# Patient Record
Sex: MALE | Race: WHITE | NOT HISPANIC OR LATINO | Employment: OTHER | RURAL
[De-identification: names, ages, dates, MRNs, and addresses within clinical notes are randomized per-mention and may not be internally consistent; named-entity substitution may affect disease eponyms.]

---

## 2017-03-23 ENCOUNTER — HISTORICAL (OUTPATIENT)
Dept: ADMINISTRATIVE | Facility: HOSPITAL | Age: 72
End: 2017-03-23

## 2017-03-24 LAB
LAB AP CLINICAL INFORMATION: NORMAL
LAB AP DIAGNOSIS - HISTORICAL: NORMAL
LAB AP GROSS PATHOLOGY - HISTORICAL: NORMAL
LAB AP SPECIMEN SUBMITTED - HISTORICAL: NORMAL

## 2017-07-26 ENCOUNTER — HISTORICAL (OUTPATIENT)
Dept: ADMINISTRATIVE | Facility: HOSPITAL | Age: 72
End: 2017-07-26

## 2017-07-27 LAB
LAB AP CLINICAL INFORMATION: NORMAL
LAB AP COMMENTS: NORMAL
LAB AP DIAGNOSIS - HISTORICAL: NORMAL
LAB AP GROSS PATHOLOGY - HISTORICAL: NORMAL
LAB AP SPECIMEN SUBMITTED - HISTORICAL: NORMAL

## 2019-01-23 ENCOUNTER — HISTORICAL (OUTPATIENT)
Dept: ADMINISTRATIVE | Facility: HOSPITAL | Age: 74
End: 2019-01-23

## 2019-10-16 ENCOUNTER — HISTORICAL (OUTPATIENT)
Dept: ADMINISTRATIVE | Facility: HOSPITAL | Age: 74
End: 2019-10-16

## 2019-11-11 ENCOUNTER — HISTORICAL (OUTPATIENT)
Dept: ADMINISTRATIVE | Facility: HOSPITAL | Age: 74
End: 2019-11-11

## 2020-08-18 ENCOUNTER — HISTORICAL (OUTPATIENT)
Dept: ADMINISTRATIVE | Facility: HOSPITAL | Age: 75
End: 2020-08-18

## 2020-08-20 LAB
INSULIN SERPL-ACNC: NORMAL U[IU]/ML
LAB AP CLINICAL INFORMATION: NORMAL
LAB AP DIAGNOSIS - HISTORICAL: NORMAL
LAB AP GROSS PATHOLOGY - HISTORICAL: NORMAL
LAB AP SPECIMEN SUBMITTED - HISTORICAL: NORMAL

## 2021-03-03 ENCOUNTER — HISTORICAL (OUTPATIENT)
Dept: ADMINISTRATIVE | Facility: HOSPITAL | Age: 76
End: 2021-03-03

## 2021-07-26 ENCOUNTER — HOSPITAL ENCOUNTER (OUTPATIENT)
Dept: RADIOLOGY | Facility: HOSPITAL | Age: 76
Discharge: HOME | End: 2021-07-26
Attending: ORTHOPAEDIC SURGERY
Payer: MEDICARE

## 2021-07-26 DIAGNOSIS — M79.642 HAND PAIN, LEFT: ICD-10-CM

## 2021-07-26 PROBLEM — M65.342 TRIGGER FINGER, LEFT RING FINGER: Status: ACTIVE | Noted: 2021-07-26

## 2021-07-26 PROCEDURE — 73130 X-RAY EXAM OF HAND: CPT | Mod: TC,LT

## 2021-08-18 PROBLEM — M65.342 TRIGGER FINGER, LEFT RING FINGER: Chronic | Status: ACTIVE | Noted: 2021-07-26

## 2021-08-19 ENCOUNTER — HOSPITAL ENCOUNTER (OUTPATIENT)
Facility: HOSPITAL | Age: 76
Discharge: HOME | End: 2021-08-19
Attending: ORTHOPAEDIC SURGERY | Admitting: ORTHOPAEDIC SURGERY
Payer: MEDICARE

## 2021-08-19 ENCOUNTER — ANESTHESIA (OUTPATIENT)
Dept: SURGERY | Facility: HOSPITAL | Age: 76
End: 2021-08-19
Payer: MEDICARE

## 2021-08-19 ENCOUNTER — ANESTHESIA EVENT (OUTPATIENT)
Dept: SURGERY | Facility: HOSPITAL | Age: 76
End: 2021-08-19
Payer: MEDICARE

## 2021-08-19 VITALS
HEART RATE: 62 BPM | DIASTOLIC BLOOD PRESSURE: 83 MMHG | SYSTOLIC BLOOD PRESSURE: 149 MMHG | TEMPERATURE: 97 F | OXYGEN SATURATION: 98 % | RESPIRATION RATE: 18 BRPM

## 2021-08-19 DIAGNOSIS — I10 HYPERTENSION: ICD-10-CM

## 2021-08-19 DIAGNOSIS — M65.342 TRIGGER FINGER, LEFT RING FINGER: Primary | ICD-10-CM

## 2021-08-19 LAB
ANION GAP SERPL CALCULATED.3IONS-SCNC: 9 MMOL/L (ref 7–16)
BUN SERPL-MCNC: 21 MG/DL (ref 7–18)
BUN/CREAT SERPL: 24 (ref 6–20)
CALCIUM SERPL-MCNC: 8.4 MG/DL (ref 8.5–10.1)
CHLORIDE SERPL-SCNC: 103 MMOL/L (ref 98–107)
CO2 SERPL-SCNC: 29 MMOL/L (ref 21–32)
CREAT SERPL-MCNC: 0.88 MG/DL (ref 0.7–1.3)
GLUCOSE SERPL-MCNC: 136 MG/DL (ref 74–106)
GLUCOSE SERPL-MCNC: 145 MG/DL (ref 70–105)
POTASSIUM SERPL-SCNC: 3.6 MMOL/L (ref 3.5–5.1)
SODIUM SERPL-SCNC: 137 MMOL/L (ref 136–145)

## 2021-08-19 PROCEDURE — 37000008 HC ANESTHESIA 1ST 15 MINUTES: Performed by: ORTHOPAEDIC SURGERY

## 2021-08-19 PROCEDURE — 36415 COLL VENOUS BLD VENIPUNCTURE: CPT | Performed by: ORTHOPAEDIC SURGERY

## 2021-08-19 PROCEDURE — 93005 ELECTROCARDIOGRAM TRACING: CPT | Mod: 59

## 2021-08-19 PROCEDURE — D9220A PRA ANESTHESIA: ICD-10-PCS | Mod: ANES,ICN,, | Performed by: ANESTHESIOLOGY

## 2021-08-19 PROCEDURE — 25000003 PHARM REV CODE 250: Performed by: NURSE ANESTHETIST, CERTIFIED REGISTERED

## 2021-08-19 PROCEDURE — 93010 ELECTROCARDIOGRAM REPORT: CPT | Mod: ,,, | Performed by: HOSPITALIST

## 2021-08-19 PROCEDURE — 71000015 HC POSTOP RECOV 1ST HR: Performed by: ORTHOPAEDIC SURGERY

## 2021-08-19 PROCEDURE — 71000033 HC RECOVERY, INTIAL HOUR: Performed by: ORTHOPAEDIC SURGERY

## 2021-08-19 PROCEDURE — D9220A PRA ANESTHESIA: ICD-10-PCS | Mod: CRNA,,, | Performed by: NURSE ANESTHETIST, CERTIFIED REGISTERED

## 2021-08-19 PROCEDURE — 36000707: Performed by: ORTHOPAEDIC SURGERY

## 2021-08-19 PROCEDURE — 27000284 HC CANNULA NASAL: Performed by: ANESTHESIOLOGY

## 2021-08-19 PROCEDURE — 80048 BASIC METABOLIC PNL TOTAL CA: CPT | Performed by: ORTHOPAEDIC SURGERY

## 2021-08-19 PROCEDURE — 37000009 HC ANESTHESIA EA ADD 15 MINS: Performed by: ORTHOPAEDIC SURGERY

## 2021-08-19 PROCEDURE — 27100168 OPTIME MED/SURG SUP & DEVICES NON-STERILE SUPPLY: Performed by: ORTHOPAEDIC SURGERY

## 2021-08-19 PROCEDURE — 82962 GLUCOSE BLOOD TEST: CPT

## 2021-08-19 PROCEDURE — 25000003 PHARM REV CODE 250: Performed by: ORTHOPAEDIC SURGERY

## 2021-08-19 PROCEDURE — 71000016 HC POSTOP RECOV ADDL HR: Performed by: ORTHOPAEDIC SURGERY

## 2021-08-19 PROCEDURE — D9220A PRA ANESTHESIA: Mod: CRNA,,, | Performed by: NURSE ANESTHETIST, CERTIFIED REGISTERED

## 2021-08-19 PROCEDURE — D9220A PRA ANESTHESIA: Mod: ANES,ICN,, | Performed by: ANESTHESIOLOGY

## 2021-08-19 PROCEDURE — 36000706: Performed by: ORTHOPAEDIC SURGERY

## 2021-08-19 PROCEDURE — 93010 EKG 12-LEAD: ICD-10-PCS | Mod: ,,, | Performed by: HOSPITALIST

## 2021-08-19 PROCEDURE — 63600175 PHARM REV CODE 636 W HCPCS: Performed by: NURSE ANESTHETIST, CERTIFIED REGISTERED

## 2021-08-19 PROCEDURE — 27201423 OPTIME MED/SURG SUP & DEVICES STERILE SUPPLY: Performed by: ORTHOPAEDIC SURGERY

## 2021-08-19 PROCEDURE — 27000716 HC OXISENSOR PROBE, ANY SIZE: Performed by: ANESTHESIOLOGY

## 2021-08-19 RX ORDER — MEPERIDINE HYDROCHLORIDE 25 MG/ML
25 INJECTION INTRAMUSCULAR; INTRAVENOUS; SUBCUTANEOUS EVERY 10 MIN PRN
Status: CANCELLED | OUTPATIENT
Start: 2021-08-19 | End: 2021-08-19

## 2021-08-19 RX ORDER — CEFAZOLIN SODIUM 1 G/3ML
INJECTION, POWDER, FOR SOLUTION INTRAMUSCULAR; INTRAVENOUS
Status: DISCONTINUED | OUTPATIENT
Start: 2021-08-19 | End: 2021-08-19

## 2021-08-19 RX ORDER — MORPHINE SULFATE 10 MG/ML
4 INJECTION INTRAMUSCULAR; INTRAVENOUS; SUBCUTANEOUS EVERY 5 MIN PRN
Status: CANCELLED | OUTPATIENT
Start: 2021-08-19

## 2021-08-19 RX ORDER — HYDROCODONE BITARTRATE AND ACETAMINOPHEN 5; 325 MG/1; MG/1
1 TABLET ORAL EVERY 4 HOURS PRN
Status: DISCONTINUED | OUTPATIENT
Start: 2021-08-19 | End: 2021-08-19 | Stop reason: HOSPADM

## 2021-08-19 RX ORDER — ONDANSETRON 2 MG/ML
4 INJECTION INTRAMUSCULAR; INTRAVENOUS DAILY PRN
Status: CANCELLED | OUTPATIENT
Start: 2021-08-19

## 2021-08-19 RX ORDER — ONDANSETRON 4 MG/1
8 TABLET, ORALLY DISINTEGRATING ORAL EVERY 8 HOURS PRN
Status: DISCONTINUED | OUTPATIENT
Start: 2021-08-19 | End: 2021-08-19 | Stop reason: HOSPADM

## 2021-08-19 RX ORDER — HYDROCODONE BITARTRATE AND ACETAMINOPHEN 10; 325 MG/1; MG/1
1 TABLET ORAL EVERY 4 HOURS PRN
Status: DISCONTINUED | OUTPATIENT
Start: 2021-08-19 | End: 2021-08-19 | Stop reason: HOSPADM

## 2021-08-19 RX ORDER — TRIAMTERENE AND HYDROCHLOROTHIAZIDE 37.5; 25 MG/1; MG/1
1 CAPSULE ORAL EVERY MORNING
COMMUNITY

## 2021-08-19 RX ORDER — LIDOCAINE HYDROCHLORIDE 5 MG/ML
INJECTION, SOLUTION INFILTRATION; INTRAVENOUS
Status: DISCONTINUED | OUTPATIENT
Start: 2021-08-19 | End: 2021-08-19

## 2021-08-19 RX ORDER — BUPIVACAINE HYDROCHLORIDE 2.5 MG/ML
INJECTION, SOLUTION EPIDURAL; INFILTRATION; INTRACAUDAL
Status: DISCONTINUED | OUTPATIENT
Start: 2021-08-19 | End: 2021-08-19 | Stop reason: HOSPADM

## 2021-08-19 RX ORDER — CEFAZOLIN SODIUM 2 G/50ML
2 SOLUTION INTRAVENOUS
Status: DISCONTINUED | OUTPATIENT
Start: 2021-08-19 | End: 2021-08-19 | Stop reason: HOSPADM

## 2021-08-19 RX ORDER — OXYCODONE HYDROCHLORIDE 5 MG/1
5 TABLET ORAL
Status: CANCELLED | OUTPATIENT
Start: 2021-08-19

## 2021-08-19 RX ORDER — ONDANSETRON 2 MG/ML
INJECTION INTRAMUSCULAR; INTRAVENOUS
Status: DISCONTINUED | OUTPATIENT
Start: 2021-08-19 | End: 2021-08-19

## 2021-08-19 RX ORDER — DIPHENHYDRAMINE HYDROCHLORIDE 50 MG/ML
25 INJECTION INTRAMUSCULAR; INTRAVENOUS EVERY 6 HOURS PRN
Status: CANCELLED | OUTPATIENT
Start: 2021-08-19

## 2021-08-19 RX ORDER — LIDOCAINE HYDROCHLORIDE 20 MG/ML
INJECTION, SOLUTION EPIDURAL; INFILTRATION; INTRACAUDAL; PERINEURAL
Status: DISCONTINUED | OUTPATIENT
Start: 2021-08-19 | End: 2021-08-19

## 2021-08-19 RX ORDER — PROMETHAZINE HYDROCHLORIDE 25 MG/1
25 TABLET ORAL EVERY 6 HOURS PRN
Status: DISCONTINUED | OUTPATIENT
Start: 2021-08-19 | End: 2021-08-19 | Stop reason: HOSPADM

## 2021-08-19 RX ORDER — TRAZODONE HYDROCHLORIDE 100 MG/1
100 TABLET ORAL NIGHTLY
COMMUNITY
End: 2022-04-18

## 2021-08-19 RX ORDER — HYDROMORPHONE HYDROCHLORIDE 2 MG/ML
0.5 INJECTION, SOLUTION INTRAMUSCULAR; INTRAVENOUS; SUBCUTANEOUS EVERY 5 MIN PRN
Status: CANCELLED | OUTPATIENT
Start: 2021-08-19

## 2021-08-19 RX ORDER — SODIUM CHLORIDE 9 MG/ML
INJECTION, SOLUTION INTRAVENOUS CONTINUOUS
Status: DISCONTINUED | OUTPATIENT
Start: 2021-08-19 | End: 2021-08-19 | Stop reason: HOSPADM

## 2021-08-19 RX ORDER — PROPOFOL 10 MG/ML
VIAL (ML) INTRAVENOUS
Status: DISCONTINUED | OUTPATIENT
Start: 2021-08-19 | End: 2021-08-19

## 2021-08-19 RX ORDER — ACETAMINOPHEN 500 MG
1000 TABLET ORAL EVERY 6 HOURS PRN
Status: DISCONTINUED | OUTPATIENT
Start: 2021-08-19 | End: 2021-08-19 | Stop reason: HOSPADM

## 2021-08-19 RX ORDER — HYDROCODONE BITARTRATE AND ACETAMINOPHEN 5; 325 MG/1; MG/1
1 TABLET ORAL EVERY 6 HOURS PRN
Qty: 28 TABLET | Refills: 0 | Status: SHIPPED | OUTPATIENT
Start: 2021-08-19 | End: 2021-08-26

## 2021-08-19 RX ORDER — FENTANYL CITRATE 50 UG/ML
INJECTION, SOLUTION INTRAMUSCULAR; INTRAVENOUS
Status: DISCONTINUED | OUTPATIENT
Start: 2021-08-19 | End: 2021-08-19

## 2021-08-19 RX ADMIN — PROPOFOL 25 MG: 10 INJECTION, EMULSION INTRAVENOUS at 03:08

## 2021-08-19 RX ADMIN — SODIUM CHLORIDE: 9 INJECTION, SOLUTION INTRAVENOUS at 03:08

## 2021-08-19 RX ADMIN — LIDOCAINE HYDROCHLORIDE 50 MG: 20 INJECTION, SOLUTION INTRAVENOUS at 03:08

## 2021-08-19 RX ADMIN — ONDANSETRON 4 MG: 2 INJECTION INTRAMUSCULAR; INTRAVENOUS at 03:08

## 2021-08-19 RX ADMIN — FENTANYL CITRATE 100 MCG: 50 INJECTION INTRAMUSCULAR; INTRAVENOUS at 03:08

## 2021-08-19 RX ADMIN — CEFAZOLIN 2 G: 1 INJECTION, POWDER, FOR SOLUTION INTRAMUSCULAR; INTRAVENOUS; PARENTERAL at 03:08

## 2021-08-19 RX ADMIN — LIDOCAINE HYDROCHLORIDE 250 MG: 5 INJECTION, SOLUTION INFILTRATION; INTRAVENOUS at 03:08

## 2021-09-01 PROBLEM — Z09 FOLLOW UP: Status: ACTIVE | Noted: 2021-09-01

## 2021-09-23 ENCOUNTER — HOSPITAL ENCOUNTER (OUTPATIENT)
Dept: RADIOLOGY | Facility: HOSPITAL | Age: 76
Discharge: HOME | End: 2021-09-23
Attending: NURSE PRACTITIONER
Payer: MEDICARE

## 2021-09-23 DIAGNOSIS — Z96.651 HISTORY OF TOTAL KNEE ARTHROPLASTY, RIGHT: ICD-10-CM

## 2021-09-23 PROCEDURE — 73562 XR KNEE 3 VIEW RIGHT: ICD-10-PCS | Mod: 26,RT,, | Performed by: RADIOLOGY

## 2021-09-23 PROCEDURE — 73562 X-RAY EXAM OF KNEE 3: CPT | Mod: 26,RT,, | Performed by: RADIOLOGY

## 2021-09-23 PROCEDURE — 73562 X-RAY EXAM OF KNEE 3: CPT | Mod: TC,RT

## 2021-10-14 PROCEDURE — 88305 TISSUE EXAM BY PATHOLOGIST: CPT | Mod: SUR | Performed by: DERMATOLOGY

## 2021-10-14 PROCEDURE — 88305 PATHOLOGY, DERMATOLOGY: ICD-10-PCS | Mod: 26,,, | Performed by: PATHOLOGY

## 2021-10-14 PROCEDURE — 88305 TISSUE EXAM BY PATHOLOGIST: CPT | Mod: 26,,, | Performed by: PATHOLOGY

## 2021-10-15 ENCOUNTER — LAB REQUISITION (OUTPATIENT)
Dept: LAB | Facility: HOSPITAL | Age: 76
End: 2021-10-15
Attending: DERMATOLOGY
Payer: MEDICARE

## 2021-10-15 DIAGNOSIS — D49.2 NEOPLASM OF UNSPECIFIED BEHAVIOR OF BONE, SOFT TISSUE, AND SKIN: ICD-10-CM

## 2021-10-18 LAB
ESTROGEN SERPL-MCNC: NORMAL PG/ML
LAB AP GROSS DESCRIPTION: NORMAL
LAB AP LABORATORY NOTES: NORMAL
LAB AP SPEC A DDX: NORMAL
LAB AP SPEC A MORPHOLOGY: NORMAL
LAB AP SPEC A PROCEDURE: NORMAL
T3RU NFR SERPL: NORMAL %

## 2021-10-28 DIAGNOSIS — M54.50 LOW BACK PAIN, UNSPECIFIED BACK PAIN LATERALITY, UNSPECIFIED CHRONICITY, UNSPECIFIED WHETHER SCIATICA PRESENT: Primary | ICD-10-CM

## 2021-11-01 ENCOUNTER — OFFICE VISIT (OUTPATIENT)
Dept: SPINE | Facility: CLINIC | Age: 76
End: 2021-11-01
Payer: MEDICARE

## 2021-11-01 ENCOUNTER — HOSPITAL ENCOUNTER (OUTPATIENT)
Dept: RADIOLOGY | Facility: HOSPITAL | Age: 76
Discharge: HOME | End: 2021-11-01
Attending: ORTHOPAEDIC SURGERY
Payer: MEDICARE

## 2021-11-01 VITALS — BODY MASS INDEX: 33.88 KG/M2 | HEIGHT: 71 IN | WEIGHT: 242 LBS

## 2021-11-01 DIAGNOSIS — M54.50 LOW BACK PAIN, UNSPECIFIED BACK PAIN LATERALITY, UNSPECIFIED CHRONICITY, UNSPECIFIED WHETHER SCIATICA PRESENT: ICD-10-CM

## 2021-11-01 DIAGNOSIS — G95.9 CERVICAL MYELOPATHY: ICD-10-CM

## 2021-11-01 DIAGNOSIS — M54.16 LUMBAR RADICULOPATHY: ICD-10-CM

## 2021-11-01 DIAGNOSIS — G95.9 CERVICAL MYELOPATHY: Primary | ICD-10-CM

## 2021-11-01 PROCEDURE — 72050 XR CERVICAL SPINE AP LAT WITH FLEX EXTEN: ICD-10-PCS | Mod: 26,,, | Performed by: ORTHOPAEDIC SURGERY

## 2021-11-01 PROCEDURE — 99204 PR OFFICE/OUTPT VISIT, NEW, LEVL IV, 45-59 MIN: ICD-10-PCS | Mod: S$PBB,,, | Performed by: ORTHOPAEDIC SURGERY

## 2021-11-01 PROCEDURE — 72110 X-RAY EXAM L-2 SPINE 4/>VWS: CPT | Mod: TC

## 2021-11-01 PROCEDURE — 72110 XR LUMBAR SPINE 5 VIEW WITH FLEX AND EXT: ICD-10-PCS | Mod: 26,,, | Performed by: ORTHOPAEDIC SURGERY

## 2021-11-01 PROCEDURE — 72050 X-RAY EXAM NECK SPINE 4/5VWS: CPT | Mod: TC

## 2021-11-01 PROCEDURE — 72050 X-RAY EXAM NECK SPINE 4/5VWS: CPT | Mod: 26,,, | Performed by: ORTHOPAEDIC SURGERY

## 2021-11-01 PROCEDURE — 72110 X-RAY EXAM L-2 SPINE 4/>VWS: CPT | Mod: 26,,, | Performed by: ORTHOPAEDIC SURGERY

## 2021-11-01 PROCEDURE — 99215 OFFICE O/P EST HI 40 MIN: CPT | Mod: PBBFAC | Performed by: ORTHOPAEDIC SURGERY

## 2021-11-01 PROCEDURE — 99204 OFFICE O/P NEW MOD 45 MIN: CPT | Mod: S$PBB,,, | Performed by: ORTHOPAEDIC SURGERY

## 2021-11-02 DIAGNOSIS — G95.9 CERVICAL MYELOPATHY: Primary | ICD-10-CM

## 2021-11-02 DIAGNOSIS — M54.16 LUMBAR RADICULOPATHY, CHRONIC: ICD-10-CM

## 2021-11-02 DIAGNOSIS — M48.02 SPINAL STENOSIS, CERVICAL REGION: ICD-10-CM

## 2021-11-09 ENCOUNTER — TELEPHONE (OUTPATIENT)
Dept: ADMINISTRATIVE | Facility: HOSPITAL | Age: 76
End: 2021-11-09
Payer: MEDICARE

## 2021-11-10 ENCOUNTER — TELEPHONE (OUTPATIENT)
Dept: SPINE | Facility: CLINIC | Age: 76
End: 2021-11-10
Payer: MEDICARE

## 2021-11-11 ENCOUNTER — TELEPHONE (OUTPATIENT)
Dept: SPINE | Facility: CLINIC | Age: 76
End: 2021-11-11
Payer: MEDICARE

## 2021-11-18 ENCOUNTER — HOSPITAL ENCOUNTER (OUTPATIENT)
Dept: RADIOLOGY | Facility: HOSPITAL | Age: 76
Discharge: HOME | End: 2021-11-18
Attending: ORTHOPAEDIC SURGERY
Payer: MEDICARE

## 2021-11-18 VITALS
WEIGHT: 248 LBS | BODY MASS INDEX: 34.72 KG/M2 | OXYGEN SATURATION: 98 % | HEART RATE: 60 BPM | DIASTOLIC BLOOD PRESSURE: 65 MMHG | HEIGHT: 71 IN | RESPIRATION RATE: 18 BRPM | SYSTOLIC BLOOD PRESSURE: 109 MMHG | TEMPERATURE: 98 F

## 2021-11-18 DIAGNOSIS — M48.02 SPINAL STENOSIS, CERVICAL REGION: ICD-10-CM

## 2021-11-18 DIAGNOSIS — M54.16 LUMBAR RADICULOPATHY, CHRONIC: ICD-10-CM

## 2021-11-18 DIAGNOSIS — G95.9 CERVICAL MYELOPATHY: ICD-10-CM

## 2021-11-18 LAB
APTT PPP: 31.4 SECONDS (ref 25.2–37.3)
BASOPHILS # BLD AUTO: 0.02 K/UL (ref 0–0.2)
BASOPHILS NFR BLD AUTO: 0.4 % (ref 0–1)
DIFFERENTIAL METHOD BLD: ABNORMAL
EOSINOPHIL # BLD AUTO: 0.08 K/UL (ref 0–0.5)
EOSINOPHIL NFR BLD AUTO: 1.6 % (ref 1–4)
ERYTHROCYTE [DISTWIDTH] IN BLOOD BY AUTOMATED COUNT: 12.5 % (ref 11.5–14.5)
GLUCOSE SERPL-MCNC: 152 MG/DL (ref 70–105)
HCT VFR BLD AUTO: 38.5 % (ref 40–54)
HGB BLD-MCNC: 13.5 G/DL (ref 13.5–18)
IMM GRANULOCYTES # BLD AUTO: 0.01 K/UL (ref 0–0.04)
IMM GRANULOCYTES NFR BLD: 0.2 % (ref 0–0.4)
INR BLD: 0.96 (ref 0.9–1.1)
LYMPHOCYTES # BLD AUTO: 1.36 K/UL (ref 1–4.8)
LYMPHOCYTES NFR BLD AUTO: 27.1 % (ref 27–41)
MCH RBC QN AUTO: 31.2 PG (ref 27–31)
MCHC RBC AUTO-ENTMCNC: 35.1 G/DL (ref 32–36)
MCV RBC AUTO: 88.9 FL (ref 80–96)
MONOCYTES # BLD AUTO: 0.44 K/UL (ref 0–0.8)
MONOCYTES NFR BLD AUTO: 8.8 % (ref 2–6)
MPC BLD CALC-MCNC: 9.5 FL (ref 9.4–12.4)
NEUTROPHILS # BLD AUTO: 3.1 K/UL (ref 1.8–7.7)
NEUTROPHILS NFR BLD AUTO: 61.9 % (ref 53–65)
NRBC # BLD AUTO: 0 X10E3/UL
NRBC, AUTO (.00): 0 %
PLATELET # BLD AUTO: 158 K/UL (ref 150–400)
PROTHROMBIN TIME: 12.8 SECONDS (ref 11.7–14.7)
RBC # BLD AUTO: 4.33 M/UL (ref 4.6–6.2)
WBC # BLD AUTO: 5.01 K/UL (ref 4.5–11)

## 2021-11-18 PROCEDURE — 72132 CT LUMBAR SPINE WITH CONTRAST: ICD-10-PCS | Mod: 26,,, | Performed by: STUDENT IN AN ORGANIZED HEALTH CARE EDUCATION/TRAINING PROGRAM

## 2021-11-18 PROCEDURE — 85610 PROTHROMBIN TIME: CPT | Performed by: STUDENT IN AN ORGANIZED HEALTH CARE EDUCATION/TRAINING PROGRAM

## 2021-11-18 PROCEDURE — 72129 CT CHEST SPINE W/DYE: CPT | Mod: 26,,, | Performed by: STUDENT IN AN ORGANIZED HEALTH CARE EDUCATION/TRAINING PROGRAM

## 2021-11-18 PROCEDURE — 72132 CT LUMBAR SPINE W/DYE: CPT | Mod: TC

## 2021-11-18 PROCEDURE — 62305 MYELOGRAPHY LUMBAR INJECTION: CPT

## 2021-11-18 PROCEDURE — 25500020 PHARM REV CODE 255: Performed by: ORTHOPAEDIC SURGERY

## 2021-11-18 PROCEDURE — 72126 CT CERVICAL SPINE WITH CONTRAST: ICD-10-PCS | Mod: 26,,, | Performed by: STUDENT IN AN ORGANIZED HEALTH CARE EDUCATION/TRAINING PROGRAM

## 2021-11-18 PROCEDURE — 72132 CT LUMBAR SPINE W/DYE: CPT | Mod: 26,,, | Performed by: STUDENT IN AN ORGANIZED HEALTH CARE EDUCATION/TRAINING PROGRAM

## 2021-11-18 PROCEDURE — 27201268 FL MYELOGRAM 2 OR MORE REGIONS

## 2021-11-18 PROCEDURE — 72126 CT NECK SPINE W/DYE: CPT | Mod: 26,,, | Performed by: STUDENT IN AN ORGANIZED HEALTH CARE EDUCATION/TRAINING PROGRAM

## 2021-11-18 PROCEDURE — 36415 COLL VENOUS BLD VENIPUNCTURE: CPT | Performed by: STUDENT IN AN ORGANIZED HEALTH CARE EDUCATION/TRAINING PROGRAM

## 2021-11-18 PROCEDURE — 82962 GLUCOSE BLOOD TEST: CPT

## 2021-11-18 PROCEDURE — 72129 CT THORACIC SPINE WITH CONTRAST: ICD-10-PCS | Mod: 26,,, | Performed by: STUDENT IN AN ORGANIZED HEALTH CARE EDUCATION/TRAINING PROGRAM

## 2021-11-18 PROCEDURE — 85730 THROMBOPLASTIN TIME PARTIAL: CPT | Performed by: STUDENT IN AN ORGANIZED HEALTH CARE EDUCATION/TRAINING PROGRAM

## 2021-11-18 PROCEDURE — 25000003 PHARM REV CODE 250: Performed by: STUDENT IN AN ORGANIZED HEALTH CARE EDUCATION/TRAINING PROGRAM

## 2021-11-18 PROCEDURE — 72126 CT NECK SPINE W/DYE: CPT | Mod: TC

## 2021-11-18 PROCEDURE — 85025 COMPLETE CBC W/AUTO DIFF WBC: CPT | Performed by: STUDENT IN AN ORGANIZED HEALTH CARE EDUCATION/TRAINING PROGRAM

## 2021-11-18 PROCEDURE — 72129 CT CHEST SPINE W/DYE: CPT | Mod: TC

## 2021-11-18 RX ORDER — IOPAMIDOL 408 MG/ML
10 INJECTION, SOLUTION INTRATHECAL
Status: DISCONTINUED | OUTPATIENT
Start: 2021-11-18 | End: 2021-11-19 | Stop reason: HOSPADM

## 2021-11-18 RX ORDER — DIAZEPAM 5 MG/1
5 TABLET ORAL ONCE
Status: COMPLETED | OUTPATIENT
Start: 2021-11-18 | End: 2021-11-18

## 2021-11-18 RX ORDER — IOPAMIDOL 408 MG/ML
15 INJECTION, SOLUTION INTRATHECAL
Status: COMPLETED | OUTPATIENT
Start: 2021-11-18 | End: 2021-11-18

## 2021-11-18 RX ADMIN — DIAZEPAM 5 MG: 5 TABLET ORAL at 08:11

## 2021-11-18 RX ADMIN — IOPAMIDOL 15 ML: 408 INJECTION, SOLUTION INTRATHECAL at 10:11

## 2021-11-22 ENCOUNTER — OFFICE VISIT (OUTPATIENT)
Dept: SPINE | Facility: CLINIC | Age: 76
End: 2021-11-22
Payer: MEDICARE

## 2021-11-22 DIAGNOSIS — M54.16 LUMBAR RADICULOPATHY, CHRONIC: ICD-10-CM

## 2021-11-22 DIAGNOSIS — M48.02 SPINAL STENOSIS, CERVICAL REGION: Primary | ICD-10-CM

## 2021-11-22 DIAGNOSIS — G95.9 CERVICAL MYELOPATHY: ICD-10-CM

## 2021-11-22 PROCEDURE — 99214 PR OFFICE/OUTPT VISIT, EST, LEVL IV, 30-39 MIN: ICD-10-PCS | Mod: S$PBB,,, | Performed by: ORTHOPAEDIC SURGERY

## 2021-11-22 PROCEDURE — 99214 OFFICE O/P EST MOD 30 MIN: CPT | Mod: PBBFAC | Performed by: ORTHOPAEDIC SURGERY

## 2021-11-22 PROCEDURE — 99214 OFFICE O/P EST MOD 30 MIN: CPT | Mod: S$PBB,,, | Performed by: ORTHOPAEDIC SURGERY

## 2021-11-22 RX ORDER — GABAPENTIN 400 MG/1
CAPSULE ORAL
COMMUNITY
End: 2022-04-18

## 2021-11-22 RX ORDER — TRAZODONE HYDROCHLORIDE 100 MG/1
TABLET ORAL
COMMUNITY
End: 2022-04-18

## 2021-11-22 RX ORDER — GLIPIZIDE 10 MG/1
TABLET ORAL
COMMUNITY
End: 2022-04-18

## 2021-11-22 RX ORDER — ALLOPURINOL 300 MG/1
TABLET ORAL
COMMUNITY
End: 2022-04-18

## 2021-11-22 RX ORDER — NAPROXEN SODIUM 220 MG/1
TABLET, FILM COATED ORAL
COMMUNITY
End: 2022-04-18

## 2021-11-22 RX ORDER — CARBIDOPA AND LEVODOPA 25; 100 MG/1; MG/1
TABLET ORAL
COMMUNITY
End: 2022-06-08

## 2021-11-22 RX ORDER — PANTOPRAZOLE SODIUM 40 MG/1
TABLET, DELAYED RELEASE ORAL
COMMUNITY
End: 2022-04-18

## 2021-11-22 RX ORDER — CARVEDILOL 25 MG/1
TABLET ORAL
COMMUNITY
End: 2022-04-18

## 2021-11-22 RX ORDER — TAMSULOSIN HYDROCHLORIDE 0.4 MG/1
CAPSULE ORAL
COMMUNITY
End: 2022-04-18

## 2021-11-22 RX ORDER — BENZTROPINE MESYLATE 1 MG/1
TABLET ORAL
COMMUNITY
End: 2022-04-18

## 2021-11-22 RX ORDER — ATORVASTATIN CALCIUM 40 MG/1
TABLET, FILM COATED ORAL
COMMUNITY
End: 2022-04-18

## 2021-12-06 PROBLEM — Z09 FOLLOW UP: Status: RESOLVED | Noted: 2021-09-01 | Resolved: 2021-12-06

## 2022-01-19 ENCOUNTER — OFFICE VISIT (OUTPATIENT)
Dept: INTERNAL MEDICINE | Facility: CLINIC | Age: 77
End: 2022-01-19
Payer: MEDICARE

## 2022-01-19 ENCOUNTER — OFFICE VISIT (OUTPATIENT)
Dept: SPINE | Facility: CLINIC | Age: 77
End: 2022-01-19
Payer: MEDICARE

## 2022-01-19 VITALS
TEMPERATURE: 97 F | BODY MASS INDEX: 32.2 KG/M2 | HEART RATE: 89 BPM | DIASTOLIC BLOOD PRESSURE: 70 MMHG | OXYGEN SATURATION: 96 % | HEIGHT: 71 IN | SYSTOLIC BLOOD PRESSURE: 110 MMHG | WEIGHT: 230 LBS

## 2022-01-19 DIAGNOSIS — E11.9 CONTROLLED TYPE 2 DIABETES MELLITUS WITHOUT COMPLICATION, WITHOUT LONG-TERM CURRENT USE OF INSULIN: ICD-10-CM

## 2022-01-19 DIAGNOSIS — G95.9 CERVICAL MYELOPATHY: ICD-10-CM

## 2022-01-19 DIAGNOSIS — M48.02 SPINAL STENOSIS, CERVICAL REGION: ICD-10-CM

## 2022-01-19 DIAGNOSIS — I10 ESSENTIAL HYPERTENSION: Primary | ICD-10-CM

## 2022-01-19 DIAGNOSIS — M15.9 OSTEOARTHRITIS OF MULTIPLE JOINTS, UNSPECIFIED OSTEOARTHRITIS TYPE: ICD-10-CM

## 2022-01-19 DIAGNOSIS — E78.5 HYPERLIPIDEMIA LDL GOAL <100: ICD-10-CM

## 2022-01-19 DIAGNOSIS — M54.16 LUMBAR RADICULOPATHY, CHRONIC: Primary | ICD-10-CM

## 2022-01-19 PROCEDURE — 99214 OFFICE O/P EST MOD 30 MIN: CPT | Mod: S$PBB,,, | Performed by: ORTHOPAEDIC SURGERY

## 2022-01-19 PROCEDURE — 99214 OFFICE O/P EST MOD 30 MIN: CPT | Mod: PBBFAC,27 | Performed by: ORTHOPAEDIC SURGERY

## 2022-01-19 PROCEDURE — 99214 OFFICE O/P EST MOD 30 MIN: CPT | Mod: S$PBB,,, | Performed by: INTERNAL MEDICINE

## 2022-01-19 PROCEDURE — 99215 OFFICE O/P EST HI 40 MIN: CPT | Mod: PBBFAC | Performed by: INTERNAL MEDICINE

## 2022-01-19 PROCEDURE — 99214 PR OFFICE/OUTPT VISIT, EST, LEVL IV, 30-39 MIN: ICD-10-PCS | Mod: S$PBB,,, | Performed by: INTERNAL MEDICINE

## 2022-01-19 PROCEDURE — 99214 PR OFFICE/OUTPT VISIT, EST, LEVL IV, 30-39 MIN: ICD-10-PCS | Mod: S$PBB,,, | Performed by: ORTHOPAEDIC SURGERY

## 2022-01-19 NOTE — PROGRESS NOTES
Subjective:       Patient ID: Usama Lee is a 76 y.o. male.    Chief Complaint: Follow-up    Seeing dr martinez for neck and low back issues may have surg  Now for pt/ot  Does not check bs very often  Was 135 last time  Eats on diet    Follow-up  Pertinent negatives include no arthralgias, change in bowel habit, chest pain, fatigue or rash.     Review of Systems   Constitutional: Negative for appetite change, fatigue and unexpected weight change.   HENT: Negative for postnasal drip, trouble swallowing and goiter.    Eyes: Negative for visual disturbance.   Respiratory: Negative for apnea, choking and chest tightness.    Cardiovascular: Negative for chest pain and leg swelling.   Gastrointestinal: Negative for blood in stool, change in bowel habit and change in bowel habit.   Genitourinary: Negative for difficulty urinating and frequency.   Musculoskeletal: Negative for arthralgias, back pain and leg pain.   Integumentary:  Negative for rash.   Neurological: Negative for disturbances in coordination, memory loss and coordination difficulties.   Hematological: Negative for adenopathy.   Psychiatric/Behavioral: Negative for agitation. The patient is not hyperactive.          Objective:      Physical Exam  Vitals and nursing note reviewed.   Constitutional:       Appearance: Normal appearance. He is obese.   HENT:      Head: Normocephalic and atraumatic.      Right Ear: Tympanic membrane, ear canal and external ear normal.      Left Ear: Tympanic membrane, ear canal and external ear normal.      Nose: Nose normal.      Mouth/Throat:      Mouth: Mucous membranes are moist.      Pharynx: Oropharynx is clear.   Eyes:      Extraocular Movements: Extraocular movements intact.      Conjunctiva/sclera: Conjunctivae normal.      Pupils: Pupils are equal, round, and reactive to light.   Cardiovascular:      Rate and Rhythm: Normal rate and regular rhythm.      Pulses: Normal pulses.      Heart sounds: Normal heart sounds.    Pulmonary:      Effort: Pulmonary effort is normal.      Breath sounds: Normal breath sounds.   Abdominal:      General: Abdomen is flat. Bowel sounds are normal.      Palpations: Abdomen is soft.   Musculoskeletal:         General: Normal range of motion.      Cervical back: Normal range of motion.   Skin:     General: Skin is warm and dry.      Capillary Refill: Capillary refill takes less than 2 seconds.   Neurological:      General: No focal deficit present.      Mental Status: He is alert and oriented to person, place, and time.   Psychiatric:         Mood and Affect: Mood normal.         Behavior: Behavior normal.         Thought Content: Thought content normal.         Judgment: Judgment normal.         Assessment:       No diagnosis found.    Plan:         Patient Instructions   Continue current meds and f/u 3 months        Problem List Items Addressed This Visit    None

## 2022-01-19 NOTE — PROGRESS NOTES
MDM/time:  Greater than 30 minutes spent on this encounter including 10 minutes reviewing imaging and notes, 15 minutes with the patient, 5 minutes documentation    ASSESSMENT:  76 y.o. male with cervical spondylolisthesis with myelopathy and lumbar spondylosis with radiculopathy and moderate bilateral hip arthritis    PLAN:  Patient wants to try physical therapy.  Follow-up in 2 months.  Consider surgery for cervical and lumbar stenosis at that time if not improving    HPI:  76 y.o. male here for repeat evaluation of lower back pain that radiates down bilateral legs.  Denies any new injuries.  Reports bilateral lower extremities are worse.  It is also complaining of worsening balance and hand dysfunction.  He had 2 injections with Dr. Swain at [facility] without relief.  Patient reports history of peripheral neuropathy and relates his balance issues to this.  Patient reports decreased  strength in bilateral hands.  Difficulty with balance has had multiple falls walks with a cane.  Denies bladder incontinence difficulty walking more than 100 yd due to shortness of breath weakness in his legs.  Currently taking Neurontin 400 mg 2 tablets twice a day and Aleve as needed for pain.  No recent physical therapy.  History of L4-5 fusion in . Patient does not smoke.  Patient has a pacemaker Dr. Moody is his cardiologist and is on Eliquis.    IMAGIN2021 cervical spine x-ray reviewed showed:  On the AP there is normal coronal alignment.  There is uncovertebral and facet hypertrophy seen.  On the lateral there is decreased cervical lordosis.  There are spondylotic changes noted with decrease in disc height and osteophyte formation seen.  There is retrolisthesis at C3-4.    CT myelogram cervical spine 2021 reviewed shows:  At C3-4 there is moderate central severe bilateral foraminal stenosis  At C4-5 there is moderate central severe bilateral foraminal stenosis  At C5-6 there is moderate  central stenosis  At C6-7 there is severe central and bilateral foraminal stenosis    11/1/2021 lumbar spine x-ray reviewed showed:  On the AP there is normal coronal alignment.  There are 5 non-rib-bearing lumbar vertebrae.  On the lateral there is decreased lumbar lordosis.  There is spondylotic disease with decreased disc height and osteophyte formation noted.  There is apparent fusion at L4-5.  No fractures or listhesis noted.  No instability on flexion-extension views.  Moderate bilateral hip arthritis.    CT myelogram 11/18/2021 lumbar spine reviewed shows:  At L1-2 there is moderate bilateral lateral recess stenosis and left greater than right foraminal stenosis  At L2-3 there is moderate central and severe bilateral lateral recess stenosis  At L3-4 there is severe central bilateral lateral recess stenosis and severe bilateral foraminal stenosis  At L4-5 there is prior fusion with severe central bilateral lateral recess stenosis and severe bilateral foraminal stenosis  At L5-S1 there is severe bilateral foraminal stenosis  Past Medical History:   Diagnosis Date    Diabetes     Gout     Heart disease     High cholesterol     HTN (hypertension)     Parkinson disease      Past Surgical History:   Procedure Laterality Date    FLEXOR TENDON REPAIR Left 8/19/2021    Procedure: REPAIR, TENDON, FLEXOR;  Surgeon: Daniel Dempsey MD;  Location: [facility];  Service: Orthopedics;  Laterality: Left;    TOTAL KNEE ARTHROPLASTY Right     TRIGGER FINGER RELEASE Left 8/19/2021    Procedure: RELEASE, TRIGGER FINGER,RING;  Surgeon: Daniel Dempsey MD;  Location: [facility] OR;  Service: Orthopedics;  Laterality: Left;     Social History     Tobacco Use    Smoking status: Never Smoker    Smokeless tobacco: Never Used   Substance Use Topics    Alcohol use: Not Currently      Current Outpatient Medications   Medication Instructions    allopurinoL (ZYLOPRIM) 300 MG tablet TAKE 1 TABLET(S) BY MOUTH  DAILY    allopurinoL (ZYLOPRIM) 300 MG tablet 1 tablet    apixaban (ELIQUIS) 2.5 mg Tab as directed    apixaban (ELIQUIS) 5 mg Tab 1 tablet, Oral, Every 12 hours    aspirin (ECOTRIN) 81 MG EC tablet 1 tablet, Oral, Daily    aspirin 81 MG Chew 1 tablet    atorvastatin (LIPITOR) 40 MG tablet 1 tablet    atorvastatin (LIPITOR) 80 MG tablet TAKE ONE-HALF TABLET BY MOUTH DAILY FOR CHOLESTEROL. STOP MEDICATION AND CALL PROVIDER FOR ANY UNEXPLAINED MUSCLE ACHES,PAIN OR WEAKNESS    benztropine (COGENTIN) 1 MG tablet 1 tablet, Oral, Nightly    benztropine (COGENTIN) 1 MG tablet 1 tablet at bedtime    carbidopa-levodopa  mg (SINEMET)  mg per tablet Oral    carbidopa-levodopa  mg (SINEMET)  mg per tablet 1 tablet    carvediloL (COREG) 25 MG tablet TAKE ONE TABLET BY MOUTH 2 TIMES A DAY FOR HEART    carvediloL (COREG) 25 MG tablet 1/2    doxycycline (MONODOX) 100 MG capsule TAKE ONE CAPSULE BY MOUTH DAILY WITH FOOD    ferrous sulfate (FEOSOL) 325 mg (65 mg iron) Tab tablet Oral    fluticasone propionate (FLONASE) 50 mcg/actuation nasal spray INSTILL 1 SPRAY IN NOSTRIL(S) DAILY    gabapentin (NEURONTIN) 400 MG capsule TAKE 1 CAPSULE BY MOUTH FOUR TIMES A DAY FOR NERVE PAIN    gabapentin (NEURONTIN) 400 MG capsule 1 capsule    glipiZIDE (GLUCOTROL) 10 MG tablet 1 tablet    glipiZIDE (GLUCOTROL) 5 MG tablet TAKE ONE TABLET BY MOUTH 2 TIMES A DAY FOR DIABETES    isosorbide mononitrate (IMDUR) 30 MG 24 hr tablet 1 tablet, Oral, Daily    nicotine polacrilex 2 MG Lozg DISSOLVE 1 LOZENGE IN MOUTH EVERY 2 HOURS AS NEEDED FOR SMOKING CESSATION    nitroGLYCERIN (NITROSTAT) 0.4 MG SL tablet DISSOLVE ONE TABLET UNDER THE TONGUE EVERY 5 MINUTES AS NEEDED    pantoprazole (PROTONIX) 40 MG tablet TAKE ONE TABLET BY MOUTH DAILY FOR STOMACH    pantoprazole (PROTONIX) 40 MG tablet 1 tablet    tadalafiL (CIALIS) 20 MG Tab TAKE 1/2 TABLET BY MOUTH ONE hour BEFORE sex    tamsulosin (FLOMAX) 0.4 mg  Cap TAKE 1 CAPSULE BY MOUTH EVERY EVENING FOR URINATION. TAKE 30 MINUTES AFTER EVENING MEAL.    tamsulosin (FLOMAX) 0.4 mg Cap 1 capsule 30 minutes after the same meal each day    traZODone (DESYREL) 100 MG tablet 1 tablet at bedtime    traZODone (DESYREL) 100 mg, Oral, Nightly    triamterene-hydrochlorothiazide 37.5-25 mg (DYAZIDE) 37.5-25 mg per capsule 1 capsule, Oral, Every morning    triamterene-hydrochlorothiazide 37.5-25 mg (MAXZIDE-25) 37.5-25 mg per tablet TAKE 1 TABLET BY MOUTH DAILY AS NEEDED FOR FLUID        EXAM:  Constitutional  General Appearance:  There is no height or weight on file to calculate BMI., NAD  Psychiatric   Orientation: Oriented to time, oriented to place, oriented to person  Mood and Affect: Active and alert, normal mood, normal affect  Gait and Station   Appearance:  Antalgic gait/wide-based walks with a cane, unable to tandem gait, unable to walk on toes, unable to walk on heels    CERVICAL  Musculoskeletal System  Shoulder:  normal appearance, no instability, no tenderness, normal ROM right, normal ROM left, no pain with ROM    Cervical Spine  Inspection:  alignment normal, no muscle atrophy  Soft Tissue Palpation on the Right:  no tenderness of the paracervicals, no tenderness of the trapezius, no tenderness of the rhomboid  Soft Tissue Palpation on the Left:  no tenderness of the paracervicals, no tenderness of the trapezius, no tenderness of the rhomboid  Bony Palpation:  no tenderness of the occipital protuberance  Active Range:     Flexion normal, Extension normal, Rotation to the left normal, Rotation to the right normal, Lateral flexion to the left normal, Lateral flexion to the right normal   No pain elicited on motion    Motor Strength  C5 on the right:  abduction deltoid 5/5  C5 on the left:  abduction deltoid 5/5  C6 on the Right:  flexion biceps 5/5, wrist extension 5/5  C6 on the Left:  flexion biceps 5/5, wrist extension 5/5  C7 on the Right:  extension fingers  5/5, extension triceps 5/5  C7 on the Left:  extension fingers 5/5, extension triceps 5/5  C8 on the Right:  flexion fingers 5/5  C8 on the Left:  flexion fingers 5/5  T1 on the Right:  abduction fingers 5/5  T1 on the Left:  abduction fingers 5/5    Neurological System  Sensation on the Right:  normal sensation of the extremities: right, normal median nerve distribution, normal ulnar nerve distribution  Sensation on the Left:  normal sensation of the extremities: left, normal median nerve distribution, normal ulnar nerve distribution  Biceps Reflex Right:  normal, Brachioradialis Reflex Right:  normal, Triceps Reflex Right: normal  Biceps Reflex Left:  normal, Brachioradialis Reflex Left: normal, Triceps Reflex Left:  normal  Special Test on the Right:  Spurlings test negative, Hoffmans reflex absent, no ankle clonus, Durkan test negative, Tinels sign negative at the elbow  Special Test on the Left:  Spurlings test negative, Hoffmans reflex absent, no ankle clonus, Durkan test negative, Tinels sign negative at the elbow    Skin:   Head and Neck:  normal   Right Upper Extremity:  normal   Left Upper Extremity:  normal    Cardiovascular:   Arterial Pulses Right:  radial right   Arterial Pulses Left:  radial left   Edema Right:  none   Edema Left:  None    LUMBAR  Musculoskeletal System   Hips: Normal appearance, no leg length discrepancy, normal motion; left, normal motion; right    Lumbar Spine                   Inspection:  Normal alignment, normal sagittal balance                  Range of motion:  Decreased flexion, extension, lateral bending, rotation. Pain with range of motion                  Bony Palpation of the Lumbar Spine:  No tenderness of the spinous process, no tenderness of the sacrum, no tenderness of the coccyx                  Bony Palpation of the Right Hip:  No tenderness of the iliac crest, no tenderness of the sciatic notch, no tenderness of the SI joint                  Bony Palpation of the  Left Hip:  No tenderness of the iliac crest, no tenderness of the sciatic notch, no tenderness of the SI joint                  Soft Tissue Palpation on the Right:  No tenderness of the paraspinal region, no tenderness of the iliolumbar region                  Soft Tissue Palpation on the Left:  No tenderness of the paraspinal region, no tenderness of the iliolumbar region    Motor Strength   L1 Right:  Hip flexion iliopsoas 5/5    L1 Left:  Hip flexion iliopsoas 5/5              L2-L4 Right:  Knee extension quadriceps 5/5, tibialis anterior 5/5              L2-L4 Left:  Knee extension quadriceps 5/5, tibialis anterior 5/5   L5 Right:  Extensor hallucis llongus 5/5,    L5 Left:  Extensor hallucis longus 5/5,    S1 Right:  Plantar flexion gastrocnemius 5/5   S1 Left:  Plantar flexion gastrocnemius 5/5    Neurological System   Ankle Reflex Right:  normal   Ankle Reflex Left: normal   Knee Reflex Right:  normal   Knee Reflex Left:  normal   Sensation on the Right:  L2 normal, L3 normal, L4 normal, L5 normal, S1 normal   Sensation on the Left:  L2 normal, L3 normal, L4 normal, L5 normal, S1 normal              Special Test on the Right:  Seated straight leg raising test negative, no clonus of the ankle              Special Test on the Left:  Seated straight leg raising test negative, no clonus of the ankle    Skin   Lumbosacral Spine:  Normal skin    Cardiovascular System   Arterial Pulses Right:  Posterior tibialis normal, dorsalis pedis normal   Arterial Pulses Left:  Posterior tibialis normal, dorsalis pedis normal   Edema Right: None   Edema Left:  None         admit wt used: 53Kg

## 2022-01-27 ENCOUNTER — CLINICAL SUPPORT (OUTPATIENT)
Dept: REHABILITATION | Facility: HOSPITAL | Age: 77
End: 2022-01-27
Payer: MEDICARE

## 2022-01-27 DIAGNOSIS — M54.16 LUMBAR RADICULOPATHY, CHRONIC: ICD-10-CM

## 2022-01-27 PROCEDURE — 97162 PT EVAL MOD COMPLEX 30 MIN: CPT

## 2022-01-27 NOTE — PLAN OF CARE
[facility]   Physical Therapy Initial Evaluation    Name: Usama Lee  Clinic Number: [id number]    Therapy Diagnosis:   Encounter Diagnosis   Name Primary?    Lumbar radiculopathy, chronic      Physician: Charbel Blake MD    Physician Orders: PT Eval and Treat   Medical Diagnosis from Referral: M54.16 Lumbar radiculopathy, chronic  Evaluation Date: 1/27/2022  Authorization Period Expiration: As medically necessary  Plan of Care Expiration: 3/11/2022  Visit # / Visits authorized: 1/ 12    Time In: 1:25 PM  Time Out: 2:05 PM  Total Appointment Time (timed & untimed codes): 40 minutes    Precautions: Standard    Subjective   Date of onset: Chronic  History of current condition - Magen reports: very mild pain in his lower back but reports more issues with balance and recurrent falls. He ambulates with use of rollator and reports a fall right before coming to his appointment today. He has significant spinal stenosis and posterolateral disc bulge at multiple segments of lumbar and cervical spine. He does not report taking any prescription pain medications and reports managing the pain from time to time with a moist hot pack. He also has significant peripheral neuropathy that affects his balance as well.      Medical History:   Past Medical History:   Diagnosis Date    Diabetes     Gout     Heart disease     High cholesterol     HTN (hypertension)     Parkinson disease        Surgical History:   Usama Lee  has a past surgical history that includes Total knee arthroplasty (Right); Trigger finger release (Left, 8/19/2021); and Flexor tendon repair (Left, 8/19/2021).    Medications:   Usama has a current medication list which includes the following prescription(s): allopurinol, allopurinol, apixaban, apixaban, aspirin, aspirin, atorvastatin, atorvastatin, benztropine, benztropine, carbidopa-levodopa  mg, carbidopa-levodopa  mg, carvedilol, carvedilol, doxycycline, ferrous  sulfate, fluticasone propionate, gabapentin, gabapentin, glipizide, glipizide, isosorbide mononitrate, nicotine polacrilex, nitroglycerin, pantoprazole, pantoprazole, tadalafil, tamsulosin, tamsulosin, trazodone, trazodone, triamterene-hydrochlorothiazide 37.5-25 mg, and triamterene-hydrochlorothiazide 37.5-25 mg.    Allergies:   Review of patient's allergies indicates:   Allergen Reactions    Lovastatin      Other reaction(s): Muscle pain        Imaging, CT scan films: FINDINGS:  Cervical spine:  Mild bilateral neural foramen narrowing due to uncovertebral and facet change at C2-C3.  Moderate to severe spinal canal narrowing and bilateral neural foraminal narrowing due to posterior disc complex, uncovertebral joint, and facet change at the C3-C4 level.   Severe spinal canal and severe bilateral neural foraminal narrowing due to posterior disc complex, uncovertebral joint, and facet change at C4-C5.  Moderate spinal canal and moderate bilateral neural foramen narrowing due to posterior disc complex, uncovertebral joint and facet change at C5-C6  Moderate spinal canal and severe bilateral neural foramen narrowing due to posterior disc complex, uncovertebral joint and facet change at C6-C7  Moderate spinal canal and mild bilateral neural foramen narrowing due to posterior disc complex, uncovertebral joint and facet change at C7-T1     Thoracic spine:  Mild left anterior spinal canal narrowing due to a disc protrusion at the T2-T3 level, series 3, image 37.   Posterior disc change, facet disease, and left facet osteophyte results in at least moderate spinal canal narrowing and moderate left-sided neural foramen narrowing.  This is at the T11-T12 level.  Otherwise the spinal canal and neural foramina are patent.  The pulmonary arteries are enlarged.  Scattered ground-glass attenuation.  Enlarged heart.    Lumbar spine level by level:   Posterior disc complex, ligamentum flavum thickening and facet change results in  moderate spinal canal narrowing and severe bilateral neural foramen narrowing at L1-L2  There is a central posterior disc protrusion resulting near total narrowing of the spinal canal without passage of contrast.  This is at the L2-L3 level, series 4, image 42.  Posterior disc change and facet arthropathy results in mild bony spinal canal and severe neural foramen narrowing at the L3-L4 level.  There is severe bilateral neural foramen narrowing at L4-5 and L5-S1 due to posterior disc change and facet disease at the L4-5 level.  There is also severe spinal canal narrowing due to these degenerative findings at the L4-5 level.  Severe calcified vascular disease    Prior Therapy: None in the past 6 months  Social History:  lives alone but has a  who comes by about 3 times week to assist as needed.  Occupation: Retired  Prior Level of Function: Independent    Pain:  Current 4/10, worst 8/10, best 2/10   Location: cervical spine/ lumbar spine and bilateral hips    Description: Aching  Aggravating Factors: Standing  Easing Factors: heating pad    Pts goals: improve balance    Objective     Posture:  1. Standing lordosis: Decreased  2. Sitting lordosis: Decreased  3. Iliac crest height:  equal  4. PSIS height:  equal  5. Pelvic rotation/torsion: no  6. Scoliosis: no  7. Lateral shift: No  8. Comments:    Forward bend:  Back bend:   Lateral trunk lean: right 35 left 35  Trunk rotation: right 48 left 48    MANUAL MUSCLE TEST  Right left   Hip flexion MMT number: 3+/5 MMT strength: 3+/5   Hip abduction MMT strength: 3/5 MMT strength: 3/5   Knee extension MMT strength: 3+/5 MMT strength: 3+/5   Knee flexion  MMT strength: 4/5 MMT strength: 3+/5   Ankle dorsiflexion MMT strength: 3+/5 MMT strength: 3+/5   Ankle plantar flexion  MMT strength: 3+/5 MMT strength: 3+/5   Extensor hallucis longus MMT strength: 3+/5 MMT strength: 3+/5     ROM/flexibility right left   Hip flexion (120) 105 105   Internal rotation (45) 18 18    External rotation (45) 40 40   Hamstring 90/90 (-10) -8 -8   Rectus femoris (120) 100 100   Other     Other       Special test Right  Left    SLR test < 60 degrees Positive Positive   SLR test > 60 degrees Positive Positive   Sitting slump test Positive Positive   Piriformis test Negative Negative   ANIVAL test Negative Negative   SI forward bend Negative Negative   SI distraction Negative Negative   SI compression Negative Negative     Gait assessment:   Step length: decreased right  Step width: decreased right  Sun: decreased  Antalgic gait: yes/ reduced LE clearance on BLE, increased anterior WS during ambulation, narrow NILTON, shortened step length  Assistive device: rolling walker  Tinetti: 13/28  DGI: 10/24  30 second sit<>stand: 5  Cervical Spine  Sensation:   RUE: Intact   LUE: Intact    Special Tests   Compression: Positive   Distraction: Positive   Spurling's: Positive     Right  AROM (degs)  Left   50 Cervical Flexion   50   35 Cervical Extension   35   20 Lateral bending  Normal 45 20   48 Rotation  Normal 80 48   155 Shoulder flexion 155   150  Shoulder abduction 150   WFL Internal rotation WFL   WFL External rotation WFL       Limitation/Restriction for FOTO Spine Survey    Therapist reviewed FOTO scores for Usama Lee on 1/27/2022.   FOTO documents entered into [employer] - see Media section.    Limitation Score: 32%         Assessment   Usama is a 76 y.o. male referred to outpatient Physical Therapy with a medical diagnosis of lumbar spine pain. Pt presents with pain in lower back, difficulty with ambulation and dynamic balance, difficulty with transfers and negotiation of steps, reduced ROM for both cervical and lumbar spine. Treatment will address patient's pain in lumbar and cervical spine but will also focus on LE strengthening and dynamic balance to increase stability with all forms of functional mobility    Pt prognosis is Excellent.   Pt will benefit from skilled outpatient Physical  Therapy to address the deficits stated above and in the chart below, provide pt/family education, and to maximize pt's level of independence.     Plan of care discussed with patient: Yes  Pt's spiritual, cultural and educational needs considered and patient is agreeable to the plan of care and goals as stated below:     Anticipated Barriers for therapy: None    Goals:  1. Patient will increase pain free ROM of cervical spine by 15% to reduce pain with checking blind spots while driving and looking up as if to reach for something overhead  2. Patient will increase Tinetti score to 23/28 to reduce overall fall risks.  3. Patient will increase DGI score to 18/24  4. Patient will demonstrate 9 stands in 30 seconds unassisted from standard height chair demonstrating improved LE strength and endurance    Plan   Plan of care Certification: 1/27/2022 to 3/11/2022.    Outpatient Physical Therapy 2 times weekly for 6 weeks to include the following interventions: Manual Therapy, Moist Heat/ Ice, Neuromuscular Re-ed, Patient Education, Therapeutic Activities and Therapeutic Exercise.     Alber Fowler, PT

## 2022-02-04 ENCOUNTER — CLINICAL SUPPORT (OUTPATIENT)
Dept: REHABILITATION | Facility: HOSPITAL | Age: 77
End: 2022-02-04
Payer: MEDICARE

## 2022-02-04 DIAGNOSIS — M54.16 LUMBAR RADICULOPATHY, CHRONIC: Primary | ICD-10-CM

## 2022-02-04 PROCEDURE — 97530 THERAPEUTIC ACTIVITIES: CPT | Mod: CQ

## 2022-02-04 PROCEDURE — 97010 HOT OR COLD PACKS THERAPY: CPT | Mod: CQ

## 2022-02-04 PROCEDURE — 97110 THERAPEUTIC EXERCISES: CPT | Mod: CQ

## 2022-02-04 NOTE — PROGRESS NOTES
"  Physical Therapy Treatment Note     Name: Usama Lee  Clinic Number: [id number]    Therapy Diagnosis: No diagnosis found.  Physician: Charbel Blake MD    Visit Date: 2/4/2022    Physician Orders: PT Eval and Treat  Medical Diagnosis from Referral: M54.16 Lumbar radiculopathy, chronic  Evaluation Date: 1/27/2022  Authorization Period Expiration: As medically necessary   Plan of Care Certification Period: 1/27/2022 to 3/11/2022  Updated Plan of Care Due: 3/11/2022  Visit # / Visits authorized: 2/12   PTA Visit #: 1    Time In: 9:32 AM  Time Out: 10:28 AM  Total Billable Time: 56 minutes    Precautions: Standard  Functional Level Prior to Evaluation: independent     Subjective     Pt reports: No complaints of pain upon arrival; Patient seeming frustrated stating, "I just don't have any balance or strength."  He will be provided with HEP this visit.   Response to previous treatment: N/A  Functional change: no change noted    Pain: 0/10  Location: N/A    Objective     Magen received therapeutic exercises to develop strength, endurance and flexibility for 46 minutes including:  Nu step x6 min level 2  Seated hamstring stretch 3x15 sec  Quad stretch-  SLR x20  Supine CLAMS BTB x30  Seated heel raises 20x3 sec  LAQ 7.5#  Seated march x20 7.5#  Hamstring curl GTB x30   Mini squats x20  Step ups 4" in // bars x10 ea  Hip extension in // bars x15 ea  Standing march x20 ea  SKTC 3x15 sec  Lumbar rotations-  HEP education    Magen participated in neuromuscular re-education activities to improve: Balance and Coordination for - minutes. The following activities were included:  Alternating toe taps 4" step-    Magen participated in dynamic functional therapeutic activities to improve functional performance for 10  minutes, including:  Stair negotiation-  Sit>stands from lowered level mat surface x10 (Min A-Mod A)  Transfer training with rollator:    Chair>mat x3   Mat>chair x3    Magen received hot pack for 15 minutes " to lumbar while performing supine exercises.     Home Exercises Provided and Patient Education Provided     Education provided: On proper execution of exercises requiring verbal and tactile cues; HEP education on handout provided    Written Home Exercises Provided: yes.  Exercises were reviewed and Magen was able to demonstrate them prior to the end of the session.  Magen demonstrated good  understanding of the education provided.     See EMR under Media for exercises provided 2/4/2022.    Assessment     Patient tolerated treatment well demonstrating quick onset of fatigue requiring rest breaks. PTA provided skilled cues to ensure proper execution, decrease muscle substitutions, and for overall safety. Reports he had no increased pain post therapy session but was fatigued.     Magen 's progression to be determined at later date.    Pt prognosis is Excellent.     Pt will continue to benefit from skilled outpatient physical therapy to address the deficits listed in the problem list box on initial evaluation, provide pt/family education and to maximize pt's level of independence in the home and community environment.     Pt's spiritual, cultural and educational needs considered and pt agreeable to plan of care and goals.     Anticipated barriers to physical therapy: none    Goals:  1. Patient will increase pain free ROM of cervical spine by 15% to reduce pain with checking blind spots while driving and looking up as if to reach for something overhead  2. Patient will increase Tinetti score to 23/28 to reduce overall fall risks.  3. Patient will increase DGI score to 18/24  4. Patient will demonstrate 9 stands in 30 seconds unassisted from standard height chair demonstrating improved LE strength and endurance    Plan     Continue with plan of care as indicated.     Outpatient Physical Therapy 2 times weekly for 6 weeks to include the following interventions: Manual Therapy, Moist Heat/ Ice, Neuromuscular Re-ed, Patient  Education, Therapeutic Activities and Therapeutic Exercise.     Brittney Glaser, PTA  2/4/2022

## 2022-02-08 ENCOUNTER — CLINICAL SUPPORT (OUTPATIENT)
Dept: REHABILITATION | Facility: HOSPITAL | Age: 77
End: 2022-02-08
Payer: MEDICARE

## 2022-02-08 DIAGNOSIS — M54.16 LUMBAR RADICULOPATHY, CHRONIC: Primary | ICD-10-CM

## 2022-02-08 PROCEDURE — 97530 THERAPEUTIC ACTIVITIES: CPT

## 2022-02-08 PROCEDURE — 97110 THERAPEUTIC EXERCISES: CPT

## 2022-02-08 NOTE — PROGRESS NOTES
"  Physical Therapy Treatment Note     Name: Usama Lee  Clinic Number: [id number]    Therapy Diagnosis: No diagnosis found.  Physician: Charbel Blake MD    Visit Date: 2/8/2022    Physician Orders: PT Eval and Treat  Medical Diagnosis from Referral: M54.16 Lumbar radiculopathy, chronic  Evaluation Date: 1/27/2022  Authorization Period Expiration: As medically necessary   Plan of Care Certification Period: 1/27/2022 to 3/11/2022  Updated Plan of Care Due: 3/11/2022  Visit # / Visits authorized: 3/12   PTA Visit #: 0    Time In: 9:24 AM  Time Out: 10:10 AM  Total Billable Time: 46 minutes    Precautions: Standard  Functional Level Prior to Evaluation: independent     Subjective     Pt reports: patient stated he was very sore and in a great deal of pain in his low back upon arrival.  He will be provided with HEP this visit.   Response to previous treatment: N/A  Functional change: no change noted    Pain: 6/10  Location: N/A    Objective     Magen received therapeutic exercises to develop strength, endurance and flexibility for 36 minutes including:  Nu step 6mins Lv 2  Seated hamstring stretch 3x15 sec  Quad stretch-  SLR x15  Supine clamshells BTB x30  Seated alternating ankle DF<>PF x20  Seated heel raises 10x3 sec  B LAQ 7.5# x10 ea  Seated march x15 7.5#  B Hamstring curl GTB x15 ea  Mini squats x15  Step ups 4" in // bars x10 ea  Hip extension in // bars x10 ea  Standing march x20 ea  SKTC 5x10 sec  Lumbar rotations 10x3 sec hold   HEP education    Magen participated in neuromuscular re-education activities to improve: Balance and Coordination for - minutes. The following activities were included:  Alternating toe taps 4" step-    Magen participated in dynamic functional therapeutic activities to improve functional performance and stability for 10  minutes, including:  Stair negotiation-  Sit>stands from lowered level mat surface x5 (Min A-Mod A)  Transfer training with rollator:    Chair>mat " x2   Mat>chair x2    Magen received hot pack for 15 minutes to lumbar while performing supine exercises.     Home Exercises Provided and Patient Education Provided     Education provided: On proper execution of exercises requiring verbal and tactile cues; HEP education on handout provided    Written Home Exercises Provided: yes.  Exercises were reviewed and Magen was able to demonstrate them prior to the end of the session.  Magen demonstrated good  understanding of the education provided.     See EMR under Media for exercises provided 2/4/2022.    Assessment     Patient tolerated treatment well and demonstrated quick fatigue and low endurance upon participating in standing activities. Patient required increased time and frequent rest breaks between exercises. PT provided education to ensure proper execution, decrease muscle substitutions, and for overall safety. Reports the pain radiating down his leg had gone away post therapy session but stated he was very fatigued.     Magen 's progression to be determined at later date.    Pt prognosis is Excellent.     Pt will continue to benefit from skilled outpatient physical therapy to address the deficits listed in the problem list box on initial evaluation, provide pt/family education and to maximize pt's level of independence in the home and community environment.     Pt's spiritual, cultural and educational needs considered and pt agreeable to plan of care and goals.     Anticipated barriers to physical therapy: none    Goals:  1. Patient will increase pain free ROM of cervical spine by 15% to reduce pain with checking blind spots while driving and looking up as if to reach for something overhead  2. Patient will increase Tinetti score to 23/28 to reduce overall fall risks.  3. Patient will increase DGI score to 18/24  4. Patient will demonstrate 9 stands in 30 seconds unassisted from standard height chair demonstrating improved LE strength and endurance    Plan      Continue with plan of care as indicated.     Outpatient Physical Therapy 2 times weekly for 6 weeks to include the following interventions: Manual Therapy, Moist Heat/ Ice, Neuromuscular Re-ed, Patient Education, Therapeutic Activities and Therapeutic Exercise.     Alber Fowler, PT  2/8/2022

## 2022-02-10 ENCOUNTER — CLINICAL SUPPORT (OUTPATIENT)
Dept: REHABILITATION | Facility: HOSPITAL | Age: 77
End: 2022-02-10
Payer: MEDICARE

## 2022-02-10 DIAGNOSIS — M54.16 LUMBAR RADICULOPATHY, CHRONIC: Primary | ICD-10-CM

## 2022-02-10 PROCEDURE — 97530 THERAPEUTIC ACTIVITIES: CPT

## 2022-02-10 PROCEDURE — 97110 THERAPEUTIC EXERCISES: CPT

## 2022-02-10 NOTE — PROGRESS NOTES
"  Physical Therapy Treatment Note     Name: Usama Lee  Clinic Number: [id number]    Therapy Diagnosis: No diagnosis found.  Physician: Charbel Blake MD    Visit Date: 2/10/2022    Physician Orders: PT Eval and Treat  Medical Diagnosis from Referral: M54.16 Lumbar radiculopathy, chronic  Evaluation Date: 1/27/2022  Authorization Period Expiration: As medically necessary   Plan of Care Certification Period: 1/27/2022 to 3/11/2022  Updated Plan of Care Due: 3/11/2022  Visit # / Visits authorized: 4/12   PTA Visit #: 0    Time In: 9:25 AM  Time Out: 10:15 AM  Total Billable Time: 50 minutes    Precautions: Standard  Functional Level Prior to Evaluation: independent     Subjective     Pt reports: patient stated he feels very tired upon arrival, spent the day at a sleep lab appointment yesterday. Intermittent shooting pain and poor balance are his chief complaints.  He will be provided with HEP this visit.   Response to previous treatment: N/A  Functional change: no change noted    Pain: 8/10 when he experiences it; mostly shooting radicular pain that comes and goes  Location: N/A    Objective     Magen received therapeutic exercises to develop strength, endurance and flexibility for 40 minutes including:  Nu step 6mins Lv 2  Seated hamstring stretch 3x15 sec  Quad stretch-  SLR x15  Supine clamshells BTB x30  Seated alternating ankle DF<>PF x20  Seated heel raises 10x3 sec  B LAQ 7.5# x10 ea  Seated march x15 7.5#  B Hamstring curl GTB x15 ea  Mini squats x15  Step ups 4" in // bars x10 ea  Hip extension in // bars x10 ea  Standing march x20   SKTC 5x10 sec  Lumbar rotations 10x3 sec hold   HEP education    Magen participated in neuromuscular re-education activities to improve: Balance and Coordination for - minutes. The following activities were included:  Alternating toe taps 4" step-    Magen participated in dynamic functional therapeutic activities to improve functional performance and stability for 10  " minutes, including:  Stair negotiation-  Sit>stands from lowered level mat surface x5 (Min A-Mod A)  Transfer training with rollator:    Chair>mat x2   Mat>chair x2    Magen received hot pack for 15 minutes to lumbar while performing supine exercises.     Home Exercises Provided and Patient Education Provided     Education provided: On proper execution of exercises requiring verbal and tactile cues; HEP education on handout provided    Written Home Exercises Provided: yes.  Exercises were reviewed and Magen was able to demonstrate them prior to the end of the session.  Magen demonstrated good  understanding of the education provided.     See EMR under Media for exercises provided 2/4/2022.    Assessment     Patient tolerated treatment well but demonstrated quick fatigue and low endurance upon participating in standing activities. Patient required increased time and frequent rest breaks between exercises. PT provided education to ensure proper execution, decrease muscle substitutions, and for overall safety. Reports the pain radiating down his leg had gone away post therapy session but stated he was very fatigued. Patient stated his main issue now is poor balance and endurance, says his back pain is intermittent and once he gets moving, it usually becomes dull and less frequent.    Magen 's progression to be determined at later date.    Pt prognosis is Excellent.     Pt will continue to benefit from skilled outpatient physical therapy to address the deficits listed in the problem list box on initial evaluation, provide pt/family education and to maximize pt's level of independence in the home and community environment.     Pt's spiritual, cultural and educational needs considered and pt agreeable to plan of care and goals.     Anticipated barriers to physical therapy: none    Goals:  1. Patient will increase pain free ROM of cervical spine by 15% to reduce pain with checking blind spots while driving and looking up  as if to reach for something overhead  2. Patient will increase Tinetti score to 23/28 to reduce overall fall risks.  3. Patient will increase DGI score to 18/24  4. Patient will demonstrate 9 stands in 30 seconds unassisted from standard height chair demonstrating improved LE strength and endurance    Plan     Continue with plan of care as indicated.     Outpatient Physical Therapy 2 times weekly for 6 weeks to include the following interventions: Manual Therapy, Moist Heat/ Ice, Neuromuscular Re-ed, Patient Education, Therapeutic Activities and Therapeutic Exercise.     Alber Fowler, PT  2/10/2022

## 2022-02-14 ENCOUNTER — CLINICAL SUPPORT (OUTPATIENT)
Dept: REHABILITATION | Facility: HOSPITAL | Age: 77
End: 2022-02-14
Payer: MEDICARE

## 2022-02-14 DIAGNOSIS — M54.16 LUMBAR RADICULOPATHY, CHRONIC: Primary | ICD-10-CM

## 2022-02-14 PROCEDURE — 97116 GAIT TRAINING THERAPY: CPT | Mod: CQ

## 2022-02-14 PROCEDURE — 97110 THERAPEUTIC EXERCISES: CPT | Mod: CQ

## 2022-02-14 PROCEDURE — 97010 HOT OR COLD PACKS THERAPY: CPT | Mod: CQ

## 2022-02-14 PROCEDURE — 97530 THERAPEUTIC ACTIVITIES: CPT | Mod: CQ

## 2022-02-14 NOTE — PROGRESS NOTES
"  Physical Therapy Treatment Note     Name: Usama Lee  Clinic Number: [id number]    Therapy Diagnosis: No diagnosis found.  Physician: Charbel Blake MD    Visit Date: 2/14/2022    Physician Orders: PT Eval and Treat  Medical Diagnosis from Referral: M54.16 Lumbar radiculopathy, chronic  Evaluation Date: 1/27/2022  Authorization Period Expiration: As medically necessary   Plan of Care Certification Period: 1/27/2022 to 3/11/2022  Updated Plan of Care Due: 3/11/2022  Visit # / Visits authorized: 5/12   PTA Visit #: 1    Time In: 9:30 AM  Time Out: 10:24 AM  Total Billable Time: 54 minutes    Precautions: Standard  Functional Level Prior to Evaluation: independent     Subjective     Pt reports: patient states he is not having pain in the low back at time of arrival. Intermittent shooting pain and poor balance are his chief complaints.  He reports compliance with HEP.  Response to previous treatment: minimal soreness reported  Functional change: no change noted    Pain: 0/10 at rest  Location: low back     Objective     Magen received therapeutic exercises to develop strength, endurance and flexibility for 36 minutes including:  Nu step 7 mins L3  Seated hamstring stretch 3x15 sec  Sciatic nerve glide B x20   Quad stretch 3x20 sec (manual)  SLR x20 2#  Supine clamshells GYTB x30  Seated alternating HR/TR x20   B LAQ x30 10# ea  Seated march x30 10#  B Hamstring curl BTB x20 ea  Seated resisted hip / GYTB x30 ea  Mini squats x15  Step ups 4" in // bars -  Standing march -   SKTC 5x10 sec  Lumbar rotations 10x3 sec hold     Magen participated in dynamic functional therapeutic activities to improve functional performance and stability for 8 minutes, including:  Stair negotiation-  Sit>stands from lowered level mat surface x10 (Min A required)  Transfer training with rollator: (Min A due to unsteadiness)   Chair>mat x2   Mat>chair x2    Magen received gait to improve overall safety and quality of functional " mobility with the use of RW for 10 minutes, including:  Patient performed 200 feet with the use of rollator requiring Contact Guard Assistance and Minimal Assistance  Patient demonstrated the following gait deviations: unsteady gait, path deviation, flexed posture, cues for AD placement to improve overall safety, one standing rest break. Episodes of LOB requiring Min A for recovery.      Magen received hot pack for 15 minutes to lumbar while performing nu step and supine exercises.     Home Exercises Provided and Patient Education Provided     Education provided: On proper execution of exercises requiring verbal and tactile cues    Written Home Exercises Provided: Patient instructed to cont prior HEP.  Exercises were reviewed and Magen was able to demonstrate them prior to the end of the session.  Magen demonstrated good  understanding of the education provided.     See EMR under Media for exercises provided prior visit.    Assessment     Patient tolerated treatment well but demonstrated quick fatigue.. Patient required increased time and frequent rest breaks between exercises. PTA provided education to ensure proper execution, decrease muscle substitutions, and for overall safety during functional mobility with AD. Reports the pain radiating down his leg had gone away post therapy session but stated he was very fatigued. Patient tolerated increased resistance and added exercises well. Patient would continue to benefit from therapy to improve LE strength, gross motor coordination, and balance to prevent fall risks..     Magen is progressing towards goals.     Pt prognosis is Excellent.     Pt will continue to benefit from skilled outpatient physical therapy to address the deficits listed in the problem list box on initial evaluation, provide pt/family education and to maximize pt's level of independence in the home and community environment.     Pt's spiritual, cultural and educational needs considered and pt  agreeable to plan of care and goals.     Anticipated barriers to physical therapy: none    Goals:  1. Patient will increase pain free ROM of cervical spine by 15% to reduce pain with checking blind spots while driving and looking up as if to reach for something overhead  2. Patient will increase Tinetti score to 23/28 to reduce overall fall risks.  3. Patient will increase DGI score to 18/24  4. Patient will demonstrate 9 stands in 30 seconds unassisted from standard height chair demonstrating improved LE strength and endurance    Plan     Continue with plan of care as indicated.     Outpatient Physical Therapy 2 times weekly for 6 weeks to include the following interventions: Manual Therapy, Moist Heat/ Ice, Neuromuscular Re-ed, Patient Education, Therapeutic Activities and Therapeutic Exercise.     Brittney Glaser, PTA  2/14/2022

## 2022-02-15 PROCEDURE — 88305 TISSUE EXAM BY PATHOLOGIST: CPT | Mod: SUR | Performed by: DERMATOLOGY

## 2022-02-15 PROCEDURE — 88305 TISSUE EXAM BY PATHOLOGIST: CPT | Mod: 26,,, | Performed by: PATHOLOGY

## 2022-02-15 PROCEDURE — 88305 PATHOLOGY, DERMATOLOGY: ICD-10-PCS | Mod: 26,,, | Performed by: PATHOLOGY

## 2022-02-16 ENCOUNTER — LAB REQUISITION (OUTPATIENT)
Dept: LAB | Facility: HOSPITAL | Age: 77
End: 2022-02-16
Attending: DERMATOLOGY
Payer: MEDICARE

## 2022-02-16 ENCOUNTER — CLINICAL SUPPORT (OUTPATIENT)
Dept: REHABILITATION | Facility: HOSPITAL | Age: 77
End: 2022-02-16
Payer: MEDICARE

## 2022-02-16 DIAGNOSIS — M54.16 LUMBAR RADICULOPATHY, CHRONIC: Primary | ICD-10-CM

## 2022-02-16 DIAGNOSIS — D49.2 NEOPLASM OF UNSPECIFIED BEHAVIOR OF BONE, SOFT TISSUE, AND SKIN: ICD-10-CM

## 2022-02-16 PROCEDURE — 97116 GAIT TRAINING THERAPY: CPT | Mod: CQ

## 2022-02-16 PROCEDURE — 97110 THERAPEUTIC EXERCISES: CPT | Mod: CQ

## 2022-02-16 PROCEDURE — 97010 HOT OR COLD PACKS THERAPY: CPT | Mod: CQ

## 2022-02-16 NOTE — PROGRESS NOTES
"  Physical Therapy Treatment Note     Name: Usama Lee  Clinic Number: [id number]    Therapy Diagnosis: No diagnosis found.  Physician: Charbel Blake MD    Visit Date: 2/16/2022    Physician Orders: PT Eval and Treat  Medical Diagnosis from Referral: M54.16 Lumbar radiculopathy, chronic  Evaluation Date: 1/27/2022  Authorization Period Expiration: As medically necessary   Plan of Care Certification Period: 1/27/2022 to 3/11/2022  Updated Plan of Care Due: 3/11/2022  Visit # / Visits authorized: 6/12   PTA Visit #: 2    Time In: 9:26 AM  Time Out: 10:13 AM  Total Billable Time: 47 minutes    Precautions: Standard  Functional Level Prior to Evaluation: independent     Subjective     Pt reports: patient states he is not having pain in the low back at time of arrival. Poor balance and weakness continues to be his chief complaints.  He reports compliance with HEP.  Response to previous treatment: moderate soreness reported  Functional change: reports decreased radiating symptoms down into posterior thighs    Pain: 0/10 at rest  Location: low back     Objective     Magen received therapeutic exercises to develop strength, endurance and flexibility for 37 minutes including:  Nu step 6 mins L3  Seated hamstring stretch 3x15 sec  Sciatic nerve glide B x20   Quad stretch 3x20 sec (manual)  SLR x20 2#  Supine clamshells GYTB x30  Seated alternating HR x30 10#  B LAQ x30 10# ea  B seated march x30 10# ea  B Hamstring curl BTB x20 ea  Seated resisted hip / GYTB x30 ea  Mini squats x15  Step ups 4" in // bars -  Standing march - SKTC 5x10 sec  Lumbar rotations 10x3 sec hold   Sit>stands from lowered level mat surface x10 (Min A x1 with cues for AD and hand placement to increase overall safety)    Magen received gait to improve overall safety and quality of functional mobility with the use of RW for 10 minutes, including:  Patient performed 215 feet with the use of rollator requiring Contact Guard Assistance and " Minimal Assistance  Patient demonstrated the following gait deviations: unsteady gait, path deviation, flexed posture, cues for AD placement and increase heel strik to improve overall safety, one standing rest break. Episodes of LOB requiring Min A for recovery. Patient able to correct gait deviations with cues better today.      Magen received hot pack for 15 minutes to lumbar while performing nu step and supine exercises.     Home Exercises Provided and Patient Education Provided     Education provided: On proper execution of exercises requiring verbal and tactile cues    Written Home Exercises Provided: Patient instructed to cont prior HEP.  Exercises were reviewed and Magen was able to demonstrate them prior to the end of the session.  Magen demonstrated good  understanding of the education provided.     See EMR under Media for exercises provided prior visit.    Assessment     Patient tolerated treatment well but demonstrated quick fatigue.. Patient required increased time and frequent rest breaks between exercises. PTA provided education to ensure proper execution, decrease muscle substitutions, and for overall safety during functional mobility with AD. Reports the pain radiating down his leg had gone away post therapy session but stated he was very fatigued. Patient tolerated increased resistance and added exercises well. Patient would continue to benefit from therapy to improve LE strength, gross motor coordination, and balance to prevent fall risks..     Magen is progressing towards goals.     Pt prognosis is Excellent.     Pt will continue to benefit from skilled outpatient physical therapy to address the deficits listed in the problem list box on initial evaluation, provide pt/family education and to maximize pt's level of independence in the home and community environment.     Pt's spiritual, cultural and educational needs considered and pt agreeable to plan of care and goals.     Anticipated barriers to  physical therapy: none    Goals:  1. Patient will increase pain free ROM of cervical spine by 15% to reduce pain with checking blind spots while driving and looking up as if to reach for something overhead  2. Patient will increase Tinetti score to 23/28 to reduce overall fall risks.  3. Patient will increase DGI score to 18/24  4. Patient will demonstrate 9 stands in 30 seconds unassisted from standard height chair demonstrating improved LE strength and endurance    Plan     Continue with plan of care as indicated.     Outpatient Physical Therapy 2 times weekly for 6 weeks to include the following interventions: Manual Therapy, Moist Heat/ Ice, Neuromuscular Re-ed, Patient Education, Therapeutic Activities and Therapeutic Exercise.     Brittney Glaser, PTA  2/16/2022

## 2022-02-17 LAB
ESTROGEN SERPL-MCNC: NORMAL PG/ML
LAB AP GROSS DESCRIPTION: NORMAL
LAB AP LABORATORY NOTES: NORMAL
LAB AP SPEC A DDX: NORMAL
LAB AP SPEC A MORPHOLOGY: NORMAL
LAB AP SPEC A PROCEDURE: NORMAL
LAB AP SPEC B DDX: NORMAL
LAB AP SPEC B MORPHOLOGY: NORMAL
LAB AP SPEC B PROCEDURE: NORMAL
T3RU NFR SERPL: NORMAL %

## 2022-02-25 NOTE — PROGRESS NOTES
Progress Report  Mr. Lee is progressing towards set goals. Treatment has focused on LE strength and dynamic balance for reduction of fall risks during independent ambulation within his home and in the community as he has a significant history of falling. He reports improvement in low back back pain but states that it does not hurt as much and that he is most concerned about his stability while performing activities around his home. Continued care is required to address documented deficits for safest function, reduction of low back pain, and return to PLOF.     Goals: All In Progress  1. Patient will increase pain free ROM of cervical spine by 15% to reduce pain with checking blind spots while driving and looking up as if to reach for something overhead   2. Patient will increase Tinetti score to 23/28 to reduce overall fall risks.  3. Patient will increase DGI score to 18/24  4. Patient will demonstrate 9 stands in 30 seconds unassisted from standard height chair demonstrating improved LE strength and endurance    Plan     Continue with POC as stated in initial POC: 1/27/2022 to 3/11/2022  Outpatient Physical Therapy 2 times weekly for 6 weeks to include the following interventions: Manual Therapy, Moist Heat/ Ice, Neuromuscular Re-ed, Patient Education, Therapeutic Activities and Therapeutic Exercise.    Alber Fowler, PT  2/25/2022      I CERTIFY THE NEED FOR THESE SERVICES FURNISHED UNDER THIS PLAN OF TREATMENT AND WHILE UNDER MY CARE.    Physician's comments:      Physician's Signature: ___________________________________________________

## 2022-02-28 ENCOUNTER — CLINICAL SUPPORT (OUTPATIENT)
Dept: REHABILITATION | Facility: HOSPITAL | Age: 77
End: 2022-02-28
Payer: MEDICARE

## 2022-02-28 DIAGNOSIS — M54.16 LUMBAR RADICULOPATHY, CHRONIC: Primary | ICD-10-CM

## 2022-02-28 PROCEDURE — 97110 THERAPEUTIC EXERCISES: CPT | Mod: CQ

## 2022-02-28 PROCEDURE — 97116 GAIT TRAINING THERAPY: CPT | Mod: CQ

## 2022-02-28 NOTE — PROGRESS NOTES
"  Physical Therapy Treatment Note     Name: Usama Lee  Clinic Number: [id number]    Therapy Diagnosis: No diagnosis found.  Physician: Charbel Blake MD    Visit Date: 2/28/2022    Physician Orders: PT Eval and Treat  Medical Diagnosis from Referral: M54.16 Lumbar radiculopathy, chronic  Evaluation Date: 1/27/2022  Authorization Period Expiration: As medically necessary   Plan of Care Certification Period: 1/27/2022 to 3/11/2022  Updated Plan of Care Due: 3/11/2022  Visit # / Visits authorized: 7/12   PTA Visit #: 3  Progress note needed by: 3/16/2022    Time In: 9:33 AM  Time Out: 10:26 AM  Total Billable Time: 53 minutes    Precautions: Standard  Functional Level Prior to Evaluation: independent     Subjective     Pt reports: patient states he is not having pain in the low back at time of arrival. Poor balance and weakness continues to be his chief complaints.  He reports compliance with HEP.  Response to previous treatment: moderate soreness reported  Functional change: reports decreased radiating symptoms down into posterior thighs    Pain: 0/10 at rest  Location: low back     Objective     Magen received therapeutic exercises to develop strength, endurance and flexibility for 43 minutes including:  Nu step x8 mins L3  Seated hamstring stretch 3x15 sec  Sciatic nerve glide B x20   Quad stretch 4x20 sec (manual)  SLR x20 2#  Seated HR x30 10#  B LAQ x30 10# ea  B seated march 10# ea-  B Hamstring curl GYTB x20 ea  Seated resisted hip / GYTB x30 ea  Step ups 4" in // bars -  Standing hip abduction x15 ea  Standing march x20 ea  Mini squats x15  SKTC 5x10 sec  Lumbar rotations -  Sit>stands from lowered level mat surface x10 (Min A x1 with cues for AD and hand placement to increase overall safety)    Magen received gait to improve overall safety and quality of functional mobility with the use of RW for 10 minutes, including:  Patient performed 215 feet with the use of rollator requiring Contact Guard " Assistance and Minimal Assistance  Patient demonstrated the following gait deviations: unsteady gait, path deviation, flexed posture, cues for AD placement and increase heel strik to improve overall safety, one standing rest break. Episodes of LOB requiring Min A for recovery. Patient able to correct gait deviations with cues better today.      Magen received hot pack for 15 minutes to lumbar while performing nu step and supine exercises.     Home Exercises Provided and Patient Education Provided     Education provided: On proper execution of exercises requiring verbal and tactile cues    Written Home Exercises Provided: Patient instructed to cont prior HEP.  Exercises were reviewed and Magen was able to demonstrate them prior to the end of the session.  Magen demonstrated good  understanding of the education provided.     See EMR under Media for exercises provided prior visit.    Assessment     Patient tolerated treatment well but demonstrated quick fatigue.. Patient required increased time and frequent rest breaks between exercises. PTA provided education to ensure proper execution, decrease muscle substitutions, and for overall safety during functional mobility with AD. Patient tolerated increased resistance and added exercises well. Patient would continue to benefit from therapy to improve LE strength, gross motor coordination, and balance to prevent fall risks.    Magen is progressing towards goals.     Pt prognosis is Excellent.     Pt will continue to benefit from skilled outpatient physical therapy to address the deficits listed in the problem list box on initial evaluation, provide pt/family education and to maximize pt's level of independence in the home and community environment.     Pt's spiritual, cultural and educational needs considered and pt agreeable to plan of care and goals.     Anticipated barriers to physical therapy: none    Goals:  1. Patient will increase pain free ROM of cervical spine by  15% to reduce pain with checking blind spots while driving and looking up as if to reach for something overhead  2. Patient will increase Tinetti score to 23/28 to reduce overall fall risks.  3. Patient will increase DGI score to 18/24  4. Patient will demonstrate 9 stands in 30 seconds unassisted from standard height chair demonstrating improved LE strength and endurance    Plan     Continue with plan of care as indicated.     Outpatient Physical Therapy 2 times weekly for 6 weeks to include the following interventions: Manual Therapy, Moist Heat/ Ice, Neuromuscular Re-ed, Patient Education, Therapeutic Activities and Therapeutic Exercise.     Brittney Glaser, PTA  2/28/2022

## 2022-03-02 ENCOUNTER — CLINICAL SUPPORT (OUTPATIENT)
Dept: REHABILITATION | Facility: HOSPITAL | Age: 77
End: 2022-03-02
Payer: MEDICARE

## 2022-03-02 DIAGNOSIS — M54.16 LUMBAR RADICULOPATHY, CHRONIC: Primary | ICD-10-CM

## 2022-03-02 PROCEDURE — 97110 THERAPEUTIC EXERCISES: CPT

## 2022-03-02 NOTE — PROGRESS NOTES
"  Physical Therapy Treatment Note     Name: Usama Lee  Clinic Number: [id number]    Therapy Diagnosis:   Encounter Diagnosis   Name Primary?    Lumbar radiculopathy, chronic Yes     Physician: Charbel Blake MD    Visit Date: 3/2/2022    Physician Orders: PT Eval and Treat  Medical Diagnosis from Referral: M54.16 Lumbar radiculopathy, chronic  Evaluation Date: 1/27/2022  Authorization Period Expiration: As medically necessary   Plan of Care Certification Period: 1/27/2022 to 3/11/2022  Updated Plan of Care Due: 3/11/2022  Visit # / Visits authorized: 8/12   PTA Visit #: 0  Progress note needed by: 3/16/2022    Time In: 9:22 AM  Time Out: 10:09 AM  Total Billable Time: 47 minutes    Precautions: Standard  Functional Level Prior to Evaluation: independent     Subjective     Pt reports: patient states he is not having pain in the low back at time of arrival. Poor balance and weakness continues to be his chief complaints.  He reports compliance with HEP.  Response to previous treatment: moderate soreness reported  Functional change: reports decreased radiating symptoms down into posterior thighs    Pain: 0/10 at rest  Location: low back     Objective     Magen received therapeutic exercises to develop strength, endurance and flexibility for 47 minutes including:  Nu step x8 mins L3  Bridges x30  Sciatic nerve glide B x20   Quad stretch 4x20 sec (manual)  SLR x20   Leg Press x30 60#  Seated HR x30 10#  B LAQ x30 10# ea  B seated march 10# ea-  B Hamstring curl (machine) x30 ea 30#  Seated resisted hip / GYTB x30 ea  Step ups 6" in // bars - x20 ea  Seated hip abduction x40 BTB  Standing march x20 ea  Mini squats x30  SKTC 5x10 sec  Lumbar rotations -  Sit>stands from lowered level mat surface x10 (Min A x1 with cues for AD and hand placement to increase overall safety)    Magen received gait to improve overall safety and quality of functional mobility with the use of RW for 10 minutes, including:  Patient " performed 215 feet with the use of rollator requiring Contact Guard Assistance and Minimal Assistance  Patient demonstrated the following gait deviations: unsteady gait, path deviation, flexed posture, cues for AD placement and increase heel strik to improve overall safety, one standing rest break. Episodes of LOB requiring Min A for recovery. Patient able to correct gait deviations with cues better today.      Magen received hot pack for  minutes to lumbar while performing nu step and supine exercises.     Home Exercises Provided and Patient Education Provided     Education provided: On proper execution of exercises requiring verbal and tactile cues    Written Home Exercises Provided: Patient instructed to cont prior HEP.  Exercises were reviewed and Magen was able to demonstrate them prior to the end of the session.  Magen demonstrated good  understanding of the education provided.     See EMR under Media for exercises provided prior visit.    Assessment     Patient tolerated treatment well but demonstrated quick fatigue.. Patient required increased time and frequent rest breaks between exercises. PT provided education to ensure proper execution, decrease muscle substitutions, and for overall safety during functional mobility with AD. Patient tolerated increased resistance and added exercises well. Patient would continue to benefit from therapy to improve LE strength, gross motor coordination, and balance to prevent fall risks.    Magen is progressing towards goals.     Pt prognosis is Excellent.     Pt will continue to benefit from skilled outpatient physical therapy to address the deficits listed in the problem list box on initial evaluation, provide pt/family education and to maximize pt's level of independence in the home and community environment.     Pt's spiritual, cultural and educational needs considered and pt agreeable to plan of care and goals.     Anticipated barriers to physical therapy:  none    Goals:  1. Patient will increase pain free ROM of cervical spine by 15% to reduce pain with checking blind spots while driving and looking up as if to reach for something overhead  2. Patient will increase Tinetti score to 23/28 to reduce overall fall risks.  3. Patient will increase DGI score to 18/24  4. Patient will demonstrate 9 stands in 30 seconds unassisted from standard height chair demonstrating improved LE strength and endurance    Plan     Continue with plan of care as indicated.     Outpatient Physical Therapy 2 times weekly for 6 weeks to include the following interventions: Manual Therapy, Moist Heat/ Ice, Neuromuscular Re-ed, Patient Education, Therapeutic Activities and Therapeutic Exercise.     Alber Fowler, PT  3/2/2022

## 2022-03-07 ENCOUNTER — CLINICAL SUPPORT (OUTPATIENT)
Dept: REHABILITATION | Facility: HOSPITAL | Age: 77
End: 2022-03-07
Payer: MEDICARE

## 2022-03-07 DIAGNOSIS — M54.16 LUMBAR RADICULOPATHY, CHRONIC: Primary | ICD-10-CM

## 2022-03-07 PROCEDURE — 97116 GAIT TRAINING THERAPY: CPT | Mod: CQ

## 2022-03-07 PROCEDURE — 97110 THERAPEUTIC EXERCISES: CPT | Mod: CQ

## 2022-03-07 NOTE — PROGRESS NOTES
"  Physical Therapy Treatment Note     Name: Usama Lee  Clinic Number: [id number]    Therapy Diagnosis:   No diagnosis found.  Physician: Charbel Blake MD    Visit Date: 3/7/2022    Physician Orders: PT Eval and Treat  Medical Diagnosis from Referral: M54.16 Lumbar radiculopathy, chronic  Evaluation Date: 1/27/2022  Authorization Period Expiration: As medically necessary   Plan of Care Certification Period: 1/27/2022 to 3/11/2022  Updated Plan of Care Due: 3/11/2022  Visit # / Visits authorized: 9/12   PTA Visit #: 1  Progress note needed by: 3/16/2022    Time In: 9:28 AM  Time Out: 10:12 AM  Total Billable Time: 44 minutes    Precautions: Standard  Functional Level Prior to Evaluation: independent     Subjective     Pt reports: patient states his back is feeling pretty good. Poor balance and weakness continues to be his chief complaints.  He reports compliance with HEP.  Response to previous treatment: moderate soreness reported  Functional change: reports decreased radiating symptoms down into posterior thighs    Pain: 0/10 at rest  Location: low back     Objective     Magen received therapeutic exercises to develop strength, endurance and flexibility for 36 minutes including:  Nu step x8 mins L3  Bridges x30  Sciatic nerve glide B -   Quad stretch 4x20 sec (manual)  SAQ x30 5#  Seated HR x40 10#  B seated march 10# x30 ea  Seated resisted hip / GYTB x30 ea  Step ups 6" in // bars -  Seated hip abduction x40 GYTB  Standing march -  Mini squats x30  SKTC 5x10 sec   Lumbar rotations -  Sit>stands from lowered level mat surface x10 (Min A x1 with cues for AD and hand placement to increase overall safety)  B Hamstring curl (machine) x30 40#  B LAQ x30 40#  Leg Press x30 70#    Magen received gait to improve overall safety and quality of functional mobility with the use of RW for 8 minutes, including:  Patient performed 215 feet with the use of rollator requiring Contact Guard Assistance and Minimal " Assistance  Patient demonstrated the following gait deviations: unsteady gait, path deviation, flexed posture, cues for AD placement and increase heel strik to improve overall safety, one standing rest break. Episodes of LOB requiring Min A for recovery. Patient able to correct gait deviations with cues better today.      Magen received hot pack for 8 minutes to lumbar while performing nu step.     Home Exercises Provided and Patient Education Provided     Education provided: On proper execution of exercises requiring verbal and tactile cues    Written Home Exercises Provided: Patient instructed to cont prior HEP.  Exercises were reviewed and Magen was able to demonstrate them prior to the end of the session.  Magen demonstrated good  understanding of the education provided.     See EMR under Media for exercises provided prior visit.    Assessment     Patient tolerated treatment well but demonstrated quick fatigue. Patient required increased time and frequent rest breaks between exercises. PTA provided education to ensure proper execution, decrease muscle substitutions, and for overall safety during functional mobility with AD. Patient tolerated increased resistance and added exercises well. Continues to demonstrate lack of safety awareness during functional mobility with poor placement of AD and instability. Patient would continue to benefit from therapy to improve LE strength, gross motor coordination, and balance to prevent fall risks.    Magen is progressing towards goals.     Pt prognosis is Excellent.     Pt will continue to benefit from skilled outpatient physical therapy to address the deficits listed in the problem list box on initial evaluation, provide pt/family education and to maximize pt's level of independence in the home and community environment.     Pt's spiritual, cultural and educational needs considered and pt agreeable to plan of care and goals.     Anticipated barriers to physical therapy:  none    Goals:  1. Patient will increase pain free ROM of cervical spine by 15% to reduce pain with checking blind spots while driving and looking up as if to reach for something overhead  2. Patient will increase Tinetti score to 23/28 to reduce overall fall risks.  3. Patient will increase DGI score to 18/24  4. Patient will demonstrate 9 stands in 30 seconds unassisted from standard height chair demonstrating improved LE strength and endurance    Plan     Continue with plan of care as indicated.     Outpatient Physical Therapy 2 times weekly for 6 weeks to include the following interventions: Manual Therapy, Moist Heat/ Ice, Neuromuscular Re-ed, Patient Education, Therapeutic Activities and Therapeutic Exercise.     Brittney Glaser, PTA  3/7/2022

## 2022-03-09 ENCOUNTER — CLINICAL SUPPORT (OUTPATIENT)
Dept: REHABILITATION | Facility: HOSPITAL | Age: 77
End: 2022-03-09
Payer: MEDICARE

## 2022-03-09 DIAGNOSIS — M54.16 LUMBAR RADICULOPATHY, CHRONIC: Primary | ICD-10-CM

## 2022-03-09 PROCEDURE — 97112 NEUROMUSCULAR REEDUCATION: CPT

## 2022-03-09 PROCEDURE — 97110 THERAPEUTIC EXERCISES: CPT

## 2022-03-09 NOTE — PROGRESS NOTES
"  Physical Therapy Treatment Note     Name: Usama Lee  Clinic Number: [id number]    Therapy Diagnosis:   Encounter Diagnosis   Name Primary?    Lumbar radiculopathy, chronic Yes     Physician: Charbel Blake MD    Visit Date: 3/9/2022    Physician Orders: PT Eval and Treat  Medical Diagnosis from Referral: M54.16 Lumbar radiculopathy, chronic  Evaluation Date: 1/27/2022  Authorization Period Expiration: As medically necessary   Plan of Care Certification Period: 1/27/2022 to 3/11/2022  Updated Plan of Care Due: 3/11/2022  Visit # / Visits authorized: 10/12   PTA Visit #: 0  Progress note needed by: 3/16/2022    Time In: 9:28 AM  Time Out: 10:12 AM  Total Billable Time: 44 minutes    Precautions: Standard  Functional Level Prior to Evaluation: independent     Subjective     Pt reports: patient states his back is feeling pretty good.   He reports compliance with HEP.  Response to previous treatment: moderate soreness reported  Functional change: reports decreased radiating symptoms down into posterior thighs    Pain: 0/10 at rest  Location: low back     Objective     Magen received therapeutic exercises to develop strength, endurance and flexibility for 36 minutes including:  Nu step x8 mins L3  Bridges x30  Sciatic nerve glide B -   Quad stretch 4x20 sec (manual)  SAQ x30 5#  Seated HR x40 10#  B seated march 10# x30 ea  Seated resisted hip / GYTB x30 ea  Step ups 6" in // bars -  Seated hip abduction x40 GYTB  Standing march -  Mini squats x30  SKTC 5x10 sec   Lumbar rotations -  Sit>stands from lowered level mat surface x20 (without UE support)  B Hamstring curl (machine) x30 40#  B LAQ x30 40#  Leg Press x30 70#    Magen received gait to improve overall safety and quality of functional mobility with the use of RW for 8 minutes, including:  Patient performed 215 feet with the use of rollator requiring Contact Guard Assistance and Minimal Assistance  Patient demonstrated the following gait deviations: " unsteady gait, path deviation, flexed posture, cues for AD placement and increase heel strik to improve overall safety, one standing rest break. Episodes of LOB requiring Min A for recovery. Patient able to correct gait deviations with cues better today.      Magen received hot pack for 8 minutes to lumbar while performing nu step.     Home Exercises Provided and Patient Education Provided     Education provided: On proper execution of exercises requiring verbal and tactile cues    Written Home Exercises Provided: Patient instructed to cont prior HEP.  Exercises were reviewed and Magen was able to demonstrate them prior to the end of the session.  Magen demonstrated good  understanding of the education provided.     See EMR under Media for exercises provided prior visit.    Assessment     Magen is progressing towards goals.     Pt prognosis is Excellent.     Pt will continue to benefit from skilled outpatient physical therapy to address the deficits listed in the problem list box on initial evaluation, provide pt/family education and to maximize pt's level of independence in the home and community environment.     Pt's spiritual, cultural and educational needs considered and pt agreeable to plan of care and goals.     Anticipated barriers to physical therapy: none    Goals:  1. Patient will increase pain free ROM of cervical spine by 15% to reduce pain with checking blind spots while driving and looking up as if to reach for something overhead  2. Patient will increase Tinetti score to 23/28 to reduce overall fall risks.  3. Patient will increase DGI score to 18/24  4. Patient will demonstrate 9 stands in 30 seconds unassisted from standard height chair demonstrating improved LE strength and endurance    Plan     Continue with plan of care as indicated.     Outpatient Physical Therapy 2 times weekly for 6 weeks to include the following interventions: Manual Therapy, Moist Heat/ Ice, Neuromuscular Re-ed, Patient  Education, Therapeutic Activities and Therapeutic Exercise.     Alber Fowelr, PT  3/9/2022

## 2022-03-09 NOTE — PLAN OF CARE
Reasons for Recertification of Therapy: Patient is improving with LE strength and safety during ambulation but continues to have difficulty with proprioception and dynamic balance that greatly affects his fall risks. He has not reported any falls since baseline evaluation and reports less pain in his cervical and lumbar spine. Continued care is required to address documented deficits for optimum safety with functional activities.     Plan     Updated Certification Period: 3/11/2022 to 4/1/2022  Recommended Treatment Plan: 2 times per week for 4 weeks: Gait Training, Manual Therapy, Moist Heat/ Ice, Neuromuscular Re-ed, Patient Education, Therapeutic Activities and Therapeutic Exercise  Other Recommendations:     Alber Fowler, PT  3/9/2022      I CERTIFY THE NEED FOR THESE SERVICES FURNISHED UNDER THIS PLAN OF TREATMENT AND WHILE UNDER MY CARE.    Physician's comments:      Physician's Signature: ___________________________________________________

## 2022-03-11 DIAGNOSIS — Z71.89 COMPLEX CARE COORDINATION: ICD-10-CM

## 2022-03-14 ENCOUNTER — CLINICAL SUPPORT (OUTPATIENT)
Dept: REHABILITATION | Facility: HOSPITAL | Age: 77
End: 2022-03-14
Payer: MEDICARE

## 2022-03-14 DIAGNOSIS — M54.16 LUMBAR RADICULOPATHY, CHRONIC: Primary | ICD-10-CM

## 2022-03-14 PROCEDURE — 97110 THERAPEUTIC EXERCISES: CPT | Mod: CQ

## 2022-03-14 NOTE — PROGRESS NOTES
"  Physical Therapy Treatment Note     Name: Usama Lee  Clinic Number: [id number]    Therapy Diagnosis:   No diagnosis found.  Physician: Charbel Blake MD    Visit Date: 3/14/2022    Physician Orders: PT Eval and Treat  Medical Diagnosis from Referral: M54.16 Lumbar radiculopathy, chronic  Evaluation Date: 1/27/2022  Authorization Period Expiration: As medically necessary   Plan of Care Certification Period: 3/11/2022 to 4/1/2022  Updated Plan of Care Due: 4/1/2022  Visit # / Visits authorized: 11/12   PTA Visit #: 1  Progress note needed by: 4/6/2022    Time In: 9:36 AM  Time Out: 10:20 AM  Total Billable Time: 44 minutes    Precautions: Standard  Functional Level Prior to Evaluation: independent     Subjective     Pt reports: patient states his back feels ok. Reports having a fall prior to therapy appointment today when trying to load rollator into car. States he landed on his bottom, but he feels ok. (first fall since beginning therapy)  He reports compliance with HEP.  Response to previous treatment: moderate soreness reported  Functional change: reports decreased radiating symptoms down into posterior thighs    Pain: 0/10 at rest  Location: low back     Objective     Magen received therapeutic exercises to develop strength, endurance and flexibility for 44 minutes including:  Nu step x8 mins L5  Bridges -  Sciatic nerve glide B -   Quad stretch -  SAQ 5#-  Seated HR x40 10#  B seated march 10# -  Seated resisted hip / GYTB -  Step ups 4" in // bars x15 ea 5#  Standing march x15 ea 5#  Standing hip flexion x15 ea 5#  Standing hip extension x15 ea 5#  Standing hip abduction x15 ea 5#  Mini squats x30   Sit>stands from lowered level mat surface x20 (without UE support)  B Hamstring curl (machine) x30 50#  B LAQ x30 40#  Leg Press x30 90#    Magen received gait to improve overall safety and quality of functional mobility with the use of RW for - minutes, including:  Patient performed 215 feet with the " use of rollator requiring Contact Guard Assistance and Minimal Assistance  Patient demonstrated the following gait deviations: unsteady gait, path deviation, flexed posture, cues for AD placement and increase heel strik to improve overall safety, one standing rest break. Episodes of LOB requiring Min A for recovery. Patient able to correct gait deviations with cues better today.      Magen received hot pack for - minutes to lumbar while performing nu step.     Home Exercises Provided and Patient Education Provided     Education provided: On proper execution of exercises requiring verbal and tactile cues    Written Home Exercises Provided: Patient instructed to cont prior HEP.  Exercises were reviewed and Magen was able to demonstrate them prior to the end of the session.  Magen demonstrated good  understanding of the education provided.     See EMR under Media for exercises provided prior visit.    Assessment     Patient tolerated treatment well with treatment focused on standing exercises. Patient demonstrating progression towards goals with increasing overall resistance and control of exercises. Continues to require cues for lack of endurance requiring multiple rest breaks and safety awareness with lack of carryover assessed during functional transfers with the use of AD. PTA assessed improved heel strike and fluency of gait pattern while ambulating in PT gym. Patient would continue to benefit from therapy.   Magen is progressing towards goals.     Pt prognosis is Excellent.     Pt will continue to benefit from skilled outpatient physical therapy to address the deficits listed in the problem list box on initial evaluation, provide pt/family education and to maximize pt's level of independence in the home and community environment.     Pt's spiritual, cultural and educational needs considered and pt agreeable to plan of care and goals.     Anticipated barriers to physical therapy: none    Goals:  1. Patient will  increase pain free ROM of cervical spine by 15% to reduce pain with checking blind spots while driving and looking up as if to reach for something overhead  2. Patient will increase Tinetti score to 23/28 to reduce overall fall risks.  3. Patient will increase DGI score to 18/24  4. Patient will demonstrate 9 stands in 30 seconds unassisted from standard height chair demonstrating improved LE strength and endurance    Plan     Continue with plan of care as indicated.     Outpatient Physical Therapy 2 times weekly for 6 weeks to include the following interventions: Manual Therapy, Moist Heat/ Ice, Neuromuscular Re-ed, Patient Education, Therapeutic Activities and Therapeutic Exercise.     Brittney Glaser, PTA  3/14/2022

## 2022-03-16 ENCOUNTER — CLINICAL SUPPORT (OUTPATIENT)
Dept: REHABILITATION | Facility: HOSPITAL | Age: 77
End: 2022-03-16
Payer: MEDICARE

## 2022-03-16 DIAGNOSIS — M54.16 LUMBAR RADICULOPATHY, CHRONIC: Primary | ICD-10-CM

## 2022-03-16 PROCEDURE — 97110 THERAPEUTIC EXERCISES: CPT | Mod: CQ

## 2022-03-16 NOTE — PROGRESS NOTES
"  Physical Therapy Treatment Note     Name: Usama Lee  Clinic Number: [id number]    Therapy Diagnosis:   No diagnosis found.  Physician: Charbel Blake MD    Visit Date: 3/16/2022    Physician Orders: PT Eval and Treat  Medical Diagnosis from Referral: M54.16 Lumbar radiculopathy, chronic  Evaluation Date: 1/27/2022  Authorization Period Expiration: As medically necessary   Plan of Care Certification Period: 3/11/2022 to 4/1/2022  Updated Plan of Care Due: 4/1/2022  Visit # / Visits authorized: 12/12   PTA Visit #: 2  Progress note needed by: 4/6/2022    Time In: 10:17 AM  Time Out: 10:59 AM  Total Billable Time: 42 minutes    Precautions: Standard  Functional Level Prior to Evaluation: independent     Subjective     Pt reports: patient states his back feels ok but is having more radiating symptoms down into legs  He reports compliance with HEP.  Response to previous treatment: moderate soreness reported  Functional change: no further change noted    Pain: 0/10 at rest  Location: low back     Objective     Magen received therapeutic exercises to develop strength, endurance and flexibility for 42 minutes including:  Nu step x8 mins L5  Bridges -  Sciatic nerve glide B x20 ea  Quad stretch -  SAQ 5#-  Seated HR x40 10#  B seated march 10# -  Seated resisted hip / GYTB -  Step ups 4" in // bars x15 ea 5#  Standing march x20 ea 5#  Standing hip flexion x20 ea 5#  Standing hip extension x20 ea 5#  Standing hip abduction x20 ea 5#  Mini squats x30   Sit>stands from lowered level mat surface x20 (without UE support)  B Hamstring curl (machine) x30 50#  B LAQ x30 50#  B Leg Press x30 90#    Magen received gait to improve overall safety and quality of functional mobility with the use of RW for - minutes, including:  Patient performed 215 feet with the use of rollator requiring Contact Guard Assistance and Minimal Assistance  Patient demonstrated the following gait deviations: unsteady gait, path deviation, flexed " posture, cues for AD placement and increase heel strik to improve overall safety, one standing rest break. Episodes of LOB requiring Min A for recovery. Patient able to correct gait deviations with cues better today.      Magen received hot pack for - minutes to lumbar while performing nu step.     Home Exercises Provided and Patient Education Provided     Education provided: On proper execution of exercises requiring verbal and tactile cues    Written Home Exercises Provided: Patient instructed to cont prior HEP.  Exercises were reviewed and Magen was able to demonstrate them prior to the end of the session.  Magen demonstrated good  understanding of the education provided.     See EMR under Media for exercises provided prior visit.    Assessment     Patient tolerated treatment well with treatment focused on standing exercises. Patient demonstrating progression towards goals with increasing overall resistance and control of exercises. Continues to require cues for lack of endurance requiring multiple rest breaks and safety awareness with lack of carryover assessed during functional transfers with the use of AD. PTA assessed improved heel strike and fluency of gait pattern while ambulating in PT gym. Patient would continue to benefit from therapy.     Magen is progressing towards goals.     Pt prognosis is Excellent.     Pt will continue to benefit from skilled outpatient physical therapy to address the deficits listed in the problem list box on initial evaluation, provide pt/family education and to maximize pt's level of independence in the home and community environment.     Pt's spiritual, cultural and educational needs considered and pt agreeable to plan of care and goals.     Anticipated barriers to physical therapy: none    Goals:  1. Patient will increase pain free ROM of cervical spine by 15% to reduce pain with checking blind spots while driving and looking up as if to reach for something overhead  2.  Patient will increase Tinetti score to 23/28 to reduce overall fall risks.  3. Patient will increase DGI score to 18/24  4. Patient will demonstrate 9 stands in 30 seconds unassisted from standard height chair demonstrating improved LE strength and endurance    Plan     Continue with plan of care as indicated.     Outpatient Physical Therapy 2 times weekly for 6 weeks to include the following interventions: Manual Therapy, Moist Heat/ Ice, Neuromuscular Re-ed, Patient Education, Therapeutic Activities and Therapeutic Exercise.     Brittney Glaser, PTA  3/16/2022

## 2022-03-21 ENCOUNTER — CLINICAL SUPPORT (OUTPATIENT)
Dept: REHABILITATION | Facility: HOSPITAL | Age: 77
End: 2022-03-21
Payer: MEDICARE

## 2022-03-21 DIAGNOSIS — M54.16 LUMBAR RADICULOPATHY, CHRONIC: Primary | ICD-10-CM

## 2022-03-21 PROCEDURE — 97110 THERAPEUTIC EXERCISES: CPT | Mod: CQ

## 2022-03-21 NOTE — PROGRESS NOTES
"  Physical Therapy Treatment Note     Name: Usama Lee  Clinic Number: [id number]    Therapy Diagnosis:   No diagnosis found.  Physician: Charbel Blake MD    Visit Date: 3/21/2022    Physician Orders: PT Eval and Treat  Medical Diagnosis from Referral: M54.16 Lumbar radiculopathy, chronic  Evaluation Date: 1/27/2022  Authorization Period Expiration: As medically necessary   Plan of Care Certification Period: 3/11/2022 to 4/1/2022  Updated Plan of Care Due: 4/1/2022  Visit # / Visits authorized: 13/20   PTA Visit #: 3  Progress note needed by: 4/6/2022    Time In: 9:30 AM  Time Out: 10:01 AM  Total Billable Time: 31 minutes    Precautions: Standard  Functional Level Prior to Evaluation: independent     Subjective     Pt reports: Patient states he woke up in the middle of the night with left leg pain. Patient requesting quicker treatment today stating, "I need to get out of here early today. I've got a lot of things I need to take care of."   He reports compliance with HEP.  Response to previous treatment: moderate soreness reported  Functional change: no further change noted    Pain: 3/10 at rest  Location: left leg     Objective     Magen received therapeutic exercises to develop strength, endurance and flexibility for 31 minutes including:  Nu step x6 mins L5  Sciatic nerve glide B x20 ea  Quad stretch -  Seated HR 10#-  B seated march 10# -  Seated resisted hip / GYTB -  Step ups 4" in // bars 5#-  Standing march x20 ea 5#  Standing hip flexion x20 ea 5#  Standing hip extension x20 ea 5#  Standing hip abduction x20 ea 5#  Mini squats x30   Sit>stands from lowered level mat surface x20 (without UE support)  B Hamstring curl (machine) x30 50#  B LAQ x30 50#  B Leg Press x30 90#    Magen received gait to improve overall safety and quality of functional mobility with the use of RW for - minutes, including:  Patient performed 215 feet with the use of rollator requiring Contact Guard Assistance and Minimal " Assistance  Patient demonstrated the following gait deviations: unsteady gait, path deviation, flexed posture, cues for AD placement and increase heel strik to improve overall safety, one standing rest break. Episodes of LOB requiring Min A for recovery. Patient able to correct gait deviations with cues better today.      Magen received hot pack for - minutes to lumbar while performing nu step.     Home Exercises Provided and Patient Education Provided     Education provided: On proper execution of exercises requiring verbal and tactile cues    Written Home Exercises Provided: Patient instructed to cont prior HEP.  Exercises were reviewed and Magen was able to demonstrate them prior to the end of the session.  Magen demonstrated good  understanding of the education provided.     See EMR under Media for exercises provided prior visit.    Assessment     Patient tolerated treatment well with treatment focused on standing exercises. Patient demonstrating progression towards goals with increasing overall resistance and control of exercises. Continues to require cues for safety awareness with lack of carryover assessed during functional transfers with the use of AD. Patient continues to demonstrate lack of endurance requiring multiple rest breaks. PTA assessed improved heel strike and fluency of gait pattern while ambulating in PT gym. Patient would continue to benefit from therapy.     Magen is progressing towards goals.     Pt prognosis is Excellent.     Pt will continue to benefit from skilled outpatient physical therapy to address the deficits listed in the problem list box on initial evaluation, provide pt/family education and to maximize pt's level of independence in the home and community environment.     Pt's spiritual, cultural and educational needs considered and pt agreeable to plan of care and goals.     Anticipated barriers to physical therapy: none    Goals:  1. Patient will increase pain free ROM of  cervical spine by 15% to reduce pain with checking blind spots while driving and looking up as if to reach for something overhead  2. Patient will increase Tinetti score to 23/28 to reduce overall fall risks.  3. Patient will increase DGI score to 18/24  4. Patient will demonstrate 9 stands in 30 seconds unassisted from standard height chair demonstrating improved LE strength and endurance    Plan     Continue with plan of care as indicated.     Outpatient Physical Therapy 2 times weekly for 6 weeks to include the following interventions: Manual Therapy, Moist Heat/ Ice, Neuromuscular Re-ed, Patient Education, Therapeutic Activities and Therapeutic Exercise.     Brittney Glaser, PTA  3/21/2022

## 2022-03-23 ENCOUNTER — OFFICE VISIT (OUTPATIENT)
Dept: SPINE | Facility: CLINIC | Age: 77
End: 2022-03-23
Payer: MEDICARE

## 2022-03-23 DIAGNOSIS — M48.02 SPINAL STENOSIS, CERVICAL REGION: ICD-10-CM

## 2022-03-23 DIAGNOSIS — Z79.899 ENCOUNTER FOR LONG-TERM (CURRENT) USE OF MEDICATIONS: ICD-10-CM

## 2022-03-23 DIAGNOSIS — Z79.01 LONG TERM CURRENT USE OF ANTICOAGULANT THERAPY: ICD-10-CM

## 2022-03-23 DIAGNOSIS — Z01.818 ENCOUNTER FOR PRE-OPERATIVE EXAMINATION: ICD-10-CM

## 2022-03-23 DIAGNOSIS — M54.16 LUMBAR RADICULOPATHY, CHRONIC: Primary | ICD-10-CM

## 2022-03-23 DIAGNOSIS — G95.9 CERVICAL MYELOPATHY: ICD-10-CM

## 2022-03-23 PROCEDURE — 99214 OFFICE O/P EST MOD 30 MIN: CPT | Mod: S$PBB,,, | Performed by: ORTHOPAEDIC SURGERY

## 2022-03-23 PROCEDURE — 99214 PR OFFICE/OUTPT VISIT, EST, LEVL IV, 30-39 MIN: ICD-10-PCS | Mod: S$PBB,,, | Performed by: ORTHOPAEDIC SURGERY

## 2022-03-23 PROCEDURE — 99214 OFFICE O/P EST MOD 30 MIN: CPT | Mod: PBBFAC | Performed by: ORTHOPAEDIC SURGERY

## 2022-03-26 NOTE — PROGRESS NOTES
MDM/time:  Greater than 30 minutes spent on this encounter including 10 minutes reviewing imaging and notes, 15 minutes with the patient, 5 minutes documentation    ASSESSMENT:  76 y.o. male with cervical spondylolisthesis with myelopathy and lumbar spondylosis with radiculopathy and moderate bilateral hip arthritis    PLAN:  He would like to proceed with surgery for cervical myelopathy.  This will be a C3-C7 laminectomy and fusion     HPI:  76 y.o. male here for repeat evaluation of lower back pain that radiates down bilateral legs.  Denies any new injuries.  He has completed physical therapy without relief.  Reports bilateral lower extremities are worse.  It is also complaining of worsening balance and hand dysfunction.  He had 2 injections with Dr. Swain at [facility] without relief.  Patient reports history of peripheral neuropathy and relates his balance issues to this.  Patient reports decreased  strength in bilateral hands.  Difficulty with balance has had multiple falls walks with a cane.  Denies bladder incontinence difficulty walking more than 100 yd due to shortness of breath weakness in his legs.  Currently taking Neurontin 400 mg 2 tablets twice a day and Aleve as needed for pain.  History of L4-5 fusion in . Patient does not smoke.  Patient has a pacemaker Dr. Moody is his cardiologist and is on Eliquis.    IMAGIN2021 cervical spine x-ray reviewed showed:  On the AP there is normal coronal alignment.  There is uncovertebral and facet hypertrophy seen.  On the lateral there is decreased cervical lordosis.  There are spondylotic changes noted with decrease in disc height and osteophyte formation seen.  There is retrolisthesis at C3-4.    CT myelogram cervical spine 2021 reviewed shows:  At C3-4 there is moderate central severe bilateral foraminal stenosis  At C4-5 there is moderate central severe bilateral foraminal stenosis  At C5-6 there is moderate central  stenosis  At C6-7 there is severe central and bilateral foraminal stenosis    11/1/2021 lumbar spine x-ray reviewed showed:  On the AP there is normal coronal alignment.  There are 5 non-rib-bearing lumbar vertebrae.  On the lateral there is decreased lumbar lordosis.  There is spondylotic disease with decreased disc height and osteophyte formation noted.  There is apparent fusion at L4-5.  No fractures or listhesis noted.  No instability on flexion-extension views.  Moderate bilateral hip arthritis.    CT myelogram 11/18/2021 lumbar spine reviewed shows:  At L1-2 there is moderate bilateral lateral recess stenosis and left greater than right foraminal stenosis  At L2-3 there is moderate central and severe bilateral lateral recess stenosis  At L3-4 there is severe central bilateral lateral recess stenosis and severe bilateral foraminal stenosis  At L4-5 there is prior fusion with severe central bilateral lateral recess stenosis and severe bilateral foraminal stenosis  At L5-S1 there is severe bilateral foraminal stenosis  Past Medical History:   Diagnosis Date    Diabetes     Gout     Heart disease     High cholesterol     HTN (hypertension)     Parkinson disease      Past Surgical History:   Procedure Laterality Date    FLEXOR TENDON REPAIR Left 8/19/2021    Procedure: REPAIR, TENDON, FLEXOR;  Surgeon: Daniel Dempsey MD;  Location: [facility];  Service: Orthopedics;  Laterality: Left;    TOTAL KNEE ARTHROPLASTY Right     TRIGGER FINGER RELEASE Left 8/19/2021    Procedure: RELEASE, TRIGGER FINGER,RING;  Surgeon: Daniel Dempsey MD;  Location: [facility] OR;  Service: Orthopedics;  Laterality: Left;     Social History     Tobacco Use    Smoking status: Never Smoker    Smokeless tobacco: Never Used   Substance Use Topics    Alcohol use: Not Currently      Current Outpatient Medications   Medication Instructions    allopurinoL (ZYLOPRIM) 300 MG tablet TAKE 1 TABLET(S) BY MOUTH DAILY     allopurinoL (ZYLOPRIM) 300 MG tablet 1 tablet    apixaban (ELIQUIS) 2.5 mg Tab as directed    apixaban (ELIQUIS) 5 mg Tab 1 tablet, Oral, Every 12 hours    aspirin (ECOTRIN) 81 MG EC tablet 1 tablet, Oral, Daily    aspirin 81 MG Chew 1 tablet    atorvastatin (LIPITOR) 40 MG tablet 1 tablet    atorvastatin (LIPITOR) 80 MG tablet TAKE ONE-HALF TABLET BY MOUTH DAILY FOR CHOLESTEROL. STOP MEDICATION AND CALL PROVIDER FOR ANY UNEXPLAINED MUSCLE ACHES,PAIN OR WEAKNESS    benztropine (COGENTIN) 1 MG tablet 1 tablet, Oral, Nightly    benztropine (COGENTIN) 1 MG tablet 1 tablet at bedtime    carbidopa-levodopa  mg (SINEMET)  mg per tablet Oral    carbidopa-levodopa  mg (SINEMET)  mg per tablet 1 tablet    carvediloL (COREG) 25 MG tablet TAKE ONE TABLET BY MOUTH 2 TIMES A DAY FOR HEART    carvediloL (COREG) 25 MG tablet 1/2    doxycycline (MONODOX) 100 MG capsule TAKE ONE CAPSULE BY MOUTH DAILY WITH FOOD    ferrous sulfate (FEOSOL) 325 mg (65 mg iron) Tab tablet Oral    fluticasone propionate (FLONASE) 50 mcg/actuation nasal spray INSTILL 1 SPRAY IN NOSTRIL(S) DAILY    gabapentin (NEURONTIN) 400 MG capsule TAKE 1 CAPSULE BY MOUTH FOUR TIMES A DAY FOR NERVE PAIN    gabapentin (NEURONTIN) 400 MG capsule 1 capsule    glipiZIDE (GLUCOTROL) 10 MG tablet 1 tablet    glipiZIDE (GLUCOTROL) 5 MG tablet TAKE ONE TABLET BY MOUTH 2 TIMES A DAY FOR DIABETES    isosorbide mononitrate (IMDUR) 30 MG 24 hr tablet 1 tablet, Oral, Daily    nicotine polacrilex 2 MG Lozg DISSOLVE 1 LOZENGE IN MOUTH EVERY 2 HOURS AS NEEDED FOR SMOKING CESSATION    nitroGLYCERIN (NITROSTAT) 0.4 MG SL tablet DISSOLVE ONE TABLET UNDER THE TONGUE EVERY 5 MINUTES AS NEEDED    pantoprazole (PROTONIX) 40 MG tablet TAKE ONE TABLET BY MOUTH DAILY FOR STOMACH    pantoprazole (PROTONIX) 40 MG tablet 1 tablet    tadalafiL (CIALIS) 20 MG Tab TAKE 1/2 TABLET BY MOUTH ONE hour BEFORE sex    tamsulosin (FLOMAX) 0.4 mg Cap TAKE  1 CAPSULE BY MOUTH EVERY EVENING FOR URINATION. TAKE 30 MINUTES AFTER EVENING MEAL.    tamsulosin (FLOMAX) 0.4 mg Cap 1 capsule 30 minutes after the same meal each day    traZODone (DESYREL) 100 MG tablet 1 tablet at bedtime    traZODone (DESYREL) 100 mg, Oral, Nightly    triamterene-hydrochlorothiazide 37.5-25 mg (DYAZIDE) 37.5-25 mg per capsule 1 capsule, Oral, Every morning    triamterene-hydrochlorothiazide 37.5-25 mg (MAXZIDE-25) 37.5-25 mg per tablet TAKE 1 TABLET BY MOUTH DAILY AS NEEDED FOR FLUID        EXAM:  Constitutional  General Appearance:  There is no height or weight on file to calculate BMI., NAD  Psychiatric   Orientation: Oriented to time, oriented to place, oriented to person  Mood and Affect: Active and alert, normal mood, normal affect  Gait and Station   Appearance:  Antalgic gait/wide-based walks with a cane, unable to tandem gait, unable to walk on toes, unable to walk on heels    CERVICAL  Musculoskeletal System  Shoulder:  normal appearance, no instability, no tenderness, normal ROM right, normal ROM left, no pain with ROM    Cervical Spine  Inspection:  alignment normal, no muscle atrophy  Soft Tissue Palpation on the Right:  no tenderness of the paracervicals, no tenderness of the trapezius, no tenderness of the rhomboid  Soft Tissue Palpation on the Left:  no tenderness of the paracervicals, no tenderness of the trapezius, no tenderness of the rhomboid  Bony Palpation:  no tenderness of the occipital protuberance  Active Range:     Flexion normal, Extension normal, Rotation to the left normal, Rotation to the right normal, Lateral flexion to the left normal, Lateral flexion to the right normal   No pain elicited on motion    Motor Strength  C5 on the right:  abduction deltoid 5/5  C5 on the left:  abduction deltoid 5/5  C6 on the Right:  flexion biceps 5/5, wrist extension 5/5  C6 on the Left:  flexion biceps 5/5, wrist extension 5/5  C7 on the Right:  extension fingers 5/5,  extension triceps 5/5  C7 on the Left:  extension fingers 5/5, extension triceps 5/5  C8 on the Right:  flexion fingers 5/5  C8 on the Left:  flexion fingers 5/5  T1 on the Right:  abduction fingers 5/5  T1 on the Left:  abduction fingers 5/5    Neurological System  Sensation on the Right:  normal sensation of the extremities: right, normal median nerve distribution, normal ulnar nerve distribution  Sensation on the Left:  normal sensation of the extremities: left, normal median nerve distribution, normal ulnar nerve distribution  Biceps Reflex Right:  normal, Brachioradialis Reflex Right:  normal, Triceps Reflex Right: normal  Biceps Reflex Left:  normal, Brachioradialis Reflex Left: normal, Triceps Reflex Left:  normal  Special Test on the Right:  Spurlings test negative, Hoffmans reflex absent, no ankle clonus, Durkan test negative, Tinels sign negative at the elbow  Special Test on the Left:  Spurlings test negative, Hoffmans reflex absent, no ankle clonus, Durkan test negative, Tinels sign negative at the elbow    Skin:   Head and Neck:  normal   Right Upper Extremity:  normal   Left Upper Extremity:  normal    Cardiovascular:   Arterial Pulses Right:  radial right   Arterial Pulses Left:  radial left   Edema Right:  none   Edema Left:  None    LUMBAR  Musculoskeletal System   Hips: Normal appearance, no leg length discrepancy, normal motion; left, normal motion; right    Lumbar Spine                   Inspection:  Normal alignment, normal sagittal balance                  Range of motion:  Decreased flexion, extension, lateral bending, rotation. Pain with range of motion                  Bony Palpation of the Lumbar Spine:  No tenderness of the spinous process, no tenderness of the sacrum, no tenderness of the coccyx                  Bony Palpation of the Right Hip:  No tenderness of the iliac crest, no tenderness of the sciatic notch, no tenderness of the SI joint                  Bony Palpation of the Left  Hip:  No tenderness of the iliac crest, no tenderness of the sciatic notch, no tenderness of the SI joint                  Soft Tissue Palpation on the Right:  No tenderness of the paraspinal region, no tenderness of the iliolumbar region                  Soft Tissue Palpation on the Left:  No tenderness of the paraspinal region, no tenderness of the iliolumbar region    Motor Strength   L1 Right:  Hip flexion iliopsoas 5/5    L1 Left:  Hip flexion iliopsoas 5/5              L2-L4 Right:  Knee extension quadriceps 5/5, tibialis anterior 5/5              L2-L4 Left:  Knee extension quadriceps 5/5, tibialis anterior 5/5   L5 Right:  Extensor hallucis llongus 5/5,    L5 Left:  Extensor hallucis longus 5/5,    S1 Right:  Plantar flexion gastrocnemius 5/5   S1 Left:  Plantar flexion gastrocnemius 5/5    Neurological System   Ankle Reflex Right:  normal   Ankle Reflex Left: normal   Knee Reflex Right:  normal   Knee Reflex Left:  normal   Sensation on the Right:  L2 normal, L3 normal, L4 normal, L5 normal, S1 normal   Sensation on the Left:  L2 normal, L3 normal, L4 normal, L5 normal, S1 normal              Special Test on the Right:  Seated straight leg raising test negative, no clonus of the ankle              Special Test on the Left:  Seated straight leg raising test negative, no clonus of the ankle    Skin   Lumbosacral Spine:  Normal skin    Cardiovascular System   Arterial Pulses Right:  Posterior tibialis normal, dorsalis pedis normal   Arterial Pulses Left:  Posterior tibialis normal, dorsalis pedis normal   Edema Right: None   Edema Left:  None

## 2022-03-31 RX ORDER — SODIUM CHLORIDE 9 MG/ML
INJECTION, SOLUTION INTRAVENOUS CONTINUOUS
Status: CANCELLED | OUTPATIENT
Start: 2022-03-31

## 2022-03-31 RX ORDER — MUPIROCIN 20 MG/G
OINTMENT TOPICAL
Status: CANCELLED | OUTPATIENT
Start: 2022-03-31

## 2022-04-13 ENCOUNTER — CLINICAL SUPPORT (OUTPATIENT)
Dept: CARDIOLOGY | Facility: CLINIC | Age: 77
End: 2022-04-13
Payer: MEDICARE

## 2022-04-13 ENCOUNTER — HOSPITAL ENCOUNTER (OUTPATIENT)
Dept: RADIOLOGY | Facility: HOSPITAL | Age: 77
Discharge: HOME | End: 2022-04-13
Attending: ORTHOPAEDIC SURGERY
Payer: MEDICARE

## 2022-04-13 DIAGNOSIS — Z01.818 ENCOUNTER FOR PRE-OPERATIVE EXAMINATION: ICD-10-CM

## 2022-04-13 PROCEDURE — 93010 EKG 12-LEAD: ICD-10-PCS | Mod: S$PBB,,, | Performed by: INTERNAL MEDICINE

## 2022-04-13 PROCEDURE — 71046 X-RAY EXAM CHEST 2 VIEWS: CPT | Mod: 26,,, | Performed by: RADIOLOGY

## 2022-04-13 PROCEDURE — 99212 OFFICE O/P EST SF 10 MIN: CPT | Mod: PBBFAC,25

## 2022-04-13 PROCEDURE — 71046 XR CHEST PA AND LATERAL: ICD-10-PCS | Mod: 26,,, | Performed by: RADIOLOGY

## 2022-04-13 PROCEDURE — 93010 ELECTROCARDIOGRAM REPORT: CPT | Mod: S$PBB,,, | Performed by: INTERNAL MEDICINE

## 2022-04-13 PROCEDURE — 71046 X-RAY EXAM CHEST 2 VIEWS: CPT | Mod: TC

## 2022-04-13 PROCEDURE — 93005 ELECTROCARDIOGRAM TRACING: CPT | Mod: PBBFAC | Performed by: INTERNAL MEDICINE

## 2022-04-18 ENCOUNTER — OFFICE VISIT (OUTPATIENT)
Dept: INTERNAL MEDICINE | Facility: CLINIC | Age: 77
End: 2022-04-18
Payer: MEDICARE

## 2022-04-18 VITALS
SYSTOLIC BLOOD PRESSURE: 130 MMHG | OXYGEN SATURATION: 98 % | RESPIRATION RATE: 16 BRPM | DIASTOLIC BLOOD PRESSURE: 76 MMHG | WEIGHT: 235 LBS | HEIGHT: 71 IN | BODY MASS INDEX: 32.9 KG/M2 | HEART RATE: 86 BPM | TEMPERATURE: 98 F

## 2022-04-18 DIAGNOSIS — E13.51 PERIPHERAL VASCULAR DISEASE DUE TO SECONDARY DIABETES: ICD-10-CM

## 2022-04-18 DIAGNOSIS — M10.9 GOUT, UNSPECIFIED CAUSE, UNSPECIFIED CHRONICITY, UNSPECIFIED SITE: ICD-10-CM

## 2022-04-18 DIAGNOSIS — I10 ESSENTIAL HYPERTENSION: Primary | ICD-10-CM

## 2022-04-18 DIAGNOSIS — E78.5 HYPERLIPIDEMIA LDL GOAL <100: ICD-10-CM

## 2022-04-18 DIAGNOSIS — E11.9 CONTROLLED TYPE 2 DIABETES MELLITUS WITHOUT COMPLICATION, WITHOUT LONG-TERM CURRENT USE OF INSULIN: ICD-10-CM

## 2022-04-18 PROCEDURE — 99214 PR OFFICE/OUTPT VISIT, EST, LEVL IV, 30-39 MIN: ICD-10-PCS | Mod: S$PBB,,, | Performed by: INTERNAL MEDICINE

## 2022-04-18 PROCEDURE — 99214 OFFICE O/P EST MOD 30 MIN: CPT | Mod: S$PBB,,, | Performed by: INTERNAL MEDICINE

## 2022-04-18 PROCEDURE — 99215 OFFICE O/P EST HI 40 MIN: CPT | Mod: PBBFAC | Performed by: INTERNAL MEDICINE

## 2022-04-18 RX ORDER — TRAZODONE HYDROCHLORIDE 100 MG/1
50 TABLET ORAL NIGHTLY PRN
COMMUNITY
Start: 2021-08-04 | End: 2022-06-08

## 2022-04-18 RX ORDER — METRONIDAZOLE 10 MG/G
GEL TOPICAL
COMMUNITY
Start: 2021-09-26 | End: 2022-06-08

## 2022-04-18 RX ORDER — TAMSULOSIN HYDROCHLORIDE 0.4 MG/1
0.4 CAPSULE ORAL DAILY
COMMUNITY
Start: 2021-08-10 | End: 2022-06-08

## 2022-04-18 RX ORDER — GABAPENTIN 400 MG/1
400 CAPSULE ORAL 4 TIMES DAILY
COMMUNITY
Start: 2022-02-11 | End: 2022-06-08

## 2022-04-18 RX ORDER — PANTOPRAZOLE SODIUM 40 MG/1
40 TABLET, DELAYED RELEASE ORAL
COMMUNITY
Start: 2021-08-10

## 2022-04-18 RX ORDER — FAMOTIDINE 20 MG/1
20 TABLET, FILM COATED ORAL
COMMUNITY
Start: 2021-09-26 | End: 2022-06-08

## 2022-04-18 RX ORDER — GLIPIZIDE 5 MG/1
5 TABLET ORAL 2 TIMES DAILY WITH MEALS
COMMUNITY
Start: 2022-02-11

## 2022-04-18 RX ORDER — CARVEDILOL 25 MG/1
25 TABLET ORAL 2 TIMES DAILY
COMMUNITY
Start: 2022-02-11 | End: 2022-06-08

## 2022-04-18 NOTE — PROGRESS NOTES
Subjective:       Patient ID: Usama Lee is a 76 y.o. male.    Chief Complaint: preop clearance    hbp dm2 and hyperlidemiaand djd  With surg planned for 5/4 on neck with fusion procedure.  Doing well  And cleared for surg    Review of Systems   Constitutional: Negative for appetite change, fatigue and unexpected weight change.   HENT: Negative for postnasal drip, trouble swallowing and goiter.    Eyes: Negative for visual disturbance.   Respiratory: Negative for apnea, choking and chest tightness.    Cardiovascular: Negative for chest pain and leg swelling.   Gastrointestinal: Negative for blood in stool, change in bowel habit and change in bowel habit.   Genitourinary: Negative for difficulty urinating and frequency.   Musculoskeletal: Negative for arthralgias, back pain and leg pain.   Integumentary:  Negative for rash.   Neurological: Negative for disturbances in coordination, memory loss and coordination difficulties.   Hematological: Negative for adenopathy.   Psychiatric/Behavioral: Negative for agitation. The patient is not hyperactive.          Objective:      Physical Exam  Vitals and nursing note reviewed.   Constitutional:       Appearance: Normal appearance. He is obese.   HENT:      Head: Normocephalic and atraumatic.      Right Ear: Tympanic membrane, ear canal and external ear normal.      Left Ear: Tympanic membrane, ear canal and external ear normal.      Nose: Nose normal.      Mouth/Throat:      Mouth: Mucous membranes are moist.      Pharynx: Oropharynx is clear.   Eyes:      Extraocular Movements: Extraocular movements intact.      Conjunctiva/sclera: Conjunctivae normal.      Pupils: Pupils are equal, round, and reactive to light.   Cardiovascular:      Rate and Rhythm: Normal rate and regular rhythm.      Pulses: Normal pulses.      Heart sounds: Normal heart sounds.   Pulmonary:      Effort: Pulmonary effort is normal.      Breath sounds: Normal breath sounds.   Abdominal:       General: Abdomen is flat. Bowel sounds are normal.      Palpations: Abdomen is soft.   Musculoskeletal:         General: Normal range of motion.      Cervical back: Normal range of motion.   Skin:     General: Skin is warm and dry.      Capillary Refill: Capillary refill takes less than 2 seconds.   Neurological:      General: No focal deficit present.      Mental Status: He is alert and oriented to person, place, and time.   Psychiatric:         Mood and Affect: Mood normal.         Behavior: Behavior normal.         Thought Content: Thought content normal.         Judgment: Judgment normal.         Assessment:       1. Essential hypertension    2. Hyperlipidemia LDL goal <100    3. Controlled type 2 diabetes mellitus without complication, without long-term current use of insulin    4. Gout, unspecified cause, unspecified chronicity, unspecified site    5. Peripheral vascular disease due to secondary diabetes        Plan:         Patient Instructions   Pt is surgically cleared for neck surg  by dr. martinez        Problem List Items Addressed This Visit    None     Visit Diagnoses     Essential hypertension    -  Primary    Hyperlipidemia LDL goal <100        Controlled type 2 diabetes mellitus without complication, without long-term current use of insulin        Relevant Medications    glipiZIDE (GLUCOTROL) 5 MG tablet    Gout, unspecified cause, unspecified chronicity, unspecified site        Peripheral vascular disease due to secondary diabetes        Relevant Medications    glipiZIDE (GLUCOTROL) 5 MG tablet

## 2022-04-27 ENCOUNTER — OFFICE VISIT (OUTPATIENT)
Dept: SPINE | Facility: CLINIC | Age: 77
DRG: 454 | End: 2022-04-27
Payer: MEDICARE

## 2022-04-27 DIAGNOSIS — Z01.818 PRE-OP EXAMINATION: Primary | ICD-10-CM

## 2022-04-27 DIAGNOSIS — M50.30 DDD (DEGENERATIVE DISC DISEASE), CERVICAL: ICD-10-CM

## 2022-04-27 DIAGNOSIS — M48.02 SPINAL STENOSIS, CERVICAL REGION: ICD-10-CM

## 2022-04-27 DIAGNOSIS — G95.9 CERVICAL MYELOPATHY: ICD-10-CM

## 2022-04-27 DIAGNOSIS — M54.16 LUMBAR RADICULOPATHY, CHRONIC: ICD-10-CM

## 2022-04-27 PROCEDURE — 99215 OFFICE O/P EST HI 40 MIN: CPT | Mod: 57,S$PBB,, | Performed by: ORTHOPAEDIC SURGERY

## 2022-04-27 PROCEDURE — 99215 PR OFFICE/OUTPT VISIT, EST, LEVL V, 40-54 MIN: ICD-10-PCS | Mod: 57,S$PBB,, | Performed by: ORTHOPAEDIC SURGERY

## 2022-04-27 PROCEDURE — 99212 OFFICE O/P EST SF 10 MIN: CPT | Mod: PBBFAC | Performed by: ORTHOPAEDIC SURGERY

## 2022-04-27 RX ORDER — OXYCODONE AND ACETAMINOPHEN 5; 325 MG/1; MG/1
1 TABLET ORAL EVERY 4 HOURS PRN
Qty: 45 TABLET | Refills: 0 | Status: SHIPPED | OUTPATIENT
Start: 2022-04-27 | End: 2022-06-08

## 2022-04-27 RX ORDER — PROMETHAZINE HYDROCHLORIDE 25 MG/1
25 TABLET ORAL EVERY 4 HOURS
Qty: 45 TABLET | Refills: 0 | Status: SHIPPED | OUTPATIENT
Start: 2022-04-27

## 2022-04-27 RX ORDER — CYCLOBENZAPRINE HCL 5 MG
5 TABLET ORAL 3 TIMES DAILY PRN
Qty: 45 TABLET | Refills: 0 | Status: SHIPPED | OUTPATIENT
Start: 2022-04-27 | End: 2022-05-07

## 2022-04-27 NOTE — PROGRESS NOTES
MDM/time:  Greater than 40 minutes spent on this encounter including 10 minutes reviewing imaging and notes, 25 minutes with the patient, 5 minutes documentation    ASSESSMENT:  76 y.o. male with cervical spondylolisthesis with myelopathy and lumbar spondylosis with radiculopathy and moderate bilateral hip arthritis    PLAN:  He would like to proceed with surgery for cervical myelopathy.  This will be a C3-C7 laminectomy and fusion     The final decision for surgery was made today during the office visit. The rationale, technique, recovery process, non-operative alternatives and potential complications associated with posterior cervical decompression and reconstruction (e.g. fusion, instrumentation or laminoplasty) were discussed with the patient in detail. The specific risks discussed included: medical/anesthetic complications, positioning palsy, infection, dural tear, epidural hematoma, wound hematoma, major vascular injury with life threatening hemorrhage or stroke, thromboembolism, nonunion, instrumentation failure or malposition, quadriplegia due to spinal cord injury, bowel/bladder/sexual dysfunction, upper extremity weakness/sensory loss due to nerve root injury, chronic neck pain/stiffness, chronic upper extremity pain, incomplete relief of preoperative symptoms and possible need for further surgery. All questions posed by the patient were answered. The surgical consent form was signed.     HPI:  76 y.o. male here for repeat evaluation of lower back pain that radiates down bilateral legs.  Denies any new injuries.  He has completed physical therapy without relief.  Reports bilateral lower extremities are worse.  It is also complaining of worsening balance and hand dysfunction.  He had 2 injections with Dr. Swain at [facility] without relief.  Patient reports history of peripheral neuropathy and relates his balance issues to this.  Patient reports decreased  strength in bilateral hands.   Difficulty with balance has had multiple falls walks with a cane.  Denies bladder incontinence difficulty walking more than 100 yd due to shortness of breath weakness in his legs.  Currently taking Neurontin 400 mg 2 tablets twice a day and Aleve as needed for pain.  History of L4-5 fusion in . Patient does not smoke.  Patient has a pacemaker Dr. Moody is his cardiologist and is on Eliquis.    IMAGIN2021 cervical spine x-ray reviewed showed:  On the AP there is normal coronal alignment.  There is uncovertebral and facet hypertrophy seen.  On the lateral there is decreased cervical lordosis.  There are spondylotic changes noted with decrease in disc height and osteophyte formation seen.  There is retrolisthesis at C3-4.    CT myelogram cervical spine 2021 reviewed shows:  At C3-4 there is moderate central severe bilateral foraminal stenosis  At C4-5 there is moderate central severe bilateral foraminal stenosis  At C5-6 there is moderate central stenosis  At C6-7 there is severe central and bilateral foraminal stenosis    2021 lumbar spine x-ray reviewed showed:  On the AP there is normal coronal alignment.  There are 5 non-rib-bearing lumbar vertebrae.  On the lateral there is decreased lumbar lordosis.  There is spondylotic disease with decreased disc height and osteophyte formation noted.  There is apparent fusion at L4-5.  No fractures or listhesis noted.  No instability on flexion-extension views.  Moderate bilateral hip arthritis.    CT myelogram 2021 lumbar spine reviewed shows:  At L1-2 there is moderate bilateral lateral recess stenosis and left greater than right foraminal stenosis  At L2-3 there is moderate central and severe bilateral lateral recess stenosis  At L3-4 there is severe central bilateral lateral recess stenosis and severe bilateral foraminal stenosis  At L4-5 there is prior fusion with severe central bilateral lateral recess stenosis and severe bilateral  foraminal stenosis  At L5-S1 there is severe bilateral foraminal stenosis  Past Medical History:   Diagnosis Date    Diabetes     Gout     Heart disease     High cholesterol     HTN (hypertension)     Parkinson disease      Past Surgical History:   Procedure Laterality Date    FLEXOR TENDON REPAIR Left 8/19/2021    Procedure: REPAIR, TENDON, FLEXOR;  Surgeon: Daniel Dempsey MD;  Location: [facility];  Service: Orthopedics;  Laterality: Left;    TOTAL KNEE ARTHROPLASTY Right     TRIGGER FINGER RELEASE Left 8/19/2021    Procedure: RELEASE, TRIGGER FINGER,RING;  Surgeon: Daniel Dempsey MD;  Location: [facility];  Service: Orthopedics;  Laterality: Left;     Social History     Tobacco Use    Smoking status: Never Smoker    Smokeless tobacco: Never Used   Substance Use Topics    Alcohol use: Not Currently      Current Outpatient Medications   Medication Instructions    allopurinoL (ZYLOPRIM) 300 MG tablet TAKE 1 TABLET(S) BY MOUTH DAILY    apixaban (ELIQUIS) 5 mg    aspirin (ECOTRIN) 81 MG EC tablet 1 tablet, Oral, Daily    atorvastatin (LIPITOR) 80 MG tablet TAKE ONE-HALF TABLET BY MOUTH DAILY FOR CHOLESTEROL. STOP MEDICATION AND CALL PROVIDER FOR ANY UNEXPLAINED MUSCLE ACHES,PAIN OR WEAKNESS    benztropine (COGENTIN) 1 MG tablet 1 tablet, Oral, Nightly    carbidopa-levodopa  mg (SINEMET)  mg per tablet Oral    carbidopa-levodopa  mg (SINEMET)  mg per tablet 1 tablet    carvediloL (COREG) 25 mg    cyclobenzaprine (FLEXERIL) 5 mg, Oral, 3 times daily PRN    doxycycline (MONODOX) 100 MG capsule TAKE ONE CAPSULE BY MOUTH DAILY WITH FOOD    famotidine (PEPCID) 20 mg    ferrous sulfate (FEOSOL) 325 mg (65 mg iron) Tab tablet Oral    fluticasone propionate (FLONASE) 50 mcg/actuation nasal spray INSTILL 1 SPRAY IN NOSTRIL(S) DAILY    gabapentin (NEURONTIN) 400 mg    glipiZIDE (GLUCOTROL) 5 mg    isosorbide mononitrate (IMDUR) 30 MG 24 hr tablet 1  tablet, Oral, Daily    metronidazole 1% (METROGEL) 1 % Gel APPLY SMALL AMOUNT TO AFFECTED AREA(S) THREE TIMES A DAY    nicotine polacrilex 2 MG Lozg DISSOLVE 1 LOZENGE IN MOUTH EVERY 2 HOURS AS NEEDED FOR SMOKING CESSATION    nitroGLYCERIN (NITROSTAT) 0.4 MG SL tablet DISSOLVE ONE TABLET UNDER THE TONGUE EVERY 5 MINUTES AS NEEDED    oxyCODONE-acetaminophen (PERCOCET) 5-325 mg per tablet 1 tablet, Oral, Every 4 hours PRN    pantoprazole (PROTONIX) 40 mg    promethazine (PHENERGAN) 25 mg, Oral, Every 4 hours    tadalafiL (CIALIS) 20 MG Tab TAKE 1/2 TABLET BY MOUTH ONE hour BEFORE sex    tamsulosin (FLOMAX) 0.4 mg    traZODone (DESYREL) 150 mg    triamterene-hydrochlorothiazide 37.5-25 mg (DYAZIDE) 37.5-25 mg per capsule 1 capsule, Oral, Every morning        EXAM:  Constitutional  General Appearance:  There is no height or weight on file to calculate BMI., NAD  Psychiatric   Orientation: Oriented to time, oriented to place, oriented to person  Mood and Affect: Active and alert, normal mood, normal affect  Gait and Station   Appearance:  Antalgic gait/wide-based walks with a cane, unable to tandem gait, unable to walk on toes, unable to walk on heels    CERVICAL  Musculoskeletal System  Shoulder:  normal appearance, no instability, no tenderness, normal ROM right, normal ROM left, no pain with ROM    Cervical Spine  Inspection:  alignment normal, no muscle atrophy  Soft Tissue Palpation on the Right:  no tenderness of the paracervicals, no tenderness of the trapezius, no tenderness of the rhomboid  Soft Tissue Palpation on the Left:  no tenderness of the paracervicals, no tenderness of the trapezius, no tenderness of the rhomboid  Bony Palpation:  no tenderness of the occipital protuberance  Active Range:     Flexion normal, Extension normal, Rotation to the left normal, Rotation to the right normal, Lateral flexion to the left normal, Lateral flexion to the right normal   No pain elicited on motion    Motor  Strength  C5 on the right:  abduction deltoid 5/5  C5 on the left:  abduction deltoid 5/5  C6 on the Right:  flexion biceps 5/5, wrist extension 5/5  C6 on the Left:  flexion biceps 5/5, wrist extension 5/5  C7 on the Right:  extension fingers 5/5, extension triceps 5/5  C7 on the Left:  extension fingers 5/5, extension triceps 5/5  C8 on the Right:  flexion fingers 5/5  C8 on the Left:  flexion fingers 5/5  T1 on the Right:  abduction fingers 5/5  T1 on the Left:  abduction fingers 5/5    Neurological System  Sensation on the Right:  normal sensation of the extremities: right, normal median nerve distribution, normal ulnar nerve distribution  Sensation on the Left:  normal sensation of the extremities: left, normal median nerve distribution, normal ulnar nerve distribution  Biceps Reflex Right:  normal, Brachioradialis Reflex Right:  normal, Triceps Reflex Right: normal  Biceps Reflex Left:  normal, Brachioradialis Reflex Left: normal, Triceps Reflex Left:  normal  Special Test on the Right:  Spurlings test negative, Hoffmans reflex absent, no ankle clonus, Durkan test negative, Tinels sign negative at the elbow  Special Test on the Left:  Spurlings test negative, Hoffmans reflex absent, no ankle clonus, Durkan test negative, Tinels sign negative at the elbow    Skin:   Head and Neck:  normal   Right Upper Extremity:  normal   Left Upper Extremity:  normal    Cardiovascular:   Arterial Pulses Right:  radial right   Arterial Pulses Left:  radial left   Edema Right:  none   Edema Left:  None    LUMBAR  Musculoskeletal System   Hips: Normal appearance, no leg length discrepancy, normal motion; left, normal motion; right    Lumbar Spine                   Inspection:  Normal alignment, normal sagittal balance                  Range of motion:  Decreased flexion, extension, lateral bending, rotation. Pain with range of motion                  Bony Palpation of the Lumbar Spine:  No tenderness of the spinous process, no  tenderness of the sacrum, no tenderness of the coccyx                  Bony Palpation of the Right Hip:  No tenderness of the iliac crest, no tenderness of the sciatic notch, no tenderness of the SI joint                  Bony Palpation of the Left Hip:  No tenderness of the iliac crest, no tenderness of the sciatic notch, no tenderness of the SI joint                  Soft Tissue Palpation on the Right:  No tenderness of the paraspinal region, no tenderness of the iliolumbar region                  Soft Tissue Palpation on the Left:  No tenderness of the paraspinal region, no tenderness of the iliolumbar region    Motor Strength   L1 Right:  Hip flexion iliopsoas 5/5    L1 Left:  Hip flexion iliopsoas 5/5              L2-L4 Right:  Knee extension quadriceps 5/5, tibialis anterior 5/5              L2-L4 Left:  Knee extension quadriceps 5/5, tibialis anterior 5/5   L5 Right:  Extensor hallucis llongus 5/5,    L5 Left:  Extensor hallucis longus 5/5,    S1 Right:  Plantar flexion gastrocnemius 5/5   S1 Left:  Plantar flexion gastrocnemius 5/5    Neurological System   Ankle Reflex Right:  normal   Ankle Reflex Left: normal   Knee Reflex Right:  normal   Knee Reflex Left:  normal   Sensation on the Right:  L2 normal, L3 normal, L4 normal, L5 normal, S1 normal   Sensation on the Left:  L2 normal, L3 normal, L4 normal, L5 normal, S1 normal              Special Test on the Right:  Seated straight leg raising test negative, no clonus of the ankle              Special Test on the Left:  Seated straight leg raising test negative, no clonus of the ankle    Skin   Lumbosacral Spine:  Normal skin    Cardiovascular System   Arterial Pulses Right:  Posterior tibialis normal, dorsalis pedis normal   Arterial Pulses Left:  Posterior tibialis normal, dorsalis pedis normal   Edema Right: None   Edema Left:  None

## 2022-04-27 NOTE — PATIENT INSTRUCTIONS
Arrive to the ground floor of the ambulatory surgery building at 5:30  Do not eat or drink after midnight (this includes, gum, candy, chewing tobacco etc.)  Bring all medication in the original bottles.  Bring anything you may need for an overnight stay.  Bathe with Hibiclens the night or morning before your surgery.  The morning of you surgery only take blood pressure medications and thyroid medications (if you are on any, that you take in the AM).  Be sure that you have stopped blood thinners at appropriate time, as instructed.      [email or web address]

## 2022-04-27 NOTE — H&P (VIEW-ONLY)
MDM/time:  Greater than 40 minutes spent on this encounter including 10 minutes reviewing imaging and notes, 25 minutes with the patient, 5 minutes documentation    ASSESSMENT:  76 y.o. male with cervical spondylolisthesis with myelopathy and lumbar spondylosis with radiculopathy and moderate bilateral hip arthritis    PLAN:  He would like to proceed with surgery for cervical myelopathy.  This will be a C3-C7 laminectomy and fusion     The final decision for surgery was made today during the office visit. The rationale, technique, recovery process, non-operative alternatives and potential complications associated with posterior cervical decompression and reconstruction (e.g. fusion, instrumentation or laminoplasty) were discussed with the patient in detail. The specific risks discussed included: medical/anesthetic complications, positioning palsy, infection, dural tear, epidural hematoma, wound hematoma, major vascular injury with life threatening hemorrhage or stroke, thromboembolism, nonunion, instrumentation failure or malposition, quadriplegia due to spinal cord injury, bowel/bladder/sexual dysfunction, upper extremity weakness/sensory loss due to nerve root injury, chronic neck pain/stiffness, chronic upper extremity pain, incomplete relief of preoperative symptoms and possible need for further surgery. All questions posed by the patient were answered. The surgical consent form was signed.     HPI:  76 y.o. male here for repeat evaluation of lower back pain that radiates down bilateral legs.  Denies any new injuries.  He has completed physical therapy without relief.  Reports bilateral lower extremities are worse.  It is also complaining of worsening balance and hand dysfunction.  He had 2 injections with Dr. Swain at [facility] without relief.  Patient reports history of peripheral neuropathy and relates his balance issues to this.  Patient reports decreased  strength in bilateral hands.   Difficulty with balance has had multiple falls walks with a cane.  Denies bladder incontinence difficulty walking more than 100 yd due to shortness of breath weakness in his legs.  Currently taking Neurontin 400 mg 2 tablets twice a day and Aleve as needed for pain.  History of L4-5 fusion in . Patient does not smoke.  Patient has a pacemaker Dr. Moody is his cardiologist and is on Eliquis.    IMAGIN2021 cervical spine x-ray reviewed showed:  On the AP there is normal coronal alignment.  There is uncovertebral and facet hypertrophy seen.  On the lateral there is decreased cervical lordosis.  There are spondylotic changes noted with decrease in disc height and osteophyte formation seen.  There is retrolisthesis at C3-4.    CT myelogram cervical spine 2021 reviewed shows:  At C3-4 there is moderate central severe bilateral foraminal stenosis  At C4-5 there is moderate central severe bilateral foraminal stenosis  At C5-6 there is moderate central stenosis  At C6-7 there is severe central and bilateral foraminal stenosis    2021 lumbar spine x-ray reviewed showed:  On the AP there is normal coronal alignment.  There are 5 non-rib-bearing lumbar vertebrae.  On the lateral there is decreased lumbar lordosis.  There is spondylotic disease with decreased disc height and osteophyte formation noted.  There is apparent fusion at L4-5.  No fractures or listhesis noted.  No instability on flexion-extension views.  Moderate bilateral hip arthritis.    CT myelogram 2021 lumbar spine reviewed shows:  At L1-2 there is moderate bilateral lateral recess stenosis and left greater than right foraminal stenosis  At L2-3 there is moderate central and severe bilateral lateral recess stenosis  At L3-4 there is severe central bilateral lateral recess stenosis and severe bilateral foraminal stenosis  At L4-5 there is prior fusion with severe central bilateral lateral recess stenosis and severe bilateral  foraminal stenosis  At L5-S1 there is severe bilateral foraminal stenosis  Past Medical History:   Diagnosis Date    Diabetes     Gout     Heart disease     High cholesterol     HTN (hypertension)     Parkinson disease      Past Surgical History:   Procedure Laterality Date    FLEXOR TENDON REPAIR Left 8/19/2021    Procedure: REPAIR, TENDON, FLEXOR;  Surgeon: Daniel Dempsey MD;  Location: [facility];  Service: Orthopedics;  Laterality: Left;    TOTAL KNEE ARTHROPLASTY Right     TRIGGER FINGER RELEASE Left 8/19/2021    Procedure: RELEASE, TRIGGER FINGER,RING;  Surgeon: Daniel Dempsey MD;  Location: [facility];  Service: Orthopedics;  Laterality: Left;     Social History     Tobacco Use    Smoking status: Never Smoker    Smokeless tobacco: Never Used   Substance Use Topics    Alcohol use: Not Currently      Current Outpatient Medications   Medication Instructions    allopurinoL (ZYLOPRIM) 300 MG tablet TAKE 1 TABLET(S) BY MOUTH DAILY    apixaban (ELIQUIS) 5 mg    aspirin (ECOTRIN) 81 MG EC tablet 1 tablet, Oral, Daily    atorvastatin (LIPITOR) 80 MG tablet TAKE ONE-HALF TABLET BY MOUTH DAILY FOR CHOLESTEROL. STOP MEDICATION AND CALL PROVIDER FOR ANY UNEXPLAINED MUSCLE ACHES,PAIN OR WEAKNESS    benztropine (COGENTIN) 1 MG tablet 1 tablet, Oral, Nightly    carbidopa-levodopa  mg (SINEMET)  mg per tablet Oral    carbidopa-levodopa  mg (SINEMET)  mg per tablet 1 tablet    carvediloL (COREG) 25 mg    cyclobenzaprine (FLEXERIL) 5 mg, Oral, 3 times daily PRN    doxycycline (MONODOX) 100 MG capsule TAKE ONE CAPSULE BY MOUTH DAILY WITH FOOD    famotidine (PEPCID) 20 mg    ferrous sulfate (FEOSOL) 325 mg (65 mg iron) Tab tablet Oral    fluticasone propionate (FLONASE) 50 mcg/actuation nasal spray INSTILL 1 SPRAY IN NOSTRIL(S) DAILY    gabapentin (NEURONTIN) 400 mg    glipiZIDE (GLUCOTROL) 5 mg    isosorbide mononitrate (IMDUR) 30 MG 24 hr tablet 1  tablet, Oral, Daily    metronidazole 1% (METROGEL) 1 % Gel APPLY SMALL AMOUNT TO AFFECTED AREA(S) THREE TIMES A DAY    nicotine polacrilex 2 MG Lozg DISSOLVE 1 LOZENGE IN MOUTH EVERY 2 HOURS AS NEEDED FOR SMOKING CESSATION    nitroGLYCERIN (NITROSTAT) 0.4 MG SL tablet DISSOLVE ONE TABLET UNDER THE TONGUE EVERY 5 MINUTES AS NEEDED    oxyCODONE-acetaminophen (PERCOCET) 5-325 mg per tablet 1 tablet, Oral, Every 4 hours PRN    pantoprazole (PROTONIX) 40 mg    promethazine (PHENERGAN) 25 mg, Oral, Every 4 hours    tadalafiL (CIALIS) 20 MG Tab TAKE 1/2 TABLET BY MOUTH ONE hour BEFORE sex    tamsulosin (FLOMAX) 0.4 mg    traZODone (DESYREL) 150 mg    triamterene-hydrochlorothiazide 37.5-25 mg (DYAZIDE) 37.5-25 mg per capsule 1 capsule, Oral, Every morning        EXAM:  Constitutional  General Appearance:  There is no height or weight on file to calculate BMI., NAD  Psychiatric   Orientation: Oriented to time, oriented to place, oriented to person  Mood and Affect: Active and alert, normal mood, normal affect  Gait and Station   Appearance:  Antalgic gait/wide-based walks with a cane, unable to tandem gait, unable to walk on toes, unable to walk on heels    CERVICAL  Musculoskeletal System  Shoulder:  normal appearance, no instability, no tenderness, normal ROM right, normal ROM left, no pain with ROM    Cervical Spine  Inspection:  alignment normal, no muscle atrophy  Soft Tissue Palpation on the Right:  no tenderness of the paracervicals, no tenderness of the trapezius, no tenderness of the rhomboid  Soft Tissue Palpation on the Left:  no tenderness of the paracervicals, no tenderness of the trapezius, no tenderness of the rhomboid  Bony Palpation:  no tenderness of the occipital protuberance  Active Range:     Flexion normal, Extension normal, Rotation to the left normal, Rotation to the right normal, Lateral flexion to the left normal, Lateral flexion to the right normal   No pain elicited on motion    Motor  Strength  C5 on the right:  abduction deltoid 5/5  C5 on the left:  abduction deltoid 5/5  C6 on the Right:  flexion biceps 5/5, wrist extension 5/5  C6 on the Left:  flexion biceps 5/5, wrist extension 5/5  C7 on the Right:  extension fingers 5/5, extension triceps 5/5  C7 on the Left:  extension fingers 5/5, extension triceps 5/5  C8 on the Right:  flexion fingers 5/5  C8 on the Left:  flexion fingers 5/5  T1 on the Right:  abduction fingers 5/5  T1 on the Left:  abduction fingers 5/5    Neurological System  Sensation on the Right:  normal sensation of the extremities: right, normal median nerve distribution, normal ulnar nerve distribution  Sensation on the Left:  normal sensation of the extremities: left, normal median nerve distribution, normal ulnar nerve distribution  Biceps Reflex Right:  normal, Brachioradialis Reflex Right:  normal, Triceps Reflex Right: normal  Biceps Reflex Left:  normal, Brachioradialis Reflex Left: normal, Triceps Reflex Left:  normal  Special Test on the Right:  Spurlings test negative, Hoffmans reflex absent, no ankle clonus, Durkan test negative, Tinels sign negative at the elbow  Special Test on the Left:  Spurlings test negative, Hoffmans reflex absent, no ankle clonus, Durkan test negative, Tinels sign negative at the elbow    Skin:   Head and Neck:  normal   Right Upper Extremity:  normal   Left Upper Extremity:  normal    Cardiovascular:   Arterial Pulses Right:  radial right   Arterial Pulses Left:  radial left   Edema Right:  none   Edema Left:  None    LUMBAR  Musculoskeletal System   Hips: Normal appearance, no leg length discrepancy, normal motion; left, normal motion; right    Lumbar Spine                   Inspection:  Normal alignment, normal sagittal balance                  Range of motion:  Decreased flexion, extension, lateral bending, rotation. Pain with range of motion                  Bony Palpation of the Lumbar Spine:  No tenderness of the spinous process, no  tenderness of the sacrum, no tenderness of the coccyx                  Bony Palpation of the Right Hip:  No tenderness of the iliac crest, no tenderness of the sciatic notch, no tenderness of the SI joint                  Bony Palpation of the Left Hip:  No tenderness of the iliac crest, no tenderness of the sciatic notch, no tenderness of the SI joint                  Soft Tissue Palpation on the Right:  No tenderness of the paraspinal region, no tenderness of the iliolumbar region                  Soft Tissue Palpation on the Left:  No tenderness of the paraspinal region, no tenderness of the iliolumbar region    Motor Strength   L1 Right:  Hip flexion iliopsoas 5/5    L1 Left:  Hip flexion iliopsoas 5/5              L2-L4 Right:  Knee extension quadriceps 5/5, tibialis anterior 5/5              L2-L4 Left:  Knee extension quadriceps 5/5, tibialis anterior 5/5   L5 Right:  Extensor hallucis llongus 5/5,    L5 Left:  Extensor hallucis longus 5/5,    S1 Right:  Plantar flexion gastrocnemius 5/5   S1 Left:  Plantar flexion gastrocnemius 5/5    Neurological System   Ankle Reflex Right:  normal   Ankle Reflex Left: normal   Knee Reflex Right:  normal   Knee Reflex Left:  normal   Sensation on the Right:  L2 normal, L3 normal, L4 normal, L5 normal, S1 normal   Sensation on the Left:  L2 normal, L3 normal, L4 normal, L5 normal, S1 normal              Special Test on the Right:  Seated straight leg raising test negative, no clonus of the ankle              Special Test on the Left:  Seated straight leg raising test negative, no clonus of the ankle    Skin   Lumbosacral Spine:  Normal skin    Cardiovascular System   Arterial Pulses Right:  Posterior tibialis normal, dorsalis pedis normal   Arterial Pulses Left:  Posterior tibialis normal, dorsalis pedis normal   Edema Right: None   Edema Left:  None

## 2022-04-28 ENCOUNTER — ANESTHESIA EVENT (OUTPATIENT)
Dept: SURGERY | Facility: HOSPITAL | Age: 77
DRG: 454 | End: 2022-04-28
Payer: MEDICARE

## 2022-04-28 ENCOUNTER — ANESTHESIA (OUTPATIENT)
Dept: SURGERY | Facility: HOSPITAL | Age: 77
DRG: 454 | End: 2022-04-28
Payer: MEDICARE

## 2022-04-28 ENCOUNTER — HOSPITAL ENCOUNTER (INPATIENT)
Facility: HOSPITAL | Age: 77
LOS: 1 days | Discharge: HOME | DRG: 454 | End: 2022-04-29
Attending: ORTHOPAEDIC SURGERY | Admitting: ORTHOPAEDIC SURGERY
Payer: MEDICARE

## 2022-04-28 VITALS
OXYGEN SATURATION: 100 % | SYSTOLIC BLOOD PRESSURE: 89 MMHG | RESPIRATION RATE: 7 BRPM | HEART RATE: 82 BPM | DIASTOLIC BLOOD PRESSURE: 54 MMHG

## 2022-04-28 DIAGNOSIS — M48.02 SPINAL STENOSIS, CERVICAL REGION: ICD-10-CM

## 2022-04-28 DIAGNOSIS — M54.16 LUMBAR RADICULOPATHY, CHRONIC: ICD-10-CM

## 2022-04-28 DIAGNOSIS — G95.9 CERVICAL MYELOPATHY: ICD-10-CM

## 2022-04-28 PROBLEM — E11.9 DM (DIABETES MELLITUS): Chronic | Status: ACTIVE | Noted: 2022-04-28

## 2022-04-28 PROBLEM — I10 HTN (HYPERTENSION): Chronic | Status: ACTIVE | Noted: 2022-04-28

## 2022-04-28 PROBLEM — G20.A1 PARKINSON DISEASE: Chronic | Status: ACTIVE | Noted: 2022-04-28

## 2022-04-28 PROBLEM — E78.5 HLD (HYPERLIPIDEMIA): Chronic | Status: ACTIVE | Noted: 2022-04-28

## 2022-04-28 LAB
GLUCOSE SERPL-MCNC: 119 MG/DL (ref 70–105)
GLUCOSE SERPL-MCNC: 169 MG/DL (ref 70–105)
GLUCOSE SERPL-MCNC: 255 MG/DL (ref 70–105)

## 2022-04-28 PROCEDURE — 63600175 PHARM REV CODE 636 W HCPCS: Performed by: NURSE ANESTHETIST, CERTIFIED REGISTERED

## 2022-04-28 PROCEDURE — 25000003 PHARM REV CODE 250: Performed by: ORTHOPAEDIC SURGERY

## 2022-04-28 PROCEDURE — 82962 GLUCOSE BLOOD TEST: CPT

## 2022-04-28 PROCEDURE — 20936 PR AUTOGRAFT SPINE SURGERY LOCAL FROM SAME INCISION: ICD-10-PCS | Mod: ,,, | Performed by: ORTHOPAEDIC SURGERY

## 2022-04-28 PROCEDURE — 97161 PT EVAL LOW COMPLEX 20 MIN: CPT

## 2022-04-28 PROCEDURE — 27000165 HC TUBE, ETT CUFFED: Performed by: NURSE ANESTHETIST, CERTIFIED REGISTERED

## 2022-04-28 PROCEDURE — D9220A PRA ANESTHESIA: ICD-10-PCS | Mod: CRNA,,, | Performed by: NURSE ANESTHETIST, CERTIFIED REGISTERED

## 2022-04-28 PROCEDURE — 22842 PR POSTERIOR SEGMENTAL INSTRUMENTATION 3-6 VRT SEG: ICD-10-PCS | Mod: ,,, | Performed by: ORTHOPAEDIC SURGERY

## 2022-04-28 PROCEDURE — 63048 LAM FACETEC &FORAMOT EA ADDL: CPT | Mod: ,,, | Performed by: ORTHOPAEDIC SURGERY

## 2022-04-28 PROCEDURE — 22842 INSERT SPINE FIXATION DEVICE: CPT | Mod: ,,, | Performed by: ORTHOPAEDIC SURGERY

## 2022-04-28 PROCEDURE — 99223 PR INITIAL HOSPITAL CARE,LEVL III: ICD-10-PCS | Mod: ,,, | Performed by: HOSPITALIST

## 2022-04-28 PROCEDURE — 99223 1ST HOSP IP/OBS HIGH 75: CPT | Mod: ,,, | Performed by: HOSPITALIST

## 2022-04-28 PROCEDURE — 27000510 HC BLANKET BAIR HUGGER ANY SIZE: Performed by: NURSE ANESTHETIST, CERTIFIED REGISTERED

## 2022-04-28 PROCEDURE — D9220A PRA ANESTHESIA: Mod: ANES,,, | Performed by: ANESTHESIOLOGY

## 2022-04-28 PROCEDURE — 63600175 PHARM REV CODE 636 W HCPCS: Performed by: ORTHOPAEDIC SURGERY

## 2022-04-28 PROCEDURE — 25000003 PHARM REV CODE 250: Performed by: NURSE ANESTHETIST, CERTIFIED REGISTERED

## 2022-04-28 PROCEDURE — 27000260 *HC AIRWAY ORAL: Performed by: NURSE ANESTHETIST, CERTIFIED REGISTERED

## 2022-04-28 PROCEDURE — 63045 LAM FACETEC & FORAMOT CRV: CPT | Mod: ,,, | Performed by: ORTHOPAEDIC SURGERY

## 2022-04-28 PROCEDURE — 11000001 HC ACUTE MED/SURG PRIVATE ROOM

## 2022-04-28 PROCEDURE — 80048 BASIC METABOLIC PNL TOTAL CA: CPT | Performed by: REGISTERED NURSE

## 2022-04-28 PROCEDURE — 22614 PR ARTHRODESIS, POST/POSTLAT, SNGL INTERSPACE, EA ADDTL: ICD-10-PCS | Mod: ,,, | Performed by: ORTHOPAEDIC SURGERY

## 2022-04-28 PROCEDURE — 36000712 HC OR TIME LEV V 1ST 15 MIN: Performed by: ORTHOPAEDIC SURGERY

## 2022-04-28 PROCEDURE — 27000655: Performed by: NURSE ANESTHETIST, CERTIFIED REGISTERED

## 2022-04-28 PROCEDURE — 22614 ARTHRD PST TQ 1NTRSPC EA ADD: CPT | Mod: ,,, | Performed by: ORTHOPAEDIC SURGERY

## 2022-04-28 PROCEDURE — 20930 PR ALLOGRAFT FOR SPINE SURGERY ONLY MORSELIZED: ICD-10-PCS | Mod: ,,, | Performed by: ORTHOPAEDIC SURGERY

## 2022-04-28 PROCEDURE — 71000015 HC POSTOP RECOV 1ST HR: Performed by: ORTHOPAEDIC SURGERY

## 2022-04-28 PROCEDURE — 37000009 HC ANESTHESIA EA ADD 15 MINS: Performed by: ORTHOPAEDIC SURGERY

## 2022-04-28 PROCEDURE — 37000008 HC ANESTHESIA 1ST 15 MINUTES: Performed by: ORTHOPAEDIC SURGERY

## 2022-04-28 PROCEDURE — 20936 SP BONE AGRFT LOCAL ADD-ON: CPT | Mod: ,,, | Performed by: ORTHOPAEDIC SURGERY

## 2022-04-28 PROCEDURE — 71000016 HC POSTOP RECOV ADDL HR: Performed by: ORTHOPAEDIC SURGERY

## 2022-04-28 PROCEDURE — 27100025 HC TUBING, SET FLUID WARMER: Performed by: ANESTHESIOLOGY

## 2022-04-28 PROCEDURE — 27000689 HC BLADE LARYNGOSCOPE ANY SIZE: Performed by: NURSE ANESTHETIST, CERTIFIED REGISTERED

## 2022-04-28 PROCEDURE — D9220A PRA ANESTHESIA: Mod: CRNA,,, | Performed by: NURSE ANESTHETIST, CERTIFIED REGISTERED

## 2022-04-28 PROCEDURE — C1713 ANCHOR/SCREW BN/BN,TIS/BN: HCPCS | Performed by: ORTHOPAEDIC SURGERY

## 2022-04-28 PROCEDURE — 22600 ARTHRD PST TQ 1NTRSPC CRV: CPT | Mod: 51,,, | Performed by: ORTHOPAEDIC SURGERY

## 2022-04-28 PROCEDURE — 20930 SP BONE ALGRFT MORSEL ADD-ON: CPT | Mod: ,,, | Performed by: ORTHOPAEDIC SURGERY

## 2022-04-28 PROCEDURE — 36415 COLL VENOUS BLD VENIPUNCTURE: CPT | Performed by: REGISTERED NURSE

## 2022-04-28 PROCEDURE — 36000713 HC OR TIME LEV V EA ADD 15 MIN: Performed by: ORTHOPAEDIC SURGERY

## 2022-04-28 PROCEDURE — 63048 PR LAMINECT/FACETECT/FORAMINOT, EA ADDTL VERTEBRAL SEGM: ICD-10-PCS | Mod: ,,, | Performed by: ORTHOPAEDIC SURGERY

## 2022-04-28 PROCEDURE — 22600 PR ARTHRODESIS, POST/POSTLAT, SNGL INTERSPACE, CERVICAL BELOW C2: ICD-10-PCS | Mod: 51,,, | Performed by: ORTHOPAEDIC SURGERY

## 2022-04-28 PROCEDURE — 27201423 OPTIME MED/SURG SUP & DEVICES STERILE SUPPLY: Performed by: ORTHOPAEDIC SURGERY

## 2022-04-28 PROCEDURE — 71000033 HC RECOVERY, INTIAL HOUR: Performed by: ORTHOPAEDIC SURGERY

## 2022-04-28 PROCEDURE — 63045 PR LAMINEC/FACETECT/FORAMIN,CERVICAL 1 SEG: ICD-10-PCS | Mod: ,,, | Performed by: ORTHOPAEDIC SURGERY

## 2022-04-28 PROCEDURE — D9220A PRA ANESTHESIA: ICD-10-PCS | Mod: ANES,,, | Performed by: ANESTHESIOLOGY

## 2022-04-28 PROCEDURE — 27000716 HC OXISENSOR PROBE, ANY SIZE: Performed by: NURSE ANESTHETIST, CERTIFIED REGISTERED

## 2022-04-28 DEVICE — IMP SCREW SET: Type: IMPLANTABLE DEVICE | Site: NECK | Status: FUNCTIONAL

## 2022-04-28 DEVICE — IMP ROD PCF: Type: IMPLANTABLE DEVICE | Site: NECK | Status: FUNCTIONAL

## 2022-04-28 DEVICE — IMP CERVICAL POSTERIOR SPINEART SCREW 3.5 X16: Type: IMPLANTABLE DEVICE | Site: NECK | Status: FUNCTIONAL

## 2022-04-28 DEVICE — ALLOGRAFT VIABLE 5 CC: Type: IMPLANTABLE DEVICE | Site: NECK | Status: FUNCTIONAL

## 2022-04-28 RX ORDER — ROCURONIUM BROMIDE 10 MG/ML
INJECTION, SOLUTION INTRAVENOUS
Status: DISCONTINUED | OUTPATIENT
Start: 2022-04-28 | End: 2022-04-28

## 2022-04-28 RX ORDER — VANCOMYCIN HYDROCHLORIDE 1 G/20ML
INJECTION, POWDER, LYOPHILIZED, FOR SOLUTION INTRAVENOUS
Status: DISCONTINUED | OUTPATIENT
Start: 2022-04-28 | End: 2022-04-28 | Stop reason: HOSPADM

## 2022-04-28 RX ORDER — EPINEPHRINE 0.1 MG/ML
INJECTION INTRAVENOUS
Status: DISCONTINUED | OUTPATIENT
Start: 2022-04-28 | End: 2022-04-28 | Stop reason: HOSPADM

## 2022-04-28 RX ORDER — CEFAZOLIN SODIUM 2 G/50ML
2 SOLUTION INTRAVENOUS
Status: DISCONTINUED | OUTPATIENT
Start: 2022-04-28 | End: 2022-04-28

## 2022-04-28 RX ORDER — INSULIN ASPART 100 [IU]/ML
1-10 INJECTION, SOLUTION INTRAVENOUS; SUBCUTANEOUS
Status: DISCONTINUED | OUTPATIENT
Start: 2022-04-28 | End: 2022-04-29 | Stop reason: HOSPADM

## 2022-04-28 RX ORDER — MORPHINE SULFATE 8 MG/ML
2 INJECTION INTRAMUSCULAR; INTRAVENOUS; SUBCUTANEOUS
Status: DISCONTINUED | OUTPATIENT
Start: 2022-04-28 | End: 2022-04-29 | Stop reason: HOSPADM

## 2022-04-28 RX ORDER — DOCUSATE SODIUM 100 MG/1
100 CAPSULE, LIQUID FILLED ORAL 2 TIMES DAILY
Status: DISCONTINUED | OUTPATIENT
Start: 2022-04-28 | End: 2022-04-29 | Stop reason: HOSPADM

## 2022-04-28 RX ORDER — FAMOTIDINE 20 MG/1
20 TABLET, FILM COATED ORAL DAILY
Status: DISCONTINUED | OUTPATIENT
Start: 2022-04-28 | End: 2022-04-28

## 2022-04-28 RX ORDER — PANTOPRAZOLE SODIUM 40 MG/1
40 TABLET, DELAYED RELEASE ORAL DAILY
Status: DISCONTINUED | OUTPATIENT
Start: 2022-04-28 | End: 2022-04-29 | Stop reason: HOSPADM

## 2022-04-28 RX ORDER — GABAPENTIN 400 MG/1
400 CAPSULE ORAL 3 TIMES DAILY
Status: DISCONTINUED | OUTPATIENT
Start: 2022-04-28 | End: 2022-04-29 | Stop reason: HOSPADM

## 2022-04-28 RX ORDER — ACETAMINOPHEN 500 MG
500 TABLET ORAL EVERY 4 HOURS
Status: DISCONTINUED | OUTPATIENT
Start: 2022-04-28 | End: 2022-04-29 | Stop reason: HOSPADM

## 2022-04-28 RX ORDER — ONDANSETRON 2 MG/ML
4 INJECTION INTRAMUSCULAR; INTRAVENOUS EVERY 12 HOURS PRN
Status: DISCONTINUED | OUTPATIENT
Start: 2022-04-28 | End: 2022-04-29 | Stop reason: HOSPADM

## 2022-04-28 RX ORDER — OXYCODONE HCL 10 MG/1
10 TABLET, FILM COATED, EXTENDED RELEASE ORAL ONCE
Status: COMPLETED | OUTPATIENT
Start: 2022-04-28 | End: 2022-04-28

## 2022-04-28 RX ORDER — GLYCOPYRROLATE 0.2 MG/ML
INJECTION INTRAMUSCULAR; INTRAVENOUS
Status: DISCONTINUED | OUTPATIENT
Start: 2022-04-28 | End: 2022-04-28

## 2022-04-28 RX ORDER — ALLOPURINOL 300 MG/1
300 TABLET ORAL DAILY
Status: DISCONTINUED | OUTPATIENT
Start: 2022-04-28 | End: 2022-04-29 | Stop reason: HOSPADM

## 2022-04-28 RX ORDER — OXYCODONE HYDROCHLORIDE 5 MG/1
10 TABLET ORAL EVERY 4 HOURS PRN
Status: DISCONTINUED | OUTPATIENT
Start: 2022-04-28 | End: 2022-04-29 | Stop reason: HOSPADM

## 2022-04-28 RX ORDER — BENZTROPINE MESYLATE 1 MG/1
1 TABLET ORAL NIGHTLY
Status: DISCONTINUED | OUTPATIENT
Start: 2022-04-28 | End: 2022-04-29 | Stop reason: HOSPADM

## 2022-04-28 RX ORDER — NITROGLYCERIN 0.4 MG/1
0.4 TABLET SUBLINGUAL EVERY 5 MIN PRN
Status: DISCONTINUED | OUTPATIENT
Start: 2022-04-28 | End: 2022-04-29 | Stop reason: HOSPADM

## 2022-04-28 RX ORDER — LANOLIN ALCOHOL/MO/W.PET/CERES
1 CREAM (GRAM) TOPICAL DAILY
Status: DISCONTINUED | OUTPATIENT
Start: 2022-04-28 | End: 2022-04-29 | Stop reason: HOSPADM

## 2022-04-28 RX ORDER — AMOXICILLIN 250 MG
1 CAPSULE ORAL 2 TIMES DAILY
Status: DISCONTINUED | OUTPATIENT
Start: 2022-04-28 | End: 2022-04-29 | Stop reason: HOSPADM

## 2022-04-28 RX ORDER — PROPOFOL 10 MG/ML
VIAL (ML) INTRAVENOUS
Status: DISCONTINUED | OUTPATIENT
Start: 2022-04-28 | End: 2022-04-28

## 2022-04-28 RX ORDER — CARBIDOPA AND LEVODOPA 25; 100 MG/1; MG/1
1 TABLET ORAL 3 TIMES DAILY
Status: DISCONTINUED | OUTPATIENT
Start: 2022-04-28 | End: 2022-04-29 | Stop reason: HOSPADM

## 2022-04-28 RX ORDER — DIPHENHYDRAMINE HYDROCHLORIDE 50 MG/ML
25 INJECTION INTRAMUSCULAR; INTRAVENOUS EVERY 6 HOURS PRN
Status: DISCONTINUED | OUTPATIENT
Start: 2022-04-28 | End: 2022-04-28

## 2022-04-28 RX ORDER — CEFAZOLIN SODIUM 2 G/50ML
2 SOLUTION INTRAVENOUS
Status: COMPLETED | OUTPATIENT
Start: 2022-04-28 | End: 2022-04-29

## 2022-04-28 RX ORDER — FLUTICASONE PROPIONATE 50 MCG
2 SPRAY, SUSPENSION (ML) NASAL DAILY
Status: DISCONTINUED | OUTPATIENT
Start: 2022-04-29 | End: 2022-04-29 | Stop reason: HOSPADM

## 2022-04-28 RX ORDER — CYCLOBENZAPRINE HCL 5 MG
5 TABLET ORAL 3 TIMES DAILY PRN
Status: DISCONTINUED | OUTPATIENT
Start: 2022-04-28 | End: 2022-04-29 | Stop reason: HOSPADM

## 2022-04-28 RX ORDER — ONDANSETRON 2 MG/ML
4 INJECTION INTRAMUSCULAR; INTRAVENOUS DAILY PRN
Status: DISCONTINUED | OUTPATIENT
Start: 2022-04-28 | End: 2022-04-28

## 2022-04-28 RX ORDER — FAMOTIDINE 20 MG/1
20 TABLET, FILM COATED ORAL 2 TIMES DAILY
Status: DISCONTINUED | OUTPATIENT
Start: 2022-04-28 | End: 2022-04-29 | Stop reason: HOSPADM

## 2022-04-28 RX ORDER — GLUCAGON 1 MG
1 KIT INJECTION
Status: DISCONTINUED | OUTPATIENT
Start: 2022-04-28 | End: 2022-04-29 | Stop reason: HOSPADM

## 2022-04-28 RX ORDER — FENTANYL CITRATE 50 UG/ML
INJECTION, SOLUTION INTRAMUSCULAR; INTRAVENOUS
Status: DISCONTINUED | OUTPATIENT
Start: 2022-04-28 | End: 2022-04-28

## 2022-04-28 RX ORDER — TRANEXAMIC ACID 100 MG/ML
INJECTION, SOLUTION INTRAVENOUS
Status: DISCONTINUED | OUTPATIENT
Start: 2022-04-28 | End: 2022-04-28

## 2022-04-28 RX ORDER — OXYCODONE HYDROCHLORIDE 5 MG/1
5 TABLET ORAL
Status: DISCONTINUED | OUTPATIENT
Start: 2022-04-28 | End: 2022-04-28

## 2022-04-28 RX ORDER — SODIUM CHLORIDE 9 MG/ML
INJECTION, SOLUTION INTRAVENOUS CONTINUOUS
Status: DISCONTINUED | OUTPATIENT
Start: 2022-04-28 | End: 2022-04-28

## 2022-04-28 RX ORDER — CELECOXIB 100 MG/1
200 CAPSULE ORAL ONCE
Status: COMPLETED | OUTPATIENT
Start: 2022-04-28 | End: 2022-04-28

## 2022-04-28 RX ORDER — PROPOFOL 10 MG/ML
VIAL (ML) INTRAVENOUS CONTINUOUS PRN
Status: DISCONTINUED | OUTPATIENT
Start: 2022-04-28 | End: 2022-04-28

## 2022-04-28 RX ORDER — TAMSULOSIN HYDROCHLORIDE 0.4 MG/1
0.4 CAPSULE ORAL DAILY
Status: DISCONTINUED | OUTPATIENT
Start: 2022-04-28 | End: 2022-04-29 | Stop reason: HOSPADM

## 2022-04-28 RX ORDER — TRIAMTERENE/HYDROCHLOROTHIAZID 37.5-25 MG
1 TABLET ORAL DAILY
Status: DISCONTINUED | OUTPATIENT
Start: 2022-04-28 | End: 2022-04-29 | Stop reason: HOSPADM

## 2022-04-28 RX ORDER — CARVEDILOL 25 MG/1
25 TABLET ORAL 2 TIMES DAILY WITH MEALS
Status: DISCONTINUED | OUTPATIENT
Start: 2022-04-28 | End: 2022-04-29 | Stop reason: HOSPADM

## 2022-04-28 RX ORDER — MIDAZOLAM HYDROCHLORIDE 1 MG/ML
INJECTION INTRAMUSCULAR; INTRAVENOUS
Status: DISCONTINUED | OUTPATIENT
Start: 2022-04-28 | End: 2022-04-28

## 2022-04-28 RX ORDER — ONDANSETRON 2 MG/ML
INJECTION INTRAMUSCULAR; INTRAVENOUS
Status: DISCONTINUED | OUTPATIENT
Start: 2022-04-28 | End: 2022-04-28

## 2022-04-28 RX ORDER — POLYETHYLENE GLYCOL 3350 17 G/17G
17 POWDER, FOR SOLUTION ORAL DAILY
Status: DISCONTINUED | OUTPATIENT
Start: 2022-04-28 | End: 2022-04-29 | Stop reason: HOSPADM

## 2022-04-28 RX ORDER — SODIUM CHLORIDE 0.9 % (FLUSH) 0.9 %
10 SYRINGE (ML) INJECTION
Status: DISCONTINUED | OUTPATIENT
Start: 2022-04-28 | End: 2022-04-29 | Stop reason: HOSPADM

## 2022-04-28 RX ORDER — CEFAZOLIN SODIUM 1 G/3ML
INJECTION, POWDER, FOR SOLUTION INTRAMUSCULAR; INTRAVENOUS
Status: DISCONTINUED | OUTPATIENT
Start: 2022-04-28 | End: 2022-04-28

## 2022-04-28 RX ORDER — MUPIROCIN 20 MG/G
OINTMENT TOPICAL
Status: DISCONTINUED | OUTPATIENT
Start: 2022-04-28 | End: 2022-04-28

## 2022-04-28 RX ORDER — ISOSORBIDE MONONITRATE 30 MG/1
30 TABLET, EXTENDED RELEASE ORAL DAILY
Status: DISCONTINUED | OUTPATIENT
Start: 2022-04-28 | End: 2022-04-29 | Stop reason: HOSPADM

## 2022-04-28 RX ORDER — PHENYLEPHRINE HYDROCHLORIDE 10 MG/ML
INJECTION INTRAVENOUS
Status: DISCONTINUED | OUTPATIENT
Start: 2022-04-28 | End: 2022-04-28

## 2022-04-28 RX ORDER — HYDROMORPHONE HYDROCHLORIDE 2 MG/ML
0.5 INJECTION, SOLUTION INTRAMUSCULAR; INTRAVENOUS; SUBCUTANEOUS EVERY 5 MIN PRN
Status: DISCONTINUED | OUTPATIENT
Start: 2022-04-28 | End: 2022-04-28

## 2022-04-28 RX ORDER — METOCLOPRAMIDE HYDROCHLORIDE 5 MG/ML
5 INJECTION INTRAMUSCULAR; INTRAVENOUS EVERY 6 HOURS PRN
Status: DISCONTINUED | OUTPATIENT
Start: 2022-04-28 | End: 2022-04-29 | Stop reason: HOSPADM

## 2022-04-28 RX ORDER — MEPERIDINE HYDROCHLORIDE 25 MG/ML
25 INJECTION INTRAMUSCULAR; INTRAVENOUS; SUBCUTANEOUS EVERY 10 MIN PRN
Status: DISCONTINUED | OUTPATIENT
Start: 2022-04-28 | End: 2022-04-28

## 2022-04-28 RX ORDER — TRAZODONE HYDROCHLORIDE 150 MG/1
150 TABLET ORAL NIGHTLY PRN
Status: DISCONTINUED | OUTPATIENT
Start: 2022-04-28 | End: 2022-04-29 | Stop reason: HOSPADM

## 2022-04-28 RX ORDER — LIDOCAINE HYDROCHLORIDE 20 MG/ML
INJECTION, SOLUTION EPIDURAL; INFILTRATION; INTRACAUDAL; PERINEURAL
Status: DISCONTINUED | OUTPATIENT
Start: 2022-04-28 | End: 2022-04-28

## 2022-04-28 RX ORDER — KETAMINE HYDROCHLORIDE 50 MG/ML
INJECTION, SOLUTION INTRAMUSCULAR; INTRAVENOUS
Status: DISCONTINUED | OUTPATIENT
Start: 2022-04-28 | End: 2022-04-28

## 2022-04-28 RX ORDER — ATORVASTATIN CALCIUM 80 MG/1
80 TABLET, FILM COATED ORAL DAILY
Status: DISCONTINUED | OUTPATIENT
Start: 2022-04-28 | End: 2022-04-29 | Stop reason: HOSPADM

## 2022-04-28 RX ORDER — MUPIROCIN 20 MG/G
OINTMENT TOPICAL 2 TIMES DAILY
Status: DISCONTINUED | OUTPATIENT
Start: 2022-04-28 | End: 2022-04-29 | Stop reason: HOSPADM

## 2022-04-28 RX ORDER — MORPHINE SULFATE 8 MG/ML
4 INJECTION INTRAMUSCULAR; INTRAVENOUS; SUBCUTANEOUS EVERY 5 MIN PRN
Status: DISCONTINUED | OUTPATIENT
Start: 2022-04-28 | End: 2022-04-28

## 2022-04-28 RX ORDER — GABAPENTIN 300 MG/1
600 CAPSULE ORAL ONCE
Status: COMPLETED | OUTPATIENT
Start: 2022-04-28 | End: 2022-04-28

## 2022-04-28 RX ORDER — OXYCODONE HYDROCHLORIDE 5 MG/1
5 TABLET ORAL
Status: DISCONTINUED | OUTPATIENT
Start: 2022-04-28 | End: 2022-04-29 | Stop reason: HOSPADM

## 2022-04-28 RX ADMIN — FENTANYL CITRATE 50 MCG: 50 INJECTION INTRAMUSCULAR; INTRAVENOUS at 10:04

## 2022-04-28 RX ADMIN — SODIUM CHLORIDE: 9 INJECTION, SOLUTION INTRAVENOUS at 07:04

## 2022-04-28 RX ADMIN — Medication 50 MCG/KG/MIN: at 07:04

## 2022-04-28 RX ADMIN — ACETAMINOPHEN 500 MG: 500 TABLET ORAL at 01:04

## 2022-04-28 RX ADMIN — SODIUM CHLORIDE: 9 INJECTION, SOLUTION INTRAVENOUS at 06:04

## 2022-04-28 RX ADMIN — OXYCODONE HYDROCHLORIDE 10 MG: 10 TABLET, FILM COATED, EXTENDED RELEASE ORAL at 06:04

## 2022-04-28 RX ADMIN — GLYCOPYRROLATE 0.2 MG: 0.2 INJECTION INTRAMUSCULAR; INTRAVENOUS at 09:04

## 2022-04-28 RX ADMIN — GABAPENTIN 400 MG: 400 CAPSULE ORAL at 03:04

## 2022-04-28 RX ADMIN — CARVEDILOL 25 MG: 25 TABLET, FILM COATED ORAL at 05:04

## 2022-04-28 RX ADMIN — MIDAZOLAM HYDROCHLORIDE 1 MG: 1 INJECTION, SOLUTION INTRAMUSCULAR; INTRAVENOUS at 07:04

## 2022-04-28 RX ADMIN — PROPOFOL 100 MG: 10 INJECTION, EMULSION INTRAVENOUS at 07:04

## 2022-04-28 RX ADMIN — GABAPENTIN 400 MG: 400 CAPSULE ORAL at 09:04

## 2022-04-28 RX ADMIN — INSULIN ASPART 3 UNITS: 100 INJECTION, SOLUTION INTRAVENOUS; SUBCUTANEOUS at 09:04

## 2022-04-28 RX ADMIN — TRANEXAMIC ACID 500 MG: 100 INJECTION, SOLUTION INTRAVENOUS at 07:04

## 2022-04-28 RX ADMIN — TRANEXAMIC ACID 500 MG: 100 INJECTION, SOLUTION INTRAVENOUS at 09:04

## 2022-04-28 RX ADMIN — SENNOSIDES AND DOCUSATE SODIUM 1 TABLET: 50; 8.6 TABLET ORAL at 03:04

## 2022-04-28 RX ADMIN — TRIAMTERENE AND HYDROCHLOROTHIAZIDE 1 TABLET: 37.5; 25 TABLET ORAL at 03:04

## 2022-04-28 RX ADMIN — SUGAMMADEX 200 MG: 100 INJECTION, SOLUTION INTRAVENOUS at 07:04

## 2022-04-28 RX ADMIN — CEFAZOLIN 2 G: 1 INJECTION, POWDER, FOR SOLUTION INTRAMUSCULAR; INTRAVENOUS; PARENTERAL at 07:04

## 2022-04-28 RX ADMIN — OXYCODONE HYDROCHLORIDE 5 MG: 5 TABLET ORAL at 03:04

## 2022-04-28 RX ADMIN — FERROUS SULFATE TAB 325 MG (65 MG ELEMENTAL FE) 1 EACH: 325 (65 FE) TAB at 03:04

## 2022-04-28 RX ADMIN — PANTOPRAZOLE SODIUM 40 MG: 40 TABLET, DELAYED RELEASE ORAL at 03:04

## 2022-04-28 RX ADMIN — DOCUSATE SODIUM 100 MG: 100 CAPSULE, LIQUID FILLED ORAL at 09:04

## 2022-04-28 RX ADMIN — MUPIROCIN: 20 OINTMENT TOPICAL at 09:04

## 2022-04-28 RX ADMIN — MUPIROCIN: 20 OINTMENT TOPICAL at 03:04

## 2022-04-28 RX ADMIN — OXYCODONE HYDROCHLORIDE 5 MG: 5 TABLET ORAL at 01:04

## 2022-04-28 RX ADMIN — BENZTROPINE MESYLATE 1 MG: 1 TABLET ORAL at 09:04

## 2022-04-28 RX ADMIN — KETAMINE HYDROCHLORIDE 25 MG: 50 INJECTION INTRAMUSCULAR; INTRAVENOUS at 07:04

## 2022-04-28 RX ADMIN — DEXMEDETOMIDINE: 100 INJECTION, SOLUTION, CONCENTRATE INTRAVENOUS at 08:04

## 2022-04-28 RX ADMIN — ALLOPURINOL 300 MG: 300 TABLET ORAL at 03:04

## 2022-04-28 RX ADMIN — CEFAZOLIN SODIUM 2 G: 10 INJECTION, POWDER, FOR SOLUTION INTRAVENOUS at 03:04

## 2022-04-28 RX ADMIN — ACETAMINOPHEN 500 MG: 500 TABLET ORAL at 05:04

## 2022-04-28 RX ADMIN — CELECOXIB 200 MG: 100 CAPSULE ORAL at 06:04

## 2022-04-28 RX ADMIN — ATORVASTATIN CALCIUM 80 MG: 80 TABLET, FILM COATED ORAL at 03:04

## 2022-04-28 RX ADMIN — CARBIDOPA AND LEVODOPA 1 TABLET: 25; 100 TABLET ORAL at 09:04

## 2022-04-28 RX ADMIN — SENNOSIDES AND DOCUSATE SODIUM 1 TABLET: 50; 8.6 TABLET ORAL at 09:04

## 2022-04-28 RX ADMIN — OXYCODONE HYDROCHLORIDE 5 MG: 5 TABLET ORAL at 09:04

## 2022-04-28 RX ADMIN — PHENYLEPHRINE HYDROCHLORIDE 100 MCG: 10 INJECTION INTRAVENOUS at 08:04

## 2022-04-28 RX ADMIN — SODIUM CHLORIDE: 9 INJECTION, SOLUTION INTRAVENOUS at 08:04

## 2022-04-28 RX ADMIN — ISOSORBIDE MONONITRATE 30 MG: 30 TABLET, EXTENDED RELEASE ORAL at 03:04

## 2022-04-28 RX ADMIN — ROCURONIUM BROMIDE 40 MG: 10 INJECTION, SOLUTION INTRAVENOUS at 07:04

## 2022-04-28 RX ADMIN — FAMOTIDINE 20 MG: 20 TABLET ORAL at 09:04

## 2022-04-28 RX ADMIN — TRANEXAMIC ACID 500 MG: 100 INJECTION, SOLUTION INTRAVENOUS at 08:04

## 2022-04-28 RX ADMIN — ROCURONIUM BROMIDE 20 MG: 10 INJECTION, SOLUTION INTRAVENOUS at 08:04

## 2022-04-28 RX ADMIN — FAMOTIDINE 20 MG: 20 TABLET ORAL at 01:04

## 2022-04-28 RX ADMIN — KETAMINE HYDROCHLORIDE 25 MG: 50 INJECTION INTRAMUSCULAR; INTRAVENOUS at 08:04

## 2022-04-28 RX ADMIN — LIDOCAINE HYDROCHLORIDE 70 MG: 20 INJECTION, SOLUTION INTRAVENOUS at 07:04

## 2022-04-28 RX ADMIN — GABAPENTIN 600 MG: 300 CAPSULE ORAL at 06:04

## 2022-04-28 RX ADMIN — POLYETHYLENE GLYCOL 3350 17 G: 17 POWDER, FOR SOLUTION ORAL at 03:04

## 2022-04-28 RX ADMIN — CARBIDOPA AND LEVODOPA 1 TABLET: 25; 100 TABLET ORAL at 03:04

## 2022-04-28 RX ADMIN — TAMSULOSIN HYDROCHLORIDE 0.4 MG: 0.4 CAPSULE ORAL at 03:04

## 2022-04-28 RX ADMIN — ONDANSETRON 8 MG: 2 INJECTION INTRAMUSCULAR; INTRAVENOUS at 08:04

## 2022-04-28 NOTE — TRANSFER OF CARE
Anesthesia Transfer of Care Note    Patient: Usama Lee    Procedure(s) Performed: Procedure(s) (LRB):  C3-C7 Laminectomy and Fusion (N/A)    Patient location: PACU    Anesthesia Type: general    Transport from OR: Transported from OR on 6-10 L/min O2 by face mask with adequate spontaneous ventilation    Post pain: adequate analgesia    Post assessment: no apparent anesthetic complications    Post vital signs: stable    Level of consciousness: responds to stimulation and awake    Nausea/Vomiting: no nausea/vomiting    Complications: none    Transfer of care protocol was followedComments: Good SV continue, NAD noted, VSS, RTRN      Last vitals:   Visit Vitals  BP (!) 107/59 (BP Location: Right arm, Patient Position: Lying)   Pulse 67   Temp 36.5 °C (97.7 °F) (Axillary)   Resp 11   Wt 106.3 kg (234 lb 6.4 oz)   SpO2 99%   BMI 32.69 kg/m²

## 2022-04-28 NOTE — PT/OT/SLP EVAL
Physical Therapy Evaluation    Patient Name:  Usama Lee   MRN:  [id number]    Recommendations:     Discharge Recommendations:  home   Discharge Equipment Recommendations: none   Barriers to discharge: None    Assessment:     Usama Lee is a 76 y.o. male admitted with a medical diagnosis of Cervical myelopathy.  He presents with the following impairments/functional limitations:  pain Patient with good pain control. Has some resting tremor in UE's that is old. Patient able to walk in halls using rollator. Okay for home..    Rehab Prognosis: Good; patient would benefit from acute skilled PT services to address these deficits and reach maximum level of function.    Recent Surgery: Procedure(s) (LRB):  C3-C7 Laminectomy and Fusion (N/A) Day of Surgery    Plan:     During this hospitalization, patient to be seen 1 x/week to address the identified rehab impairments via   and progress toward the following goals:    · Plan of Care Expires:       Subjective     Chief Complaint: post op pain  Patient/Family Comments/goals: Plans on dc home in am  Pain/Comfort:  · Pain Rating 1: 5/10  · Location 1: cervical spine  · Pain Addressed 1: Cessation of Activity    Patients cultural, spiritual, Jew conflicts given the current situation: no    Living Environment:  Lives alone  Prior to admission, patients level of function was independent.  Equipment used at home: rollator, cane, straight.  DME owned (not currently used): rolling walker.  Upon discharge, patient will have assistance from son.    Objective:     Communicated with nurse prior to session.  Patient found supine with    upon PT entry to room.    General Precautions: Standard, fall   Orthopedic Precautions:    Braces:    Respiratory Status: Room air    Exams:  · 4/5 bilateral le, wnl rom bilateral le    Functional Mobility:  · Bed Mobility:     · Supine to Sit: contact guard assistance  · Sit to Supine: contact guard assistance  · Transfers:     · Sit  to Stand:  contact guard assistance with rolling walker  · Gait: ambulated 100 feet with rollator cga, 50% laura    Therapeutic Activities and Exercises:   na    AM-PAC 6 CLICK MOBILITY  Total Score:16     Patient left supine with call button in reach.    GOALS:   Multidisciplinary Problems     Physical Therapy Goals        Problem: Physical Therapy    Goal Priority Disciplines Outcome Goal Variances Interventions   Physical Therapy Goal     PT, PT/OT                      History:     Past Medical History:   Diagnosis Date    Diabetes     Gout     Heart disease     High cholesterol     HTN (hypertension)     Parkinson disease     Sleep apnea        Past Surgical History:   Procedure Laterality Date    FLEXOR TENDON REPAIR Left 8/19/2021    Procedure: REPAIR, TENDON, FLEXOR;  Surgeon: Daniel Dempsey MD;  Location: [facility];  Service: Orthopedics;  Laterality: Left;    TOTAL KNEE ARTHROPLASTY Right     TRIGGER FINGER RELEASE Left 8/19/2021    Procedure: RELEASE, TRIGGER FINGER,RING;  Surgeon: Daniel Dempsey MD;  Location: [facility];  Service: Orthopedics;  Laterality: Left;       Time Tracking:     PT Received On: 04/28/22  PT Start Time: 1730     PT Stop Time: 1755  PT Total Time (min): 25 min     Billable Minutes: Evaluation 20 04/28/2022

## 2022-04-28 NOTE — NURSING
1550 Pt resting in bed. Visitor at bedside. Set up dinner. Gave fresh water. No complaints or requests at this time. Call bell in reach. Side rails up x2.     1741 Pt resting in bed. Son at bedside. Gave medications. No complaints or requests at this time. Call bell in reach. Side rails up x2. Pt had just finished working with PT.

## 2022-04-28 NOTE — PLAN OF CARE
1054 pt to pacu pt resp reg and non labored pt dsg d/i to post neck no bleeding noted hemovac intact and charged pt sao2 99% on 7l fm will monitor pt awakens easily pt moving all ext well     1130 pt vs stable pt resting well pt voices no complaints pain level 0 sao2 94% on 2l nbp    1140 pt vs stable pt resting well orders to release to room per md    1145 pt released to n azra rn b/p 128/76 pulse 63 resp 12 sao2 94% on 2l nbp

## 2022-04-28 NOTE — ANESTHESIA PROCEDURE NOTES
Intubation    Date/Time: 4/28/2022 7:31 AM  Performed by: Tab Dick CRNA  Authorized by: Eris Singh MD     Intubation:     Induction:  Intravenous    Intubated:  Postinduction    Mask Ventilation:  Easy mask    Attempts:  2    Attempted By:  CRNA    Method of Intubation:  Direct    Blade:  Tereso 4    Laryngeal View Grade: Grade I - full view of cords      Laryngeal View Grade comment:  Cords spasmed on tube advancement, returned to mask ventilation, allowed more time for cecile to reach peak onset    Attempted By (2nd Attempt):  CRNA    Method of Intubation (2nd Attempt):  Direct    Blade (2nd Attempt):  Tereso 4    Laryngeal View Grade (2nd Attempt): Grade I - full view of cords      Difficult Airway Encountered?: No      Complications:  None    Airway Device:  Oral endotracheal tube    Airway Device Size:  7.5    Style/Cuff Inflation:  Cuffed (inflated to minimal occlusive pressure)    Inflation Amount (mL):  7    Tube secured:  23    Secured at:  The lips    Placement Verified By:  Capnometry    Findings Post-Intubation:  BS equal bilateral and atraumatic/condition of teeth unchanged  Notes:      Smooth, tolerated well

## 2022-04-28 NOTE — ANESTHESIA POSTPROCEDURE EVALUATION
Anesthesia Post Evaluation    Patient: Usama Lee    Procedure(s) Performed: Procedure(s) (LRB):  C3-C7 Laminectomy and Fusion (N/A)    Final Anesthesia Type: general      Patient location during evaluation: PACU  Patient participation: Yes- Able to Participate  Level of consciousness: awake and alert  Post-procedure vital signs: reviewed and stable  Pain management: adequate  Airway patency: patent  TREVER mitigation strategies: Multimodal analgesia  PONV status at discharge: No PONV  Anesthetic complications: no      Cardiovascular status: blood pressure returned to baseline  Respiratory status: unassisted  Hydration status: euvolemic  Follow-up not needed.          Vitals Value Taken Time   /92 04/28/22 1344   Temp 36.5 °C (97.7 °F) 04/28/22 1057   Pulse 71 04/28/22 1356   Resp 14 04/28/22 1323   SpO2 98 % 04/28/22 1356   Vitals shown include unvalidated device data.      Event Time   Out of Recovery 11:40:00         Pain/Magdy Score: Pain Rating Prior to Med Admin: 0 (4/28/2022  1:23 PM)  Magdy Score: 9 (4/28/2022 11:25 AM)

## 2022-04-28 NOTE — SUBJECTIVE & OBJECTIVE
Past Medical History:   Diagnosis Date    Diabetes     Gout     Heart disease     High cholesterol     HTN (hypertension)     Parkinson disease     Sleep apnea        Past Surgical History:   Procedure Laterality Date    FLEXOR TENDON REPAIR Left 8/19/2021    Procedure: REPAIR, TENDON, FLEXOR;  Surgeon: Daniel Dempsey MD;  Location: [facility];  Service: Orthopedics;  Laterality: Left;    TOTAL KNEE ARTHROPLASTY Right     TRIGGER FINGER RELEASE Left 8/19/2021    Procedure: RELEASE, TRIGGER FINGER,RING;  Surgeon: Daniel Dempsey MD;  Location: [facility] OR;  Service: Orthopedics;  Laterality: Left;       Review of patient's allergies indicates:   Allergen Reactions    Lovastatin      Other reaction(s): Muscle pain       No current facility-administered medications on file prior to encounter.     Current Outpatient Medications on File Prior to Encounter   Medication Sig    allopurinoL (ZYLOPRIM) 300 MG tablet TAKE 1 TABLET(S) BY MOUTH DAILY    aspirin (ECOTRIN) 81 MG EC tablet Take 1 tablet by mouth once daily.    atorvastatin (LIPITOR) 80 MG tablet TAKE ONE-HALF TABLET BY MOUTH DAILY FOR CHOLESTEROL. STOP MEDICATION AND CALL PROVIDER FOR ANY UNEXPLAINED MUSCLE ACHES,PAIN OR WEAKNESS    benztropine (COGENTIN) 1 MG tablet Take 1 tablet by mouth nightly.    carbidopa-levodopa  mg (SINEMET)  mg per tablet Take by mouth.    carbidopa-levodopa  mg (SINEMET)  mg per tablet 1 tablet    ferrous sulfate (FEOSOL) 325 mg (65 mg iron) Tab tablet Take by mouth.    fluticasone propionate (FLONASE) 50 mcg/actuation nasal spray INSTILL 1 SPRAY IN NOSTRIL(S) DAILY    isosorbide mononitrate (IMDUR) 30 MG 24 hr tablet Take 1 tablet by mouth once daily.    triamterene-hydrochlorothiazide 37.5-25 mg (DYAZIDE) 37.5-25 mg per capsule Take 1 capsule by mouth every morning.    doxycycline (MONODOX) 100 MG capsule TAKE ONE CAPSULE BY MOUTH DAILY WITH FOOD    nicotine polacrilex 2 MG Lozg DISSOLVE  1 LOZENGE IN MOUTH EVERY 2 HOURS AS NEEDED FOR SMOKING CESSATION    nitroGLYCERIN (NITROSTAT) 0.4 MG SL tablet DISSOLVE ONE TABLET UNDER THE TONGUE EVERY 5 MINUTES AS NEEDED    tadalafiL (CIALIS) 20 MG Tab TAKE 1/2 TABLET BY MOUTH ONE hour BEFORE sex     Family History    None       Tobacco Use    Smoking status: Never Smoker    Smokeless tobacco: Never Used   Substance and Sexual Activity    Alcohol use: Not Currently    Drug use: Not on file    Sexual activity: Not on file     Review of Systems   Constitutional:  Positive for activity change. Negative for appetite change, chills, diaphoresis and fever.   HENT:  Negative for trouble swallowing.    Eyes:  Negative for visual disturbance.   Respiratory:  Negative for chest tightness and shortness of breath.    Cardiovascular:  Negative for chest pain and palpitations.   Gastrointestinal:  Negative for abdominal pain, blood in stool, constipation, nausea and vomiting.   Genitourinary:  Negative for difficulty urinating, flank pain and hematuria.   Musculoskeletal:  Positive for arthralgias and gait problem.   Neurological:  Negative for dizziness, facial asymmetry, light-headedness, numbness and headaches.   All other systems reviewed and are negative.  Objective:     Vital Signs (Most Recent):  Temp: 96.7 °F (35.9 °C) (04/28/22 1538)  Pulse: 60 (04/28/22 1538)  Resp: 18 (04/28/22 1546)  BP: (!) 115/57 (04/28/22 1538)  SpO2: 95 % (04/28/22 1538)   Vital Signs (24h Range):  Temp:  [96.7 °F (35.9 °C)-98.6 °F (37 °C)] 96.7 °F (35.9 °C)  Pulse:  [60-82] 60  Resp:  [7-20] 18  SpO2:  [93 %-100 %] 95 %  BP: ()/(50-92) 115/57     Weight: 111.3 kg (245 lb 4.8 oz)  Body mass index is 34.21 kg/m².    Physical Exam  Vitals reviewed.   Constitutional:       General: He is not in acute distress.     Appearance: He is obese.   HENT:      Head: Normocephalic and atraumatic.      Nose: Nose normal.      Mouth/Throat:      Mouth: Mucous membranes are moist.   Eyes:       Extraocular Movements: Extraocular movements intact.      Pupils: Pupils are equal, round, and reactive to light.   Cardiovascular:      Rate and Rhythm: Normal rate and regular rhythm.      Pulses: Normal pulses.      Heart sounds: Normal heart sounds. No murmur heard.  Pulmonary:      Effort: Pulmonary effort is normal. No respiratory distress.      Breath sounds: Normal breath sounds.   Abdominal:      Palpations: Abdomen is soft.      Tenderness: There is no abdominal tenderness. There is no guarding.   Musculoskeletal:         General: Normal range of motion.      Cervical back: Normal range of motion and neck supple. Tenderness present.   Skin:     General: Skin is warm.      Capillary Refill: Capillary refill takes less than 2 seconds.      Comments: dsg  d/i with minimal blood stain to posterior cervical region   Neurological:      General: No focal deficit present.      Mental Status: He is alert and oriented to person, place, and time.      GCS: GCS eye subscore is 4. GCS verbal subscore is 5. GCS motor subscore is 6.      Cranial Nerves: Cranial nerves are intact. No cranial nerve deficit.      Sensory: Sensation is intact. No sensory deficit.      Motor: Motor function is intact.   Psychiatric:         Mood and Affect: Mood normal.         Behavior: Behavior normal.       Significant Labs: All pertinent labs within the past 24 hours have been reviewed.    Significant Imaging: I have reviewed all pertinent imaging results/findings within the past 24 hours.

## 2022-04-28 NOTE — ANESTHESIA PREPROCEDURE EVALUATION
04/28/2022  Usama Lee is a 76 y.o., male.      Pre-op Assessment    I have reviewed the Patient Summary Reports.    I have reviewed the NPO Status.   I have reviewed the Medications.     Review of Systems  Social:  Non-Smoker, No Alcohol Use    Hematology/Oncology:  Hematology Normal   Oncology Normal     EENT/Dental:EENT/Dental Normal   Cardiovascular:   Pacemaker Hypertension CABG/stent ECG has been reviewed.    Pulmonary:   Sleep Apnea, CPAP    Renal/:  Renal/ Normal     Hepatic/GI:  Hepatic/GI Normal    Musculoskeletal:  Musculoskeletal Normal    Neurological:  Neurology Normal  Movement Disorder Dx, Parkinson's Disease   Endocrine:   Diabetes    Dermatological:  Skin Normal    Psych:  Psychiatric Normal           Physical Exam  General: Well nourished, Cooperative, Alert and Oriented    Airway:  Mallampati: II / II  Mouth Opening: Normal  TM Distance: Normal  Neck ROM: Normal ROM    Dental:  Intact    Chest/Lungs:  Clear to auscultation    Heart:  Rate: Normal  Rhythm: Regular Rhythm  Sounds: Normal        Chemistry        Component Value Date/Time     04/13/2022 0935    K 4.4 04/13/2022 0935     04/13/2022 0935    CO2 32 04/13/2022 0935    BUN 14 04/13/2022 0935    CREATININE 0.96 04/13/2022 0935     (H) 04/13/2022 0935        Component Value Date/Time    CALCIUM 9.0 04/13/2022 0935    EGFRNONAA 81 04/13/2022 0935        Lab Results   Component Value Date    WBC 4.25 (L) 04/13/2022    RBC 4.16 (L) 04/13/2022    HGB 12.7 (L) 04/13/2022    MCV 95.2 04/13/2022    MCH 30.5 04/13/2022    MCHC 32.1 04/13/2022    RDW 13.3 04/13/2022     04/13/2022    MPV 9.9 04/13/2022    LYMPH 28.2 04/13/2022    LYMPH 1.20 04/13/2022    MONO 7.8 (H) 04/13/2022    EOS 0.16 04/13/2022    BASO 0.02 04/13/2022     Results for orders placed or performed in visit on 04/13/22   EKG 12-lead     Collection Time: 04/13/22 10:04 AM    Narrative    Test Reason : Z01.818,    Vent. Rate : 081 BPM     Atrial Rate : 081 BPM     P-R Int : 342 ms          QRS Dur : 084 ms      QT Int : 372 ms       P-R-T Axes : 092 -36 -32 degrees     QTc Int : 432 ms    Atrial-paced rhythm with prolonged AV conduction  Left axis deviation  Low voltage QRS  Nonspecific ST and T wave abnormality  Abnormal ECG  When compared with ECG of 19-AUG-2021 11:27,  No significant change was found  Confirmed by Lisandra Elmore MD ([id number]) on 4/14/2022 11:13:07 AM    Referred By: LUDIN LACKEY           Confirmed By:Lisandra Elmore MD         Anesthesia Plan  Type of Anesthesia, risks & benefits discussed:    Anesthesia Type: Gen ETT  Intra-op Monitoring Plan: Standard ASA Monitors  Post Op Pain Control Plan: multimodal analgesia  Induction:  IV  Airway Plan: Direct  Informed Consent: Informed consent signed with the Patient and all parties understand the risks and agree with anesthesia plan.  All questions answered. Patient consented to blood products? Yes  ASA Score: 4  Day of Surgery Review of History & Physical: H&P Update referred to the surgeon/provider.    Ready For Surgery From Anesthesia Perspective.     .

## 2022-04-28 NOTE — ASSESSMENT & PLAN NOTE
- S/p c3-c7 laminectomy  - Orders per orthopedics specific to surgery   - SCD lower legs   - Anticoagulation When ok Surgery   - PO Protonix daily for ulcer ppx   - Incentive spirometry hourly    - early ambulation with pt/ot

## 2022-04-28 NOTE — CONSULTS
[facility]  [facility] Medicine  Consult Note    Patient Name: Usama Lee  MRN: [id number]  Admission Date: 4/28/2022  Hospital Length of Stay: 0 days  Attending Physician: Dr. Surya Lu  Primary Care Provider: Clayton Padilla MD           Patient information was obtained from patient and ER records.     Consults  Subjective:     Principal Problem: Cervical myelopathy    Chief Complaint: No chief complaint on file.       HPI: 77 y/o male seen in consult today s/p C3-C7 laminectomy 2/2 to cervical myelopathy. He reports insidious pain to lower back radiating to his lower extremities with worsening balance requiring a cane to ambulate. He also states he has bilateral hand dysfunction with decreased  strength. Pt has failed conservative treatment including medication and injections for pain. Patient reports pain and weakness has worsened and interrupts his activities of daily living. Upon exam pt awakes easily and answers questions with his son at bedside. Denies a history of smoking or alcohol use.Thank you for your consultation and we will continue to follow during this admission.    Past medical history of DM Type 2, Gout, Heart disease, Pacemaker (), HLD, HTN, L4-5 fusion 2012.                Past Medical History:   Diagnosis Date    Diabetes     Gout     Heart disease     High cholesterol     HTN (hypertension)     Parkinson disease     Sleep apnea        Past Surgical History:   Procedure Laterality Date    FLEXOR TENDON REPAIR Left 8/19/2021    Procedure: REPAIR, TENDON, FLEXOR;  Surgeon: Daniel Dempsey MD;  Location: [facility];  Service: Orthopedics;  Laterality: Left;    TOTAL KNEE ARTHROPLASTY Right     TRIGGER FINGER RELEASE Left 8/19/2021    Procedure: RELEASE, TRIGGER FINGER,RING;  Surgeon: Daniel Dempsey MD;  Location: [facility];  Service: Orthopedics;  Laterality: Left;       Review of patient's allergies indicates:   Allergen  Reactions    Lovastatin      Other reaction(s): Muscle pain       No current facility-administered medications on file prior to encounter.     Current Outpatient Medications on File Prior to Encounter   Medication Sig    allopurinoL (ZYLOPRIM) 300 MG tablet TAKE 1 TABLET(S) BY MOUTH DAILY    aspirin (ECOTRIN) 81 MG EC tablet Take 1 tablet by mouth once daily.    atorvastatin (LIPITOR) 80 MG tablet TAKE ONE-HALF TABLET BY MOUTH DAILY FOR CHOLESTEROL. STOP MEDICATION AND CALL PROVIDER FOR ANY UNEXPLAINED MUSCLE ACHES,PAIN OR WEAKNESS    benztropine (COGENTIN) 1 MG tablet Take 1 tablet by mouth nightly.    carbidopa-levodopa  mg (SINEMET)  mg per tablet Take by mouth.    carbidopa-levodopa  mg (SINEMET)  mg per tablet 1 tablet    ferrous sulfate (FEOSOL) 325 mg (65 mg iron) Tab tablet Take by mouth.    fluticasone propionate (FLONASE) 50 mcg/actuation nasal spray INSTILL 1 SPRAY IN NOSTRIL(S) DAILY    isosorbide mononitrate (IMDUR) 30 MG 24 hr tablet Take 1 tablet by mouth once daily.    triamterene-hydrochlorothiazide 37.5-25 mg (DYAZIDE) 37.5-25 mg per capsule Take 1 capsule by mouth every morning.    doxycycline (MONODOX) 100 MG capsule TAKE ONE CAPSULE BY MOUTH DAILY WITH FOOD    nicotine polacrilex 2 MG Lozg DISSOLVE 1 LOZENGE IN MOUTH EVERY 2 HOURS AS NEEDED FOR SMOKING CESSATION    nitroGLYCERIN (NITROSTAT) 0.4 MG SL tablet DISSOLVE ONE TABLET UNDER THE TONGUE EVERY 5 MINUTES AS NEEDED    tadalafiL (CIALIS) 20 MG Tab TAKE 1/2 TABLET BY MOUTH ONE hour BEFORE sex     Family History    None       Tobacco Use    Smoking status: Never Smoker    Smokeless tobacco: Never Used   Substance and Sexual Activity    Alcohol use: Not Currently    Drug use: Not on file    Sexual activity: Not on file     Review of Systems   Constitutional:  Positive for activity change. Negative for appetite change, chills, diaphoresis and fever.   HENT:  Negative for trouble swallowing.    Eyes:   Negative for visual disturbance.   Respiratory:  Negative for chest tightness and shortness of breath.    Cardiovascular:  Negative for chest pain and palpitations.   Gastrointestinal:  Negative for abdominal pain, blood in stool, constipation, nausea and vomiting.   Genitourinary:  Negative for difficulty urinating, flank pain and hematuria.   Musculoskeletal:  Positive for arthralgias and gait problem.   Neurological:  Negative for dizziness, facial asymmetry, light-headedness, numbness and headaches.   All other systems reviewed and are negative.  Objective:     Vital Signs (Most Recent):  Temp: 96.7 °F (35.9 °C) (04/28/22 1538)  Pulse: 60 (04/28/22 1538)  Resp: 18 (04/28/22 1546)  BP: (!) 115/57 (04/28/22 1538)  SpO2: 95 % (04/28/22 1538)   Vital Signs (24h Range):  Temp:  [96.7 °F (35.9 °C)-98.6 °F (37 °C)] 96.7 °F (35.9 °C)  Pulse:  [60-82] 60  Resp:  [7-20] 18  SpO2:  [93 %-100 %] 95 %  BP: ()/(50-92) 115/57     Weight: 111.3 kg (245 lb 4.8 oz)  Body mass index is 34.21 kg/m².    Physical Exam  Vitals reviewed.   Constitutional:       General: He is not in acute distress.     Appearance: He is obese.   HENT:      Head: Normocephalic and atraumatic.      Nose: Nose normal.      Mouth/Throat:      Mouth: Mucous membranes are moist.   Eyes:      Extraocular Movements: Extraocular movements intact.      Pupils: Pupils are equal, round, and reactive to light.   Cardiovascular:      Rate and Rhythm: Normal rate and regular rhythm.      Pulses: Normal pulses.      Heart sounds: Normal heart sounds. No murmur heard.  Pulmonary:      Effort: Pulmonary effort is normal. No respiratory distress.      Breath sounds: Normal breath sounds.   Abdominal:      Palpations: Abdomen is soft.      Tenderness: There is no abdominal tenderness. There is no guarding.   Musculoskeletal:         General: Normal range of motion.      Cervical back: Normal range of motion and neck supple. Tenderness present.   Skin:     General:  Skin is warm.      Capillary Refill: Capillary refill takes less than 2 seconds.      Comments: dsg  d/i with minimal blood stain to posterior cervical region   Neurological:      General: No focal deficit present.      Mental Status: He is alert and oriented to person, place, and time.      GCS: GCS eye subscore is 4. GCS verbal subscore is 5. GCS motor subscore is 6.      Cranial Nerves: Cranial nerves are intact. No cranial nerve deficit.      Sensory: Sensation is intact. No sensory deficit.      Motor: Motor function is intact.   Psychiatric:         Mood and Affect: Mood normal.         Behavior: Behavior normal.       Significant Labs: All pertinent labs within the past 24 hours have been reviewed.    Significant Imaging: I have reviewed all pertinent imaging results/findings within the past 24 hours.    Assessment/Plan:     * Cervical myelopathy  - S/p c3-c7 laminectomy  - Orders per orthopedics specific to surgery   - SCD lower legs   - Anticoagulation When ok Surgery   - PO Protonix daily for ulcer ppx   - Incentive spirometry hourly    - early ambulation with pt/ot       Parkinson disease  Chronic, resume home medications.          HLD (hyperlipidemia)  Chronic, resume home medications.          HTN (hypertension)  Chronic, resume home medications.        VTE Risk Mitigation (From admission, onward)         Ordered     Place RYAN hose  Until discontinued         04/28/22 1429     Place sequential compression device  Until discontinued         04/28/22 1429     Place sequential compression device  Until discontinued         04/28/22 1429     Place RYAN hose  Until discontinued         04/28/22 1429                    Thank you for your consult. I will follow-up with patient. Please contact us if you have any additional questions.    BETHANY Nichole  Department of Hospital Medicine   [facility] - Orthopedic

## 2022-04-28 NOTE — HPI
77 y/o male seen in consult today s/p C3-C7 laminectomy 2/2 to cervical myelopathy. He reports insidious pain to lower back radiating to his lower extremities with worsening balance requiring a cane to ambulate. He also states he has bilateral hand dysfunction with decreased  strength. Pt has failed conservative treatment including medication and injections for pain. Patient reports pain and weakness has worsened and interrupts his activities of daily living. Upon exam pt awakes easily and answers questions with his son at bedside. Denies a history of smoking or alcohol use.Thank you for your consultation and we will continue to follow during this admission.    Past medical history of DM Type 2, Gout, Heart disease, Pacemaker (), HLD, HTN, L4-5 fusion 2012.

## 2022-04-29 VITALS
BODY MASS INDEX: 34.34 KG/M2 | OXYGEN SATURATION: 98 % | RESPIRATION RATE: 18 BRPM | HEART RATE: 63 BPM | WEIGHT: 245.31 LBS | HEIGHT: 71 IN | TEMPERATURE: 98 F | DIASTOLIC BLOOD PRESSURE: 65 MMHG | SYSTOLIC BLOOD PRESSURE: 132 MMHG

## 2022-04-29 LAB
ALBUMIN SERPL BCP-MCNC: 2.8 G/DL (ref 3.5–5)
ALBUMIN/GLOB SERPL: 0.9 {RATIO}
ALP SERPL-CCNC: 105 U/L (ref 45–115)
ALT SERPL W P-5'-P-CCNC: 19 U/L (ref 16–61)
ANION GAP SERPL CALCULATED.3IONS-SCNC: 10 MMOL/L (ref 7–16)
ANION GAP SERPL CALCULATED.3IONS-SCNC: 12 MMOL/L (ref 7–16)
AST SERPL W P-5'-P-CCNC: 18 U/L (ref 15–37)
BASOPHILS # BLD AUTO: 0.01 K/UL (ref 0–0.2)
BASOPHILS NFR BLD AUTO: 0.1 % (ref 0–1)
BILIRUB SERPL-MCNC: 0.5 MG/DL (ref 0–1.2)
BUN SERPL-MCNC: 13 MG/DL (ref 7–18)
BUN SERPL-MCNC: 14 MG/DL (ref 7–18)
BUN/CREAT SERPL: 13 (ref 6–20)
BUN/CREAT SERPL: 14 (ref 6–20)
CALCIUM SERPL-MCNC: 8.1 MG/DL (ref 8.5–10.1)
CALCIUM SERPL-MCNC: 8.2 MG/DL (ref 8.5–10.1)
CHLORIDE SERPL-SCNC: 101 MMOL/L (ref 98–107)
CHLORIDE SERPL-SCNC: 102 MMOL/L (ref 98–107)
CO2 SERPL-SCNC: 26 MMOL/L (ref 21–32)
CO2 SERPL-SCNC: 30 MMOL/L (ref 21–32)
CREAT SERPL-MCNC: 0.93 MG/DL (ref 0.7–1.3)
CREAT SERPL-MCNC: 1.09 MG/DL (ref 0.7–1.3)
DIFFERENTIAL METHOD BLD: ABNORMAL
EOSINOPHIL # BLD AUTO: 0 K/UL (ref 0–0.5)
EOSINOPHIL NFR BLD AUTO: 0 % (ref 1–4)
ERYTHROCYTE [DISTWIDTH] IN BLOOD BY AUTOMATED COUNT: 12.8 % (ref 11.5–14.5)
GLOBULIN SER-MCNC: 3 G/DL (ref 2–4)
GLUCOSE SERPL-MCNC: 193 MG/DL (ref 74–106)
GLUCOSE SERPL-MCNC: 204 MG/DL (ref 70–105)
GLUCOSE SERPL-MCNC: 241 MG/DL (ref 74–106)
HCT VFR BLD AUTO: 36 % (ref 40–54)
HGB BLD-MCNC: 12.2 G/DL (ref 13.5–18)
IMM GRANULOCYTES # BLD AUTO: 0.06 K/UL (ref 0–0.04)
IMM GRANULOCYTES NFR BLD: 0.6 % (ref 0–0.4)
LYMPHOCYTES # BLD AUTO: 0.8 K/UL (ref 1–4.8)
LYMPHOCYTES NFR BLD AUTO: 7.4 % (ref 27–41)
MCH RBC QN AUTO: 31 PG (ref 27–31)
MCHC RBC AUTO-ENTMCNC: 33.9 G/DL (ref 32–36)
MCV RBC AUTO: 91.4 FL (ref 80–96)
MONOCYTES # BLD AUTO: 0.63 K/UL (ref 0–0.8)
MONOCYTES NFR BLD AUTO: 5.8 % (ref 2–6)
MPC BLD CALC-MCNC: 9.8 FL (ref 9.4–12.4)
NEUTROPHILS # BLD AUTO: 9.34 K/UL (ref 1.8–7.7)
NEUTROPHILS NFR BLD AUTO: 86.1 % (ref 53–65)
NRBC # BLD AUTO: 0 X10E3/UL
NRBC, AUTO (.00): 0 %
PLATELET # BLD AUTO: 164 K/UL (ref 150–400)
POTASSIUM SERPL-SCNC: 4.2 MMOL/L (ref 3.5–5.1)
POTASSIUM SERPL-SCNC: 5.1 MMOL/L (ref 3.5–5.1)
PROT SERPL-MCNC: 5.8 G/DL (ref 6.4–8.2)
RBC # BLD AUTO: 3.94 M/UL (ref 4.6–6.2)
SODIUM SERPL-SCNC: 135 MMOL/L (ref 136–145)
SODIUM SERPL-SCNC: 137 MMOL/L (ref 136–145)
T4 SERPL-MCNC: 8 ΜG/DL (ref 4.5–12.1)
TSH SERPL DL<=0.005 MIU/L-ACNC: 0.42 UIU/ML (ref 0.36–3.74)
WBC # BLD AUTO: 10.84 K/UL (ref 4.5–11)

## 2022-04-29 PROCEDURE — 82962 GLUCOSE BLOOD TEST: CPT

## 2022-04-29 PROCEDURE — 99232 PR SUBSEQUENT HOSPITAL CARE,LEVL II: ICD-10-PCS | Mod: ,,, | Performed by: HOSPITALIST

## 2022-04-29 PROCEDURE — 99232 SBSQ HOSP IP/OBS MODERATE 35: CPT | Mod: ,,, | Performed by: HOSPITALIST

## 2022-04-29 PROCEDURE — 25000003 PHARM REV CODE 250: Performed by: ORTHOPAEDIC SURGERY

## 2022-04-29 PROCEDURE — 84443 ASSAY THYROID STIM HORMONE: CPT | Performed by: HOSPITALIST

## 2022-04-29 PROCEDURE — 80053 COMPREHEN METABOLIC PANEL: CPT | Performed by: HOSPITALIST

## 2022-04-29 PROCEDURE — 84436 ASSAY OF TOTAL THYROXINE: CPT | Performed by: HOSPITALIST

## 2022-04-29 PROCEDURE — 25000242 PHARM REV CODE 250 ALT 637 W/ HCPCS: Performed by: ORTHOPAEDIC SURGERY

## 2022-04-29 PROCEDURE — 36415 COLL VENOUS BLD VENIPUNCTURE: CPT | Performed by: HOSPITALIST

## 2022-04-29 PROCEDURE — 97165 OT EVAL LOW COMPLEX 30 MIN: CPT

## 2022-04-29 PROCEDURE — 63600175 PHARM REV CODE 636 W HCPCS: Performed by: ORTHOPAEDIC SURGERY

## 2022-04-29 PROCEDURE — 85025 COMPLETE CBC W/AUTO DIFF WBC: CPT | Performed by: REGISTERED NURSE

## 2022-04-29 RX ADMIN — PANTOPRAZOLE SODIUM 40 MG: 40 TABLET, DELAYED RELEASE ORAL at 08:04

## 2022-04-29 RX ADMIN — GABAPENTIN 400 MG: 400 CAPSULE ORAL at 08:04

## 2022-04-29 RX ADMIN — MUPIROCIN: 20 OINTMENT TOPICAL at 08:04

## 2022-04-29 RX ADMIN — OXYCODONE HYDROCHLORIDE 5 MG: 5 TABLET ORAL at 08:04

## 2022-04-29 RX ADMIN — TAMSULOSIN HYDROCHLORIDE 0.4 MG: 0.4 CAPSULE ORAL at 08:04

## 2022-04-29 RX ADMIN — OXYCODONE HYDROCHLORIDE 5 MG: 5 TABLET ORAL at 05:04

## 2022-04-29 RX ADMIN — POLYETHYLENE GLYCOL 3350 17 G: 17 POWDER, FOR SOLUTION ORAL at 08:04

## 2022-04-29 RX ADMIN — INSULIN ASPART 4 UNITS: 100 INJECTION, SOLUTION INTRAVENOUS; SUBCUTANEOUS at 08:04

## 2022-04-29 RX ADMIN — FLUTICASONE PROPIONATE 100 MCG: 50 SPRAY, METERED NASAL at 08:04

## 2022-04-29 RX ADMIN — OXYCODONE HYDROCHLORIDE 5 MG: 5 TABLET ORAL at 12:04

## 2022-04-29 RX ADMIN — ALLOPURINOL 300 MG: 300 TABLET ORAL at 08:04

## 2022-04-29 RX ADMIN — CARBIDOPA AND LEVODOPA 1 TABLET: 25; 100 TABLET ORAL at 08:04

## 2022-04-29 RX ADMIN — CARVEDILOL 25 MG: 25 TABLET, FILM COATED ORAL at 08:04

## 2022-04-29 RX ADMIN — FAMOTIDINE 20 MG: 20 TABLET ORAL at 08:04

## 2022-04-29 RX ADMIN — ATORVASTATIN CALCIUM 80 MG: 80 TABLET, FILM COATED ORAL at 08:04

## 2022-04-29 RX ADMIN — DOCUSATE SODIUM 100 MG: 100 CAPSULE, LIQUID FILLED ORAL at 08:04

## 2022-04-29 RX ADMIN — ISOSORBIDE MONONITRATE 30 MG: 30 TABLET, EXTENDED RELEASE ORAL at 08:04

## 2022-04-29 RX ADMIN — SENNOSIDES AND DOCUSATE SODIUM 1 TABLET: 50; 8.6 TABLET ORAL at 08:04

## 2022-04-29 RX ADMIN — CEFAZOLIN SODIUM 2 G: 10 INJECTION, POWDER, FOR SOLUTION INTRAVENOUS at 12:04

## 2022-04-29 RX ADMIN — TRIAMTERENE AND HYDROCHLOROTHIAZIDE 1 TABLET: 37.5; 25 TABLET ORAL at 08:04

## 2022-04-29 RX ADMIN — FERROUS SULFATE TAB 325 MG (65 MG ELEMENTAL FE) 1 EACH: 325 (65 FE) TAB at 08:04

## 2022-04-29 NOTE — PLAN OF CARE
Problem: Occupational Therapy  Goal: Occupational Therapy Goal  Description: STG:  Pt will perform grooming with setup  Pt will bathe with setup  Pt will perform UE dressing with setup  Pt will perform LE dressing with SBA and setup  Pt will transfer bed/chair/toilet with SBA with rollator  Pt will perform standing task x 3 min with SBA with rollator  Pt will tolerate 20 minutes of tx without fatigue      LT.Restore to max I with self care and mobility.     Outcome: Adequate for Care Transition   OT low complexity evaluation performed. Pt is safe for D/C to home with home health services.

## 2022-04-29 NOTE — ASSESSMENT & PLAN NOTE
- S/p c3-c7 laminectomy  - Orders per orthopedics specific to surgery   - SCD lower legs   - Anticoagulation When ok Surgery   - PO Protonix daily for ulcer ppx   - Incentive spirometry hourly    - early ambulation with pt/ot   4/29: PO day # 1. Progressing well. Plan for dc today.

## 2022-04-29 NOTE — PLAN OF CARE
Problem: Adult Inpatient Plan of Care  Goal: Plan of Care Review  Outcome: Ongoing, Progressing  Flowsheets (Taken 4/29/2022 0540)  Plan of Care Reviewed With: patient  Goal: Patient-Specific Goal (Individualized)  Outcome: Ongoing, Progressing  Flowsheets (Taken 4/29/2022 0540)  Individualized Care Needs: Pain management  Patient-Specific Goals (Include Timeframe): Pain management  Goal: Absence of Hospital-Acquired Illness or Injury  Outcome: Ongoing, Progressing  Intervention: Identify and Manage Fall Risk  Flowsheets (Taken 4/29/2022 0540)  Safety Promotion/Fall Prevention:   assistive device/personal item within reach   bed alarm set   commode/urinal/bedpan at bedside   diversional activities provided   lighting adjusted   medications reviewed   nonskid shoes/socks when out of bed   side rails raised x 3   instructed to call staff for mobility  Intervention: Prevent Skin Injury  Flowsheets (Taken 4/29/2022 0540)  Body Position: position changed independently  Skin Protection: adhesive use limited  Intervention: Prevent and Manage VTE (Venous Thromboembolism) Risk  Flowsheets (Taken 4/29/2022 0540)  Activity Management:   Arm raise - L1   Ambulated -L4  VTE Prevention/Management:   remove, assess skin, and reapply sequential compression device   remove, assess skin, and reapply compression stockings   ambulation promoted   ROM (active) performed   fluids promoted  Range of Motion: ROM (range of motion) performed  Intervention: Prevent Infection  Flowsheets (Taken 4/29/2022 0540)  Infection Prevention:   equipment surfaces disinfected   hand hygiene promoted   rest/sleep promoted   single patient room provided  Goal: Optimal Comfort and Wellbeing  Outcome: Ongoing, Progressing  Intervention: Monitor Pain and Promote Comfort  Flowsheets (Taken 4/29/2022 0540)  Pain Management Interventions:   care clustered   diversional activity provided   quiet environment facilitated   relaxation techniques promoted   pillow  support provided  Intervention: Provide Person-Centered Care  Flowsheets (Taken 4/29/2022 0540)  Trust Relationship/Rapport:   care explained   choices provided   questions answered   questions encouraged     Problem: Infection  Goal: Absence of Infection Signs and Symptoms  Outcome: Ongoing, Progressing  Intervention: Prevent or Manage Infection  Flowsheets (Taken 4/29/2022 0540)  Fever Reduction/Comfort Measures: lightweight bedding  Infection Management: aseptic technique maintained  Isolation Precautions:   protective   precautions maintained     Problem: Diabetes Comorbidity  Goal: Blood Glucose Level Within Targeted Range  Outcome: Ongoing, Progressing  Intervention: Monitor and Manage Glycemia  Flowsheets (Taken 4/29/2022 0540)  Glycemic Management: blood glucose monitored     Problem: Fall Injury Risk  Goal: Absence of Fall and Fall-Related Injury  Outcome: Ongoing, Progressing  Intervention: Identify and Manage Contributors  Flowsheets (Taken 4/29/2022 0540)  Medication Review/Management: medications reviewed  Intervention: Promote Injury-Free Environment  Flowsheets (Taken 4/29/2022 0540)  Safety Promotion/Fall Prevention:   assistive device/personal item within reach   bed alarm set   commode/urinal/bedpan at bedside   diversional activities provided   lighting adjusted   medications reviewed   nonskid shoes/socks when out of bed   side rails raised x 3   instructed to call staff for mobility

## 2022-04-29 NOTE — DISCHARGE SUMMARY
[facility], [place] - Orthopedic  Discharge Note  Short Stay    Procedure(s) (LRB):  C3-C7 Laminectomy and Fusion (N/A)    OUTCOME: Condition has improved and patient is now ready for discharge.    DISPOSITION: Home-Health Care [facility] [place]    FINAL DIAGNOSIS:  Cervical myelopathy    FOLLOWUP: In clinic    DISCHARGE INSTRUCTIONS:  No discharge procedures on file.     TIME SPENT ON DISCHARGE: 30 minutes

## 2022-04-29 NOTE — DISCHARGE INSTRUCTIONS
Office: [phone]  Email: [email or web address]    Spine Discharge Instructions    - Follow up with Dr. Blake in 10-14 days. Please call for appointment (if not already scheduled).   - Keep dressing clean and dry. May remove dressing 3 days after surgery  - May shower upon discharge. Do not scrub, tub bathe, or submerge the incision.  - No lifting greater than 10 pounds.  - Ambulate multiple times each day   - You may bend and twist for usual daily activities  - You may sleep in any position that is comfortable  - Do not drive a motor vehicle while on narcotic pain medication.   - Utilize pain medication (narcotics) as prescribed  - Do not exceed 3g Tylenol in a 24 hour period.   - Use stool softeners while taking narcotics to prevent constipation   - Resume your usual diet

## 2022-04-29 NOTE — PROGRESS NOTES
[facility] Orthopedic  [facility] Medicine  Progress Note    Patient Name: Usama Lee  MRN: [id number]  Patient Class: IP- Surgery Admit   Admission Date: 4/28/2022  Length of Stay: 1 days  Attending Physician: Charbel Blake MD  Primary Care Provider: Clayton Padilla MD        Subjective:     Principal Problem:Cervical myelopathy        HPI:  77 y/o male seen in consult today s/p C3-C7 laminectomy 2/2 to cervical myelopathy. He reports insidious pain to lower back radiating to his lower extremities with worsening balance requiring a cane to ambulate. He also states he has bilateral hand dysfunction with decreased  strength. Pt has failed conservative treatment including medication and injections for pain. Patient reports pain and weakness has worsened and interrupts his activities of daily living. Upon exam pt awakes easily and answers questions with his son at bedside. Denies a history of smoking or alcohol use.Thank you for your consultation and we will continue to follow during this admission.    Past medical history of DM Type 2, Gout, Heart disease, Pacemaker (), HLD, HTN, L4-5 fusion 2012.                Overview/Hospital Course:  No notes on file    Interval History: No acute events overnight. Pt up in chair    Review of Systems   Constitutional:  Positive for activity change. Negative for appetite change, chills, diaphoresis and fever.   HENT:  Negative for trouble swallowing.    Eyes:  Negative for visual disturbance.   Respiratory:  Negative for chest tightness and shortness of breath.    Cardiovascular:  Negative for chest pain and palpitations.   Gastrointestinal:  Negative for abdominal pain, blood in stool, constipation, nausea and vomiting.   Genitourinary:  Negative for difficulty urinating, flank pain and hematuria.   Musculoskeletal:  Positive for arthralgias and gait problem.   Neurological:  Negative for dizziness, facial asymmetry, light-headedness, numbness and  headaches.   All other systems reviewed and are negative.  Objective:     Vital Signs (Most Recent):  Temp: 97.9 °F (36.6 °C) (04/29/22 0727)  Pulse: 63 (04/29/22 0727)  Resp: 18 (04/29/22 0843)  BP: 132/65 (04/29/22 0727)  SpO2: 98 % (04/29/22 0727) Vital Signs (24h Range):  Temp:  [96.7 °F (35.9 °C)-97.9 °F (36.6 °C)] 97.9 °F (36.6 °C)  Pulse:  [60-83] 63  Resp:  [7-20] 18  SpO2:  [93 %-100 %] 98 %  BP: ()/(54-92) 132/65     Weight: 111.3 kg (245 lb 4.8 oz)  Body mass index is 34.21 kg/m².    Intake/Output Summary (Last 24 hours) at 4/29/2022 1002  Last data filed at 4/28/2022 1051  Gross per 24 hour   Intake 850 ml   Output --   Net 850 ml      Physical Exam  Vitals reviewed.   Constitutional:       General: He is not in acute distress.     Appearance: He is obese.   HENT:      Head: Normocephalic and atraumatic.      Nose: Nose normal.      Mouth/Throat:      Mouth: Mucous membranes are moist.   Eyes:      Extraocular Movements: Extraocular movements intact.      Pupils: Pupils are equal, round, and reactive to light.   Cardiovascular:      Rate and Rhythm: Normal rate and regular rhythm.      Pulses: Normal pulses.      Heart sounds: Normal heart sounds. No murmur heard.  Pulmonary:      Effort: Pulmonary effort is normal. No respiratory distress.      Breath sounds: Normal breath sounds.   Abdominal:      Palpations: Abdomen is soft.      Tenderness: There is no abdominal tenderness. There is no guarding.   Musculoskeletal:         General: Normal range of motion.      Cervical back: Normal range of motion and neck supple. Tenderness present.   Skin:     General: Skin is warm.      Capillary Refill: Capillary refill takes less than 2 seconds.      Comments: dsg  d/i with minimal blood stain to posterior cervical region   Neurological:      General: No focal deficit present.      Mental Status: He is alert and oriented to person, place, and time.      GCS: GCS eye subscore is 4. GCS verbal subscore is 5.  GCS motor subscore is 6.      Cranial Nerves: Cranial nerves are intact. No cranial nerve deficit.      Sensory: Sensation is intact. No sensory deficit.      Motor: Motor function is intact.   Psychiatric:         Mood and Affect: Mood normal.         Behavior: Behavior normal.       Significant Labs: All pertinent labs within the past 24 hours have been reviewed.    Significant Imaging: I have reviewed all pertinent imaging results/findings within the past 24 hours.      Assessment/Plan:      * Cervical myelopathy  - S/p c3-c7 laminectomy  - Orders per orthopedics specific to surgery   - SCD lower legs   - Anticoagulation When ok Surgery   - PO Protonix daily for ulcer ppx   - Incentive spirometry hourly    - early ambulation with pt/ot   4/29: PO day # 1. Progressing well. Plan for dc today.      Parkinson disease  Chronic, resume home medications.          HLD (hyperlipidemia)  Chronic, resume home medications.          HTN (hypertension)  Chronic, resume home medications.        VTE Risk Mitigation (From admission, onward)         Ordered     Place RYAN hose  Until discontinued         04/28/22 1429     Place sequential compression device  Until discontinued         04/28/22 1429     Place sequential compression device  Until discontinued         04/28/22 1429     Place RYAN hose  Until discontinued         04/28/22 1429                Discharge Planning   ALLEN: 4/29/2022     Code Status: Full Code   Is the patient medically ready for discharge?:     Reason for patient still in hospital (select all that apply): Pending disposition                     BETHANY Nichole  Department of Hospital Medicine   [facility] - Orthopedic

## 2022-04-29 NOTE — PT/OT/SLP EVAL
Occupational Therapy   Evaluation    Name: Usama Lee  MRN: [id number]  Admitting Diagnosis:  Cervical myelopathy  Recent Surgery: Procedure(s) (LRB):  C3-C7 Laminectomy and Fusion (N/A) 1 Day Post-Op    Recommendations:     Discharge Recommendations: home with home health  Discharge Equipment Recommendations:  none  Barriers to discharge:       Assessment:     Usama Lee is a 76 y.o. male with a medical diagnosis of Cervical myelopathy.  He presents with alert and agreeable to OT evaluation. Performance deficits affecting function: pain, impaired endurance, weakness, gait instability.      Rehab Prognosis: Good; patient would benefit from acute skilled OT services to address these deficits and reach maximum level of function.       Plan:     Patient to be seen   to address the above listed problems via    · Plan of Care Expires: 04/29/22  · Plan of Care Reviewed with: patient    Subjective     Chief Complaint: Pain in cervical spine- day 1 post op C3-C7 laminectomy and fusion  Patient/Family Comments/goals: To return home today    Occupational Profile:  Living Environment: Pt lives alone   Previous level of function: independent with all ADLs/IADLs, and functional mobility  Roles and Routines: Pt takes care of himself, his home and his yard  Equipment Used at Home:  rollator, cane, straight (shower with a built in seat)  Assistance upon Discharge: Pt will have home health care and limited family assistance    Pain/Comfort:  · Pain Rating 1: 4/10  · Location 1: cervical spine  · Pain Addressed 1: Reposition, Distraction, Cessation of Activity, Pre-medicate for activity  · Pain Rating Post-Intervention 1: 3/10    Patients cultural, spiritual, Nondenominational conflicts given the current situation: no    Objective:     Communicated with: FABIOLA Gonzalez prior to session.  Patient found HOB elevated with hemovac, peripheral IV, SCD upon OT entry to room.    General Precautions: Standard, fall   Orthopedic  Precautions:spinal precautions   Braces: N/A  Respiratory Status: Room air    Occupational Performance:    Bed Mobility:    · Patient completed Supine to Sit with contact guard assistance    Functional Mobility/Transfers:  · Patient completed Sit <> Stand Transfer with contact guard assistance  with  rolling walker   · Patient completed Bed <> Chair Transfer using Step Transfer technique with supervision with rolling walker  · Patient completed Toilet Transfer Step Transfer technique with stand by assistance with  rolling walker  · Functional Mobility: Pt ambulated with SBA with RW, forward flexed posture, small steps.    Activities of Daily Living:  · Upper Body Dressing: independence with pull over shirt  · Lower Body Dressing: minimum assistance for pants, SBA for tennis shoes  · Toileting: stand by assistance      Cognitive/Visual Perceptual:  Cognitive/Psychosocial Skills:     -       Oriented to: Person, Place, Time and Situation   -       Follows Commands/attention:Follows two-step commands  -       Communication: clear/fluent  -       Safety awareness/insight to disability: intact   -       Mood/Affect/Coping skills/emotional control: Appropriate to situation, Cooperative and Pleasant    Physical Exam:  Balance:    -       sitting balance is good, dynamic standing balance is fair using RW for support  Motor Planning:    -       WFL  Dominant hand:    -       Right  Upper Extremity Range of Motion:     -       Right Upper Extremity: WFL  -       Left Upper Extremity: WFL  Upper Extremity Strength:    -       Right Upper Extremity: WFL  -       Left Upper Extremity: WFL   Strength:    -       Right Upper Extremity: WFL  -       Left Upper Extremity: WFL  Fine Motor Coordination:    -       Intact  Gross motor coordination:   WFL    AMPAC 6 Click ADL:  AMPAC Total Score: 21    Treatment & Education:  · Pt educated on OT role/POC.   · Importance of OOB activity with staff assistance.  · Importance of sitting  up in the chair throughout the day as tolerated, especially for meals   · Safety during functional t/f and mobility with use of RW  · Importance of assisting with self-care activities   · All questions/concerns answered within OT scope of practice    Education:    Patient left up in chair with call button in reach and RN Lisa notified    GOALS:   Multidisciplinary Problems     Occupational Therapy Goals        Problem: Occupational Therapy    Goal Priority Disciplines Outcome Interventions   Occupational Therapy Goal     OT, PT/OT Adequate for Care Transition    Description: STG:  Pt will perform grooming with setup  Pt will bathe with setup  Pt will perform UE dressing with setup  Pt will perform LE dressing with SBA and setup  Pt will transfer bed/chair/toilet with SBA with rollator  Pt will perform standing task x 3 min with SBA with rollator  Pt will tolerate 20 minutes of tx without fatigue      LT.Restore to max I with self care and mobility.                      History:     Past Medical History:   Diagnosis Date    Diabetes     Gout     Heart disease     High cholesterol     HTN (hypertension)     Parkinson disease     Sleep apnea        Past Surgical History:   Procedure Laterality Date    CERVICAL LAMINECTOMY WITH SPINAL FUSION N/A 2022    Procedure: C3-C7 Laminectomy and Fusion;  Surgeon: Charbel Blake MD;  Location: [facility];  Service: Neurosurgery;  Laterality: N/A;  Neuro monitoring    FLEXOR TENDON REPAIR Left 2021    Procedure: REPAIR, TENDON, FLEXOR;  Surgeon: Daniel Dempsey MD;  Location: [facility];  Service: Orthopedics;  Laterality: Left;    TOTAL KNEE ARTHROPLASTY Right     TRIGGER FINGER RELEASE Left 2021    Procedure: RELEASE, TRIGGER FINGER,RING;  Surgeon: Daniel Dempsey MD;  Location: [facility];  Service: Orthopedics;  Laterality: Left;       Time Tracking:     OT Date of Treatment: 22  OT Start Time: 918  OT Stop  Time: 0941  OT Total Time (min): 23 min    Billable Minutes:Evaluation low complexity    4/29/2022

## 2022-04-29 NOTE — SUBJECTIVE & OBJECTIVE
Interval History: No acute events overnight. Pt up in chair    Review of Systems   Constitutional:  Positive for activity change. Negative for appetite change, chills, diaphoresis and fever.   HENT:  Negative for trouble swallowing.    Eyes:  Negative for visual disturbance.   Respiratory:  Negative for chest tightness and shortness of breath.    Cardiovascular:  Negative for chest pain and palpitations.   Gastrointestinal:  Negative for abdominal pain, blood in stool, constipation, nausea and vomiting.   Genitourinary:  Negative for difficulty urinating, flank pain and hematuria.   Musculoskeletal:  Positive for arthralgias and gait problem.   Neurological:  Negative for dizziness, facial asymmetry, light-headedness, numbness and headaches.   All other systems reviewed and are negative.  Objective:     Vital Signs (Most Recent):  Temp: 97.9 °F (36.6 °C) (04/29/22 0727)  Pulse: 63 (04/29/22 0727)  Resp: 18 (04/29/22 0843)  BP: 132/65 (04/29/22 0727)  SpO2: 98 % (04/29/22 0727) Vital Signs (24h Range):  Temp:  [96.7 °F (35.9 °C)-97.9 °F (36.6 °C)] 97.9 °F (36.6 °C)  Pulse:  [60-83] 63  Resp:  [7-20] 18  SpO2:  [93 %-100 %] 98 %  BP: ()/(54-92) 132/65     Weight: 111.3 kg (245 lb 4.8 oz)  Body mass index is 34.21 kg/m².    Intake/Output Summary (Last 24 hours) at 4/29/2022 1002  Last data filed at 4/28/2022 1051  Gross per 24 hour   Intake 850 ml   Output --   Net 850 ml      Physical Exam  Vitals reviewed.   Constitutional:       General: He is not in acute distress.     Appearance: He is obese.   HENT:      Head: Normocephalic and atraumatic.      Nose: Nose normal.      Mouth/Throat:      Mouth: Mucous membranes are moist.   Eyes:      Extraocular Movements: Extraocular movements intact.      Pupils: Pupils are equal, round, and reactive to light.   Cardiovascular:      Rate and Rhythm: Normal rate and regular rhythm.      Pulses: Normal pulses.      Heart sounds: Normal heart sounds. No murmur  heard.  Pulmonary:      Effort: Pulmonary effort is normal. No respiratory distress.      Breath sounds: Normal breath sounds.   Abdominal:      Palpations: Abdomen is soft.      Tenderness: There is no abdominal tenderness. There is no guarding.   Musculoskeletal:         General: Normal range of motion.      Cervical back: Normal range of motion and neck supple. Tenderness present.   Skin:     General: Skin is warm.      Capillary Refill: Capillary refill takes less than 2 seconds.      Comments: dsg  d/i with minimal blood stain to posterior cervical region   Neurological:      General: No focal deficit present.      Mental Status: He is alert and oriented to person, place, and time.      GCS: GCS eye subscore is 4. GCS verbal subscore is 5. GCS motor subscore is 6.      Cranial Nerves: Cranial nerves are intact. No cranial nerve deficit.      Sensory: Sensation is intact. No sensory deficit.      Motor: Motor function is intact.   Psychiatric:         Mood and Affect: Mood normal.         Behavior: Behavior normal.       Significant Labs: All pertinent labs within the past 24 hours have been reviewed.    Significant Imaging: I have reviewed all pertinent imaging results/findings within the past 24 hours.

## 2022-04-29 NOTE — NURSING
0727 Pt resting in bed. VS stable. Gave cup of coffee. Assisted to edge of bed. No complaints or requests at this time. Call bell in reach.     1020 Pt sitting up in chair. PT assisted pt with getting dressed. No complaints or requests at this time. Call bell in reach.

## 2022-04-30 ENCOUNTER — HOSPITAL ENCOUNTER (EMERGENCY)
Facility: HOSPITAL | Age: 77
Discharge: HOME | End: 2022-04-30
Attending: EMERGENCY MEDICINE
Payer: MEDICARE

## 2022-04-30 VITALS
DIASTOLIC BLOOD PRESSURE: 75 MMHG | SYSTOLIC BLOOD PRESSURE: 135 MMHG | WEIGHT: 252 LBS | HEART RATE: 88 BPM | BODY MASS INDEX: 34.13 KG/M2 | RESPIRATION RATE: 18 BRPM | TEMPERATURE: 98 F | HEIGHT: 72 IN | OXYGEN SATURATION: 97 %

## 2022-04-30 DIAGNOSIS — G89.18 POSTOPERATIVE PAIN: ICD-10-CM
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
## 2022-04-30 DIAGNOSIS — M54.2 NECK PAIN: ICD-10-CM

## 2022-04-30 PROCEDURE — 99283 EMERGENCY DEPT VISIT LOW MDM: CPT

## 2022-04-30 PROCEDURE — 99282 PR EMERGENCY DEPT VISIT,LEVEL II: ICD-10-PCS | Mod: ,,, | Performed by: EMERGENCY MEDICINE

## 2022-04-30 PROCEDURE — 99282 EMERGENCY DEPT VISIT SF MDM: CPT | Mod: ,,, | Performed by: EMERGENCY MEDICINE

## 2022-05-01 NOTE — DISCHARGE INSTRUCTIONS
Continue pain medication as prescribed.  Return to emergency department for any worsening or further problems.  Follow up in clinic with spine surgeon as scheduled.

## 2022-05-01 NOTE — ED PROVIDER NOTES
"Encounter Date: 4/30/2022    SCRIBE #1 NOTE: I, Claudia Harris, am scribing for, and in the presence of,  Mark Rhodes. I have scribed the entire note.       History     Chief Complaint   Patient presents with    Fall     The patient is a 76 year old male with complaints of a fall. The patient has neck surgery with  on Thursday to fix a nerve issue. On Saturday he was attempting to  some dog food when his dog caused him to fall backwards, hitting his head and back on the concrete floor. He reports that he remembers everything and lost consciousness for a couple seconds. He states since the fall he has been experiencing an intermittent headache and pain when standing or moving that he describes as "stinging" feeling in his neck, back, and arms. He denies any loss of urinary retention or numbness. The patient has taken 8 oxycodone in approximately the last 24 hours due to increased pain from the fall.     The history is provided by the patient and a relative. No  was used.     Review of patient's allergies indicates:   Allergen Reactions    Lovastatin      Other reaction(s): Muscle pain     Past Medical History:   Diagnosis Date    Diabetes     Gout     Heart disease     High cholesterol     HTN (hypertension)     Parkinson disease     Sleep apnea      Past Surgical History:   Procedure Laterality Date    CERVICAL LAMINECTOMY WITH SPINAL FUSION N/A 4/28/2022    Procedure: C3-C7 Laminectomy and Fusion;  Surgeon: Charbel Balke MD;  Location: [facility] OR;  Service: Neurosurgery;  Laterality: N/A;  Neuro monitoring    FLEXOR TENDON REPAIR Left 8/19/2021    Procedure: REPAIR, TENDON, FLEXOR;  Surgeon: Daniel Dempsey MD;  Location: [facility] OR;  Service: Orthopedics;  Laterality: Left;    TOTAL KNEE ARTHROPLASTY Right     TRIGGER FINGER RELEASE Left 8/19/2021    Procedure: RELEASE, TRIGGER FINGER,RING;  Surgeon: Daniel Dempsey MD;  Location: [facility] OR; "  Service: Orthopedics;  Laterality: Left;     History reviewed. No pertinent family history.  Social History     Tobacco Use    Smoking status: Never Smoker    Smokeless tobacco: Never Used   Substance Use Topics    Alcohol use: Not Currently     Review of Systems   Musculoskeletal: Positive for neck pain.        Neck, upper back, and bilateral arm pain   Neurological: Positive for headaches. Negative for numbness.   All other systems reviewed and are negative.      Physical Exam     Initial Vitals [04/30/22 1931]   BP Pulse Resp Temp SpO2   135/75 88 18 98.3 °F (36.8 °C) 97 %      MAP       --         Physical Exam    Constitutional: He appears well-developed and well-nourished.   HENT:   Head: Normocephalic and atraumatic.   Eyes: Conjunctivae and EOM are normal. Pupils are equal, round, and reactive to light.   Neck: Neck supple.   Normal range of motion.  Cardiovascular: Normal rate and regular rhythm.   Pulmonary/Chest: No respiratory distress.   Musculoskeletal:         General: Normal range of motion.      Cervical back: Normal range of motion and neck supple.     Neurological: He is alert and oriented to person, place, and time. He has normal strength.   Skin: Skin is warm and dry. Capillary refill takes less than 2 seconds.   Healing surgical incision along the posterior side of his neck. Tenderness is present around the site   Psychiatric: He has a normal mood and affect. Thought content normal.         ED Course   Procedures  Labs Reviewed - No data to display       Imaging Results          X-Ray Cervical Spine Complete 5 view (Final result)  Result time 04/30/22 20:24:15    Final result by Jerson Vicente DO (04/30/22 20:24:15)                 Impression:      As above.      Electronically signed by: Jerson Vicente  Date:    04/30/2022  Time:    20:24             Narrative:    EXAMINATION:  XR CERVICAL SPINE COMPLETE 5 VIEW    CLINICAL HISTORY:  Cervicalgia    TECHNIQUE:  XR CERVICAL SPINE  COMPLETE 5 VIEW    COMPARISON:  Comparisons were reviewed, if available.    FINDINGS:  Posterior spinal fusion hardware    Moderate to severe multilevel degenerative change    No acute fracture or dislocation                                 Medications - No data to display             Attending Attestation:           Physician Attestation for Scribe:  Physician Attestation Statement for Scribe #1: I, Mark Rhodes, reviewed documentation, as scribed by Claudia Harris in my presence, and it is both accurate and complete.                      Clinical Impression:   Final diagnoses:  [M54.2] Neck pain  [W19.XXXA] Fall, initial encounter (Primary)  [G89.18] Postoperative pain          ED Disposition Condition    Discharge Stable        ED Prescriptions     None        Follow-up Information    None          Mark Rhodes MD  04/30/22 5820

## 2022-05-02 RX ORDER — TRAMADOL HYDROCHLORIDE 50 MG/1
50 TABLET ORAL EVERY 6 HOURS PRN
Qty: 45 EACH | Refills: 0 | Status: SHIPPED | OUTPATIENT
Start: 2022-05-02 | End: 2022-06-08

## 2022-05-02 NOTE — TELEPHONE ENCOUNTER
"Returned call and Spoke with Cordell this am, He states that Mr. Lee is "talking out of his head" and he thinks he needs to go to swingbed. He thinks this behavior may be related to his pain medications or anesthesia and states that he has been like this since he had his surgery.He reports they went to ER Saturday after Mr. Lee had a fall at home. He states that Apryl has been making visits and are setting him home for home PT.     Dr. Martinez reviewed ER notes and scans. He agrees that we can try to get patient admitted to swing bed. Notified [facility] at home about plan for patient to go to swing bed. Received call from case management at [facility] stating that Hospitalist there wants patient to be admitted inpatient [facility] and evaluated by Dr. Martinez first before being admitted there for swingbed. I called and discussed this with Mr. Lee's son, and that Mr. Lee would need to come to ER here for evaluation, per . Cordell states that Mr. Lee has been doing much better this afternoon. And he wants to hold off on swing bed and ER. Dr. martinez has ordered different pain medication to see if this helps with patient's confusion and behavior. I spoke with [facility]-  Nurse that saw Mr. Lee today. She reports patient was stable with some mental confusion. they will be following up with patient tomorrow and starting PT to help with gait and safety training.  I discussed with Cordell to take Mr. Lee To ER immediately if he has any further decline in mental status. Discussed new prescription to be taken in place of percocet.  He verbalizes understanding.    ----- Message from Annemarie Schneider sent at 5/2/2022  8:38 AM CDT -----  Regarding: swing bed  SON CALLED AND SAID HE WANTS TO TRY AND GET HIS FATHER INTO SWING BED, IT DOESN'T APPEAR THAT HE IS STILL IN THE HOSPITAL.. HIS NUMBER -[phone]      "

## 2022-05-03 ENCOUNTER — HOSPITAL ENCOUNTER (EMERGENCY)
Facility: HOSPITAL | Age: 77
Discharge: PSYCHIATRIC OR SUBSTANCE USE FACILITY | End: 2022-05-03
Attending: FAMILY MEDICINE
Payer: MEDICARE

## 2022-05-03 VITALS
HEART RATE: 91 BPM | WEIGHT: 255 LBS | SYSTOLIC BLOOD PRESSURE: 110 MMHG | DIASTOLIC BLOOD PRESSURE: 90 MMHG | TEMPERATURE: 98 F | OXYGEN SATURATION: 90 % | RESPIRATION RATE: 16 BRPM | BODY MASS INDEX: 34.58 KG/M2

## 2022-05-03 DIAGNOSIS — Z00.8 MEDICAL CLEARANCE FOR PSYCHIATRIC ADMISSION: ICD-10-CM

## 2022-05-03 DIAGNOSIS — R44.1 VISUAL HALLUCINATIONS: Primary | ICD-10-CM

## 2022-05-03 LAB
ALBUMIN SERPL BCP-MCNC: 2.7 G/DL (ref 3.5–5)
ALBUMIN/GLOB SERPL: 0.8 {RATIO}
ALP SERPL-CCNC: 93 U/L (ref 45–115)
ALT SERPL W P-5'-P-CCNC: 9 U/L (ref 16–61)
AMPHET UR QL SCN: NEGATIVE
ANION GAP SERPL CALCULATED.3IONS-SCNC: 12 MMOL/L (ref 7–16)
APAP SERPL-MCNC: <2 ΜG/ML (ref 10–30)
AST SERPL W P-5'-P-CCNC: 22 U/L (ref 15–37)
BARBITURATES UR QL SCN: NEGATIVE
BASOPHILS # BLD AUTO: 0.01 K/UL (ref 0–0.2)
BASOPHILS NFR BLD AUTO: 0.1 % (ref 0–1)
BENZODIAZ METAB UR QL SCN: NEGATIVE
BILIRUB SERPL-MCNC: 1.1 MG/DL (ref 0–1.2)
BILIRUB UR QL STRIP: NEGATIVE
BUN SERPL-MCNC: 16 MG/DL (ref 7–18)
BUN/CREAT SERPL: 19 (ref 6–20)
CALCIUM SERPL-MCNC: 8.5 MG/DL (ref 8.5–10.1)
CANNABINOIDS UR QL SCN: NEGATIVE
CHLORIDE SERPL-SCNC: 94 MMOL/L (ref 98–107)
CLARITY UR: CLEAR
CO2 SERPL-SCNC: 33 MMOL/L (ref 21–32)
COCAINE UR QL SCN: NEGATIVE
COLOR UR: YELLOW
CREAT SERPL-MCNC: 0.85 MG/DL (ref 0.7–1.3)
DIFFERENTIAL METHOD BLD: ABNORMAL
EOSINOPHIL # BLD AUTO: 0.16 K/UL (ref 0–0.5)
EOSINOPHIL NFR BLD AUTO: 2.1 % (ref 1–4)
ERYTHROCYTE [DISTWIDTH] IN BLOOD BY AUTOMATED COUNT: 12.6 % (ref 11.5–14.5)
GLOBULIN SER-MCNC: 3.3 G/DL (ref 2–4)
GLUCOSE SERPL-MCNC: 184 MG/DL (ref 74–106)
GLUCOSE UR STRIP-MCNC: 500 MG/DL
HCT VFR BLD AUTO: 37.1 % (ref 40–54)
HGB BLD-MCNC: 12.9 G/DL (ref 13.5–18)
IMM GRANULOCYTES # BLD AUTO: 0.02 K/UL (ref 0–0.04)
IMM GRANULOCYTES NFR BLD: 0.3 % (ref 0–0.4)
KETONES UR STRIP-SCNC: NEGATIVE MG/DL
LEUKOCYTE ESTERASE UR QL STRIP: NEGATIVE
LYMPHOCYTES # BLD AUTO: 1.02 K/UL (ref 1–4.8)
LYMPHOCYTES NFR BLD AUTO: 13.4 % (ref 27–41)
MCH RBC QN AUTO: 31.4 PG (ref 27–31)
MCHC RBC AUTO-ENTMCNC: 34.8 G/DL (ref 32–36)
MCV RBC AUTO: 90.3 FL (ref 80–96)
MONOCYTES # BLD AUTO: 0.69 K/UL (ref 0–0.8)
MONOCYTES NFR BLD AUTO: 9.1 % (ref 2–6)
MPC BLD CALC-MCNC: 9.9 FL (ref 9.4–12.4)
NEUTROPHILS # BLD AUTO: 5.69 K/UL (ref 1.8–7.7)
NEUTROPHILS NFR BLD AUTO: 75 % (ref 53–65)
NITRITE UR QL STRIP: NEGATIVE
NRBC # BLD AUTO: 0 X10E3/UL
NRBC, AUTO (.00): 0 %
OPIATES UR QL SCN: NEGATIVE
PCP UR QL SCN: NEGATIVE
PH UR STRIP: 7 PH UNITS
PLATELET # BLD AUTO: 196 K/UL (ref 150–400)
POTASSIUM SERPL-SCNC: 3.5 MMOL/L (ref 3.5–5.1)
PROT SERPL-MCNC: 6 G/DL (ref 6.4–8.2)
PROT UR QL STRIP: NEGATIVE
RBC # BLD AUTO: 4.11 M/UL (ref 4.6–6.2)
RBC # UR STRIP: NEGATIVE /UL
SALICYLATES SERPL-MCNC: <1.7 MG/DL (ref 3–30)
SARS-COV-2 RDRP RESP QL NAA+PROBE: NEGATIVE
SODIUM SERPL-SCNC: 135 MMOL/L (ref 136–145)
SP GR UR STRIP: 1.01
UROBILINOGEN UR STRIP-ACNC: 2 MG/DL
WBC # BLD AUTO: 7.59 K/UL (ref 4.5–11)

## 2022-05-03 PROCEDURE — G0168 PR WOUND CLOSURE BY ADHESIVE: ICD-10-PCS | Mod: ,,, | Performed by: EMERGENCY MEDICINE

## 2022-05-03 PROCEDURE — 99285 EMERGENCY DEPT VISIT HI MDM: CPT | Mod: 25

## 2022-05-03 PROCEDURE — 80143 DRUG ASSAY ACETAMINOPHEN: CPT | Performed by: FAMILY MEDICINE

## 2022-05-03 PROCEDURE — 80307 DRUG TEST PRSMV CHEM ANLYZR: CPT | Mod: 59 | Performed by: FAMILY MEDICINE

## 2022-05-03 PROCEDURE — 85025 COMPLETE CBC W/AUTO DIFF WBC: CPT | Performed by: FAMILY MEDICINE

## 2022-05-03 PROCEDURE — 36415 COLL VENOUS BLD VENIPUNCTURE: CPT | Performed by: FAMILY MEDICINE

## 2022-05-03 PROCEDURE — 87635 SARS-COV-2 COVID-19 AMP PRB: CPT | Performed by: FAMILY MEDICINE

## 2022-05-03 PROCEDURE — 25000003 PHARM REV CODE 250: Performed by: FAMILY MEDICINE

## 2022-05-03 PROCEDURE — 99283 PR EMERGENCY DEPT VISIT,LEVEL III: ICD-10-PCS | Mod: 25,,, | Performed by: EMERGENCY MEDICINE

## 2022-05-03 PROCEDURE — 99283 EMERGENCY DEPT VISIT LOW MDM: CPT | Mod: 25,,, | Performed by: EMERGENCY MEDICINE

## 2022-05-03 PROCEDURE — 81003 URINALYSIS AUTO W/O SCOPE: CPT | Performed by: FAMILY MEDICINE

## 2022-05-03 PROCEDURE — 12011 RPR F/E/E/N/L/M 2.5 CM/<: CPT

## 2022-05-03 PROCEDURE — G0168 WOUND CLOSURE BY ADHESIVE: HCPCS | Mod: ,,, | Performed by: EMERGENCY MEDICINE

## 2022-05-03 PROCEDURE — 82077 ASSAY SPEC XCP UR&BREATH IA: CPT | Performed by: FAMILY MEDICINE

## 2022-05-03 PROCEDURE — 80053 COMPREHEN METABOLIC PANEL: CPT | Performed by: FAMILY MEDICINE

## 2022-05-03 PROCEDURE — 80307 DRUG TEST PRSMV CHEM ANLYZR: CPT | Performed by: FAMILY MEDICINE

## 2022-05-03 RX ORDER — QUETIAPINE FUMARATE 25 MG/1
25 TABLET, FILM COATED ORAL ONCE
Status: COMPLETED | OUTPATIENT
Start: 2022-05-03 | End: 2022-05-03

## 2022-05-03 RX ADMIN — QUETIAPINE 25 MG: 25 TABLET ORAL at 04:05

## 2022-05-03 NOTE — ED PROVIDER NOTES
Encounter Date: 5/3/2022       History     Chief Complaint   Patient presents with    Hallucinations     Ems from home - recent neck surg - increased pain meds - now had vision of bull chasing down momin     Patient presents to the ED after he fell while at home. He had a laminectomy of C3-C7 by Dr. Blake on 4/28/22. He has had an increase in his pain medication since the surgery. He has been having hallucinations for the past 3 days. According to EMS, this evening, he saw a bull chasing him down the momin and he fell while attempting to get out of the way. Mr. Lee says he was at the truck stop, changing a tire, when he slipped on some loose gravel and hit his head on the bumper of his truck. He does have a laceration to the right side of his head. His son says he this evening, he started seeing something getting him while he was lying in bed and rolled off the bed before he could catch him. He did hit his head on the bedside table. His son is requesting short term placement like a swing bed until he gets his medications sorted out.         Review of patient's allergies indicates:   Allergen Reactions    Lovastatin      Other reaction(s): Muscle pain     Past Medical History:   Diagnosis Date    Diabetes     Gout     Heart disease     High cholesterol     HTN (hypertension)     Parkinson disease     Sleep apnea      Past Surgical History:   Procedure Laterality Date    CERVICAL LAMINECTOMY WITH SPINAL FUSION N/A 4/28/2022    Procedure: C3-C7 Laminectomy and Fusion;  Surgeon: Charbel Blake MD;  Location: [facility] OR;  Service: Neurosurgery;  Laterality: N/A;  Neuro monitoring    FLEXOR TENDON REPAIR Left 8/19/2021    Procedure: REPAIR, TENDON, FLEXOR;  Surgeon: Daniel Dempsey MD;  Location: [facility] OR;  Service: Orthopedics;  Laterality: Left;    TOTAL KNEE ARTHROPLASTY Right     TRIGGER FINGER RELEASE Left 8/19/2021    Procedure: RELEASE, TRIGGER FINGER,RING;  Surgeon: Daniel ARZOLA  MD Roselyn;  Location: [facility] OR;  Service: Orthopedics;  Laterality: Left;     History reviewed. No pertinent family history.  Social History     Tobacco Use    Smoking status: Never Smoker    Smokeless tobacco: Never Used   Substance Use Topics    Alcohol use: Not Currently     Review of Systems   Musculoskeletal: Positive for neck pain.   Skin:        laceration   All other systems reviewed and are negative.      Physical Exam     Initial Vitals   BP Pulse Resp Temp SpO2   05/03/22 0251 05/03/22 0251 05/03/22 0255 05/03/22 0255 05/03/22 0251   136/66 78 16 97.8 °F (36.6 °C) (!) 94 %      MAP       --                Physical Exam    Vitals reviewed.  Constitutional: He appears well-developed and well-nourished.   HENT:   Head: Normocephalic.   Right Ear: External ear normal.   Left Ear: External ear normal.   Eyes: Pupils are equal, round, and reactive to light.   Neck:   Tender on palpation over cervical spine. Healing surgical wound to posterior neck.    Cardiovascular: Normal rate, regular rhythm and normal heart sounds.   Pulmonary/Chest: Breath sounds normal. No respiratory distress. He has no wheezes.   Abdominal: Abdomen is soft. Bowel sounds are normal. He exhibits no distension. There is no abdominal tenderness.     Neurological: He is alert and oriented to person, place, and time.   Skin:   1.5 cm superficial laceration to preauricular area of face.    Psychiatric: He has a normal mood and affect.         Medical Screening Exam   See Full Note    ED Course   Lac Repair    Date/Time: 5/3/2022 3:59 AM  Performed by: Rach Russo MD  Authorized by: Rach Russo MD     Consent:     Risks discussed:  Infection and pain  Universal protocol:     Patient identity confirmed:  Verbally with patient  Laceration details:     Location:  Face    Length (cm):  1.5  Exploration:     Hemostasis achieved with:  Direct pressure  Treatment:     Area cleansed with:  Saline  Skin repair:     Repair  method:  Tissue adhesive  Approximation:     Approximation:  Close  Repair type:     Repair type:  Simple  Post-procedure details:     Procedure completion:  Tolerated well, no immediate complications      Labs Reviewed   URINALYSIS, REFLEX TO URINE CULTURE - Abnormal; Notable for the following components:       Result Value    Glucose,   (*)     Urobilinogen, UA 2.0 (*)     All other components within normal limits   COMPREHENSIVE METABOLIC PANEL - Abnormal; Notable for the following components:    Sodium 135 (*)     Chloride 94 (*)     CO2 33 (*)     Glucose 184 (*)     Total Protein 6.0 (*)     Albumin 2.7 (*)     ALT 9 (*)     All other components within normal limits   SALICYLATE LEVEL - Abnormal; Notable for the following components:    Salicylate <1.7 (*)     All other components within normal limits   ACETAMINOPHEN LEVEL - Abnormal; Notable for the following components:    Acetaminophen <2 (*)     All other components within normal limits   CBC WITH DIFFERENTIAL - Abnormal; Notable for the following components:    RBC 4.11 (*)     Hemoglobin 12.9 (*)     Hematocrit 37.1 (*)     MCH 31.4 (*)     Neutrophils % 75.0 (*)     Lymphocytes % 13.4 (*)     Monocytes % 9.1 (*)     All other components within normal limits   SARS-COV-2 RNA AMPLIFICATION, QUAL - Normal    Narrative:     Negative SARS-CoV results should not be used as the sole basis for treatment or patient management decisions; negative results should be considered in the context of a patient's recent exposures, history and the presene of clinical signs and symptoms consistent with COVID-19.  Negative results should be treated as presumptive and confirmed by molecular assay, if necessary for patient management.   DRUG SCREEN, URINE (BEAKER) - Normal    Narrative:     The results of screening tests should be considered presumptive. Confirmatory testing is available upon request.    Cutoff Points:  PCP:         25ng/mL  AMPH:        500ng/mL  SHANELLE:         200ng/mL  BERTIN:        200ng/mL  THC:         50ng/mL  MOIZ:         300ng/mL  OPI:         2000ng/mL   CBC W/ AUTO DIFFERENTIAL    Narrative:     The following orders were created for panel order CBC auto differential.  Procedure                               Abnormality         Status                     ---------                               -----------         ------                     CBC with Differential[[id number]]        Abnormal            Final result                 Please view results for these tests on the individual orders.   ALCOHOL,MEDICAL (ETHANOL)          Imaging Results          CT Head Without Contrast (Final result)  Result time 05/03/22 07:04:33    Final result by Jerson Vicente DO (05/03/22 07:04:33)                 Impression:      No acute intracranial findings.      Electronically signed by: Jerson Vicente  Date:    05/03/2022  Time:    07:04             Narrative:    EXAMINATION:  CT HEAD WITHOUT CONTRAST    CLINICAL HISTORY:  fall;    TECHNIQUE:  Multiplanar CT imaging from the vertex to skull the skull base was performed without contrast.    COMPARISON:  Comparison is were reviewed, if available.    FINDINGS:  Encephalomalacia right occipital lobe.    There is no CT evidence of acute intracranial hemorrhage or large territorial infarct. No epidural/subdural hematomas.    There is no evidence of hydrocephalus, midline shift or mass effect.    Scalp, paranasal sinuses, and mastoid air cells are normal.                               CT Cervical Spine Without Contrast (Final result)  Result time 05/03/22 07:06:48    Final result by Jerson Vicente DO (05/03/22 07:06:48)                 Impression:      As above      Electronically signed by: Jerson Vicente  Date:    05/03/2022  Time:    07:06             Narrative:    EXAMINATION:  CT CERVICAL SPINE WITHOUT CONTRAST    CLINICAL HISTORY:  fall;    TECHNIQUE:  Multiplanar CT of the cervical spine without  contrast.    COMPARISON:  4/30/22    FINDINGS:  No acute trauma.  Posterior spinal fusion hardware in expected position.    Gas and soft tissue thickening at the posterior spinal fusion operative site, infection not excluded.  Correlate clinically                                 Medications   QUEtiapine tablet 25 mg (25 mg Oral Given 5/3/22 0408)     Medical Decision Making:   Initial Assessment:   PATIENT HAS BEEN MEDICALLY CLEARED FOR PSYCHIATRIC EVALUATION AND / OR TREATMENT.                     Clinical Impression:   Final diagnoses:  [R44.1] Visual hallucinations (Primary)  [Z00.8] Medical clearance for psychiatric admission          ED Disposition Condition    Transfer to Psych Facility         ED Prescriptions     None        Follow-up Information    None          Charli Swain MD  05/03/22 4188       Charli Swain MD  05/03/22 6720

## 2022-05-17 DIAGNOSIS — Z09 FOLLOW-UP EXAMINATION AFTER ORTHOPEDIC SURGERY: ICD-10-CM

## 2022-05-17 DIAGNOSIS — G95.9 CERVICAL MYELOPATHY: Primary | ICD-10-CM

## 2022-06-08 ENCOUNTER — OFFICE VISIT (OUTPATIENT)
Dept: INTERNAL MEDICINE | Facility: CLINIC | Age: 77
End: 2022-06-08
Payer: MEDICARE

## 2022-06-08 VITALS
OXYGEN SATURATION: 97 % | WEIGHT: 218 LBS | HEIGHT: 72 IN | RESPIRATION RATE: 18 BRPM | BODY MASS INDEX: 29.53 KG/M2 | TEMPERATURE: 99 F | DIASTOLIC BLOOD PRESSURE: 80 MMHG | HEART RATE: 79 BPM | SYSTOLIC BLOOD PRESSURE: 120 MMHG

## 2022-06-08 DIAGNOSIS — F32.A DEPRESSION, UNSPECIFIED DEPRESSION TYPE: ICD-10-CM

## 2022-06-08 DIAGNOSIS — E78.5 HYPERLIPIDEMIA LDL GOAL <100: ICD-10-CM

## 2022-06-08 DIAGNOSIS — M15.9 OSTEOARTHRITIS OF MULTIPLE JOINTS, UNSPECIFIED OSTEOARTHRITIS TYPE: ICD-10-CM

## 2022-06-08 DIAGNOSIS — G47.33 OSA (OBSTRUCTIVE SLEEP APNEA): ICD-10-CM

## 2022-06-08 DIAGNOSIS — E11.9 CONTROLLED TYPE 2 DIABETES MELLITUS WITHOUT COMPLICATION, WITHOUT LONG-TERM CURRENT USE OF INSULIN: ICD-10-CM

## 2022-06-08 DIAGNOSIS — G20.A1 PARKINSON DISEASE: ICD-10-CM

## 2022-06-08 DIAGNOSIS — I10 ESSENTIAL HYPERTENSION: Primary | ICD-10-CM

## 2022-06-08 PROCEDURE — 99214 OFFICE O/P EST MOD 30 MIN: CPT | Mod: S$PBB,,, | Performed by: INTERNAL MEDICINE

## 2022-06-08 PROCEDURE — 99214 PR OFFICE/OUTPT VISIT, EST, LEVL IV, 30-39 MIN: ICD-10-PCS | Mod: S$PBB,,, | Performed by: INTERNAL MEDICINE

## 2022-06-08 PROCEDURE — 99214 OFFICE O/P EST MOD 30 MIN: CPT | Mod: PBBFAC | Performed by: INTERNAL MEDICINE

## 2022-06-08 RX ORDER — CARVEDILOL 25 MG/1
25 TABLET ORAL
COMMUNITY
Start: 2022-02-11

## 2022-06-08 RX ORDER — UBIDECARENONE 75 MG
CAPSULE ORAL
COMMUNITY

## 2022-06-08 RX ORDER — POTASSIUM CHLORIDE 750 MG/1
10 TABLET, EXTENDED RELEASE ORAL DAILY
COMMUNITY
Start: 2022-05-27 | End: 2022-06-08 | Stop reason: SDUPTHER

## 2022-06-08 RX ORDER — DEXTROMETHORPHAN HYDROBROMIDE, GUAIFENESIN 5; 100 MG/5ML; MG/5ML
650 LIQUID ORAL DAILY
COMMUNITY

## 2022-06-08 RX ORDER — TAMSULOSIN HYDROCHLORIDE 0.4 MG/1
0.4 CAPSULE ORAL
COMMUNITY
Start: 2021-08-10 | End: 2023-10-16 | Stop reason: SDUPTHER

## 2022-06-08 RX ORDER — SERTRALINE HYDROCHLORIDE 50 MG/1
50 TABLET, FILM COATED ORAL DAILY
Qty: 90 TABLET | Refills: 3 | Status: ON HOLD | OUTPATIENT
Start: 2022-06-08 | End: 2023-08-01 | Stop reason: HOSPADM

## 2022-06-08 RX ORDER — GABAPENTIN 400 MG/1
400 CAPSULE ORAL
COMMUNITY
Start: 2022-02-11

## 2022-06-08 RX ORDER — POTASSIUM CHLORIDE 750 MG/1
10 TABLET, EXTENDED RELEASE ORAL DAILY
Qty: 90 TABLET | Refills: 3 | Status: SHIPPED | OUTPATIENT
Start: 2022-06-08

## 2022-06-08 RX ORDER — MEMANTINE HYDROCHLORIDE 10 MG/1
10 TABLET ORAL DAILY
Qty: 90 TABLET | Refills: 3 | Status: SHIPPED | OUTPATIENT
Start: 2022-06-08

## 2022-06-08 RX ORDER — SERTRALINE HYDROCHLORIDE 50 MG/1
50 TABLET, FILM COATED ORAL DAILY
COMMUNITY
Start: 2022-05-27 | End: 2022-06-08 | Stop reason: SDUPTHER

## 2022-06-08 RX ORDER — MEMANTINE HYDROCHLORIDE 10 MG/1
10 TABLET ORAL DAILY
COMMUNITY
Start: 2022-05-27 | End: 2022-06-08 | Stop reason: SDUPTHER

## 2022-06-08 NOTE — PROGRESS NOTES
Subjective:       Patient ID: Usama Lee is a 77 y.o. male.    Chief Complaint: Follow-up (Hospitalized in may-was in [facility] after that)    Rough 2 months  Now on the meds lost 30 pounds in 3 months      Follow-up  Pertinent negatives include no arthralgias, change in bowel habit, chest pain, fatigue or rash.     Review of Systems   Constitutional: Negative for appetite change, fatigue and unexpected weight change.   HENT: Negative for postnasal drip, trouble swallowing and goiter.    Eyes: Negative for visual disturbance.   Respiratory: Negative for apnea, choking and chest tightness.    Cardiovascular: Negative for chest pain and leg swelling.   Gastrointestinal: Negative for blood in stool, change in bowel habit and change in bowel habit.   Genitourinary: Negative for difficulty urinating and frequency.   Musculoskeletal: Negative for arthralgias, back pain and leg pain.   Integumentary:  Negative for rash.   Neurological: Negative for disturbances in coordination, memory loss and coordination difficulties.   Hematological: Negative for adenopathy.   Psychiatric/Behavioral: Negative for agitation. The patient is not hyperactive.          Objective:      Physical Exam  Vitals and nursing note reviewed.   Constitutional:       Appearance: Normal appearance. He is obese.   HENT:      Head: Normocephalic and atraumatic.      Right Ear: Tympanic membrane, ear canal and external ear normal.      Left Ear: Tympanic membrane, ear canal and external ear normal.      Nose: Nose normal.      Mouth/Throat:      Mouth: Mucous membranes are moist.      Pharynx: Oropharynx is clear.   Eyes:      Extraocular Movements: Extraocular movements intact.      Conjunctiva/sclera: Conjunctivae normal.      Pupils: Pupils are equal, round, and reactive to light.   Cardiovascular:      Rate and Rhythm: Normal rate and regular rhythm.      Pulses: Normal pulses.      Heart sounds: Normal heart sounds.   Pulmonary:      Effort:  Pulmonary effort is normal.      Breath sounds: Normal breath sounds.   Abdominal:      General: Abdomen is flat. Bowel sounds are normal.      Palpations: Abdomen is soft.   Musculoskeletal:         General: Normal range of motion.      Cervical back: Normal range of motion.   Skin:     General: Skin is warm and dry.      Capillary Refill: Capillary refill takes less than 2 seconds.   Neurological:      General: No focal deficit present.      Mental Status: He is alert and oriented to person, place, and time.   Psychiatric:         Mood and Affect: Mood normal.         Behavior: Behavior normal.         Thought Content: Thought content normal.         Judgment: Judgment normal.         Assessment:       1. Essential hypertension    2. Hyperlipidemia LDL goal <100    3. Controlled type 2 diabetes mellitus without complication, without long-term current use of insulin    4. Osteoarthritis of multiple joints, unspecified osteoarthritis type    5. Parkinson disease    6. TREVER (obstructive sleep apnea)    7. Depression, unspecified depression type        Plan:         Patient Instructions   Continue current tx  and f/u 4 weeks        Problem List Items Addressed This Visit        Neuro    Parkinson disease (Chronic)      Other Visit Diagnoses     Essential hypertension    -  Primary    Relevant Medications    potassium chloride (KLOR-CON) 10 MEQ TbSR    Hyperlipidemia LDL goal <100        Controlled type 2 diabetes mellitus without complication, without long-term current use of insulin        Osteoarthritis of multiple joints, unspecified osteoarthritis type        TREVER (obstructive sleep apnea)        Depression, unspecified depression type        Relevant Medications    memantine (NAMENDA) 10 MG Tab    sertraline (ZOLOFT) 50 MG tablet

## 2022-06-22 ENCOUNTER — HOSPITAL ENCOUNTER (OUTPATIENT)
Dept: RADIOLOGY | Facility: HOSPITAL | Age: 77
Discharge: HOME | End: 2022-06-22
Attending: ORTHOPAEDIC SURGERY
Payer: MEDICARE

## 2022-06-22 ENCOUNTER — OFFICE VISIT (OUTPATIENT)
Dept: SPINE | Facility: CLINIC | Age: 77
End: 2022-06-22
Payer: MEDICARE

## 2022-06-22 DIAGNOSIS — M50.30 DDD (DEGENERATIVE DISC DISEASE), CERVICAL: ICD-10-CM

## 2022-06-22 DIAGNOSIS — M54.16 LUMBAR RADICULOPATHY, CHRONIC: ICD-10-CM

## 2022-06-22 DIAGNOSIS — Z09 FOLLOW-UP EXAMINATION AFTER ORTHOPEDIC SURGERY: ICD-10-CM

## 2022-06-22 DIAGNOSIS — Z47.89 ENCOUNTER FOR ORTHOPEDIC FOLLOW-UP CARE: ICD-10-CM

## 2022-06-22 DIAGNOSIS — M48.02 SPINAL STENOSIS, CERVICAL REGION: Primary | ICD-10-CM

## 2022-06-22 PROCEDURE — 99214 OFFICE O/P EST MOD 30 MIN: CPT | Mod: PBBFAC | Performed by: ORTHOPAEDIC SURGERY

## 2022-06-22 PROCEDURE — 72040 X-RAY EXAM NECK SPINE 2-3 VW: CPT | Mod: TC

## 2022-06-22 PROCEDURE — 72040 XR CERVICAL SPINE AP LATERAL: ICD-10-PCS | Mod: 26,,, | Performed by: ORTHOPAEDIC SURGERY

## 2022-06-22 PROCEDURE — 72040 X-RAY EXAM NECK SPINE 2-3 VW: CPT | Mod: 26,,, | Performed by: ORTHOPAEDIC SURGERY

## 2022-06-22 PROCEDURE — 99024 PR POST-OP FOLLOW-UP VISIT: ICD-10-PCS | Mod: ,,, | Performed by: ORTHOPAEDIC SURGERY

## 2022-06-22 PROCEDURE — 99024 POSTOP FOLLOW-UP VISIT: CPT | Mod: ,,, | Performed by: ORTHOPAEDIC SURGERY

## 2022-06-22 NOTE — PROGRESS NOTES
AP, lateral views of the cervical spine reviewed    On the AP there is normal coronal alignment.  There is uncovertebral and facet hypertrophy seen.  On the lateral there is loss of cervical lordosis.  There are spondylotic changes noted with decrease in disc height and osteophyte formation seen.  Status post C3-C7 laminectomy and fusion.    Impression:  Degenerative changes of cervical spine as noted above

## 2022-06-22 NOTE — PROGRESS NOTES
MDM/time:  postop    ASSESSMENT:  77 y.o. male with cervical spondylosis and myelopathy now status post C3-C7 laminectomy and fusion 04/28/2022    PLAN:  Physical therapy.  Follow-up in 3 months    HPI:  77 y.o. male here for repeat evaluation of cervical spondylosis and myelopathy now status post C3-C7 laminectomy and fusion 04/28/2022.  Complaining of bilateral shoulder pain and neck stiffness    IMAGING:  X-ray cervical spine reviewed show:  On the AP there is normal coronal alignment.  There is uncovertebral and facet hypertrophy seen.  On the lateral there is loss of cervical lordosis.  There are spondylotic changes noted with decrease in disc height and osteophyte formation seen.  Status post C3-C7 laminectomy and fusion.      Past Medical History:   Diagnosis Date    Diabetes     Gout     Heart disease     High cholesterol     HTN (hypertension)     Parkinson disease     Sleep apnea      Past Surgical History:   Procedure Laterality Date    CERVICAL LAMINECTOMY WITH SPINAL FUSION N/A 4/28/2022    Procedure: C3-C7 Laminectomy and Fusion;  Surgeon: Charbel Blake MD;  Location: [facility] OR;  Service: Neurosurgery;  Laterality: N/A;  Neuro monitoring    FLEXOR TENDON REPAIR Left 8/19/2021    Procedure: REPAIR, TENDON, FLEXOR;  Surgeon: Daniel Dempsey MD;  Location: [facility] OR;  Service: Orthopedics;  Laterality: Left;    TOTAL KNEE ARTHROPLASTY Right     TRIGGER FINGER RELEASE Left 8/19/2021    Procedure: RELEASE, TRIGGER FINGER,RING;  Surgeon: Daniel Dempsey MD;  Location: [facility] OR;  Service: Orthopedics;  Laterality: Left;     Social History     Tobacco Use    Smoking status: Never Smoker    Smokeless tobacco: Never Used   Substance Use Topics    Alcohol use: Not Currently      Current Outpatient Medications   Medication Instructions    acetaminophen (TYLENOL) 650 mg, Oral, Daily, prn    allopurinoL (ZYLOPRIM) 300 MG tablet TAKE 1 TABLET(S) BY MOUTH DAILY     apixaban (ELIQUIS) 5 mg, 2 times daily    apixaban (ELIQUIS) 5 mg, Oral    aspirin (ECOTRIN) 81 MG EC tablet 1 tablet, Oral, Daily    atorvastatin (LIPITOR) 80 MG tablet TAKE ONE-HALF TABLET BY MOUTH DAILY FOR CHOLESTEROL. STOP MEDICATION AND CALL PROVIDER FOR ANY UNEXPLAINED MUSCLE ACHES,PAIN OR WEAKNESS    benztropine (COGENTIN) 1 MG tablet 1 tablet, Oral, Nightly    carbidopa-levodopa  mg (SINEMET)  mg per tablet 1 tablet, Oral, 3 times daily    carvediloL (COREG) 25 mg, Oral    cyanocobalamin (VITAMIN B-12) 500 MCG tablet 2 tablets    doxycycline (MONODOX) 100 MG capsule TAKE ONE CAPSULE BY MOUTH DAILY WITH FOOD    ferrous sulfate (FEOSOL) 325 mg (65 mg iron) Tab tablet Oral    fluticasone propionate (FLONASE) 50 mcg/actuation nasal spray INSTILL 1 SPRAY IN NOSTRIL(S) DAILY    gabapentin (NEURONTIN) 400 mg, Oral    glipiZIDE (GLUCOTROL) 5 mg, 2 times daily with meals    isosorbide mononitrate (IMDUR) 30 MG 24 hr tablet 1 tablet, Oral, Daily    memantine (NAMENDA) 10 mg, Oral, Daily    nicotine polacrilex 2 MG Lozg DISSOLVE 1 LOZENGE IN MOUTH EVERY 2 HOURS AS NEEDED FOR SMOKING CESSATION    nitroGLYCERIN (NITROSTAT) 0.4 MG SL tablet DISSOLVE ONE TABLET UNDER THE TONGUE EVERY 5 MINUTES AS NEEDED    pantoprazole (PROTONIX) 40 mg    potassium chloride (KLOR-CON) 10 MEQ TbSR 10 mEq, Oral, Daily    promethazine (PHENERGAN) 25 mg, Oral, Every 4 hours    sertraline (ZOLOFT) 50 mg, Oral, Daily    tamsulosin (FLOMAX) 0.4 mg, Oral    triamterene-hydrochlorothiazide 37.5-25 mg (DYAZIDE) 37.5-25 mg per capsule 1 capsule, Oral, Every morning        EXAM:  Constitutional  General Appearance:  There is no height or weight on file to calculate BMI., NAD  Psychiatric   Orientation: Oriented to time, oriented to place, oriented to person  Mood and Affect: Active and alert, normal mood, normal affect  Gait and Station   Appearance:  Normal gait, normal tandem gait, able to walk on toes, able to  walk on heels  Healed incision  5/5 strength  Sensation intact  2+ pulses

## 2022-07-05 ENCOUNTER — CLINICAL SUPPORT (OUTPATIENT)
Dept: REHABILITATION | Facility: HOSPITAL | Age: 77
End: 2022-07-05
Payer: MEDICARE

## 2022-07-05 DIAGNOSIS — Z47.89 ENCOUNTER FOR ORTHOPEDIC FOLLOW-UP CARE: ICD-10-CM

## 2022-07-05 PROCEDURE — 97162 PT EVAL MOD COMPLEX 30 MIN: CPT

## 2022-07-05 NOTE — PLAN OF CARE
[facility]  Physical Therapy Initial Evaluation    Name: Usama Lee  Clinic Number: [id number]    Therapy Diagnosis:   Encounter Diagnosis   Name Primary?    Encounter for orthopedic follow-up care      Physician: Charbel Blake MD    Physician Orders: PT Eval and Treat   Medical Diagnosis from Referral: Z47.89 Encounter for orthopedic follow-up care  Evaluation Date: 7/5/2022  Plan of Care Expiration: 8/19/2022  Visit # / Visits authorized: 1/ 12    Time In: 10:20 AM  Time Out: 10:45 AM  Total Appointment Time (timed & untimed codes): 25 minutes    Precautions: Standard    Subjective   Date of onset: Date of surgery 4/28/2022  History of current condition - Magen reports: mild pain in cervical spine having the most discomfort when turning his head to the left. He states he also has issues reaching up with his left hand and with reaching down to put on socks and shoes. He reports radicula symptoms when attempting to lift overhead. He states that after the surgery he had an episode by which he had to hospitalized for 30 days. He also tells us a fall he has had recently in which he hit his head attempting to take care of his dog outside. He reports radicular pain into 1st and 2nd digits.      Medical History:   Past Medical History:   Diagnosis Date    Diabetes     Gout     Heart disease     High cholesterol     HTN (hypertension)     Parkinson disease     Sleep apnea        Surgical History:   Usama Lee  has a past surgical history that includes Total knee arthroplasty (Right); Trigger finger release (Left, 8/19/2021); Flexor tendon repair (Left, 8/19/2021); and Cervical laminectomy with spinal fusion (N/A, 4/28/2022).    Medications:   Usama has a current medication list which includes the following prescription(s): acetaminophen, allopurinol, apixaban, apixaban, aspirin, atorvastatin, benztropine, carbidopa-levodopa  mg, carvedilol, cyanocobalamin, doxycycline,  ferrous sulfate, fluticasone propionate, gabapentin, glipizide, isosorbide mononitrate, memantine, nicotine polacrilex, nitroglycerin, pantoprazole, potassium chloride, promethazine, sertraline, tamsulosin, and triamterene-hydrochlorothiazide 37.5-25 mg.    Allergies:   Review of patient's allergies indicates:   Allergen Reactions    Lovastatin      Other reaction(s): Muscle pain        Imaging, AP, lateral views of the cervical spine reviewed     On the AP there is normal coronal alignment.  There is uncovertebral and facet hypertrophy seen.  On the lateral there is loss of cervical lordosis.  There are spondylotic changes noted with decrease in disc height and osteophyte formation seen.  Status post C3-C7 laminectomy and fusion.    Prior Therapy: Yes with good results  Social History:  lives alone but has a worker come to do household chores as needed.   Occupation: Retired  Prior Level of Function: Independent      Pain:  Current 5/10, worst 9/10, best 2/10   Location: cervical spine   Description: Aching  Aggravating Factors: turning to the left and raising his left arm overhead.   Easing Factors: pain medication        Objective     Posture:   Forward head: increased  Thoracic curve: decreased  Cervical curve: decreased  Laterally flexed: None  Rotated: none  Rounded shoulders: yes  Scoliosis: no  Shoulder height: right increased  Clavical height: right increased  Comments:    Cervical AROM:  FDB:   BB:   SBL: 30  SBR: 18  RL: 40  LL: 60    MMT Right  Left    C1-C2 Chin up  MMT strength: 3+/5 MMT strength: 3+/5   C1-C2 Chin in MMT shoulder: 3/5 MMT strength: 3/5   C3 Lateral flexion MMT strength: 4/5 MMT strength: 4/5   C4 Shoulder Shrug MMT strength: 5/5 MMT strength: 5/5   C5 Biceps MMT strength: 4+/5 MMT strength: 4+/5   C6 Wrist extension MMT strength: 4/5 MMT strength: 4+/5   C7 Triceps MMT strength: 4+/5 MMT strength: 4+/5   Other       Shoulder AROM Right  Left    Flexion (180) 175 140   Internal  rotation (45) 60 60   External rotation (45) 60 60   Abduction (180) 155 122   Other     Other     Other       Segment/Mobility:     Special test:    Compression test Positive   Thoracic outlet  Negative   Distraction Positive   Vertebral artery Negative   Alar ligament Negative   Scapular realignment Negative     Palpation Body side Positive/negative Increased tone   Sternocleidomastoid bilateral Negative no   Scalenes bilateral Negative no   1st rib bilateral Negative no   Upper traps bilateral Positive yes   Suboccipitals bilateral Positive yes   Transverse process bilateral Positive no   Spinous process bilateral Positive no   Pec Minor bilateral Negative no   Masseter bilateral Negative no   Mastoid process bilateral Negative no   Other      Comments        Other tests/information:  TU secs  DGI:     Limitation/Restriction for FOTO  Survey    Therapist reviewed FOTO scores for Usama Lee on 2022.   FOTO documents entered into EPIC - see Media section.    Limitation Score: 42%         Assessment   Usama is a 77 y.o. male referred to outpatient Physical Therapy with a medical diagnosis of s/p laminectomy C3-C7. Pt presents with weak cervical musculature, decreased ROM of both cervical spine and bilateral shoulders, radicular pain along the radial nerve distribution on LUE, difficulty with turning to check blind spots while driving, difficulty with ambulation with deficits concerning proprioception and kinesthetic sense, and repeated falls.     Pt prognosis is Good.   Pt will benefit from skilled outpatient Physical Therapy to address the deficits stated above and in the chart below, provide pt/family education, and to maximize pt's level of independence.     Plan of care discussed with patient: Yes  Pt's spiritual, cultural and educational needs considered and patient is agreeable to the plan of care and goals as stated below:     Anticipated Barriers for therapy: None      Goals:  Short  Term Goals: 3 weeks   1. Patient will demonstrate TUG score of 9 seconds without LOB or deviation to path with use of rollator  2. Patient will increase pain free ROM of cervical rotation to 60 degrees bilaterally  3. Patient will increase demonstrate G-/F+ dynamic standing balance to reduce overall fall risks    Long Term Goals: 6 weeks   1. Patient will demonstrate DGI score of 19/24  2. Patient will increase FOTO score by 10 points or more relating to function utilizing cervical spine.     Plan   Plan of care Certification: 7/5/2022 to 8/19/2022.    Outpatient Physical Therapy 2 times weekly for 6 weeks to include the following interventions: Manual Therapy, Moist Heat/ Ice, Neuromuscular Re-ed, Patient Education, Therapeutic Activities and Therapeutic Exercise.     Alber Fowler, PT

## 2022-07-07 DIAGNOSIS — D04.39 SQUAMOUS CELL CARCINOMA IN SITU (SCCIS) OF SKIN OF CHEEK: Primary | ICD-10-CM

## 2022-07-11 ENCOUNTER — CLINICAL SUPPORT (OUTPATIENT)
Dept: REHABILITATION | Facility: HOSPITAL | Age: 77
End: 2022-07-11
Payer: MEDICARE

## 2022-07-11 DIAGNOSIS — Z47.89 ENCOUNTER FOR ORTHOPEDIC FOLLOW-UP CARE: ICD-10-CM

## 2022-07-11 DIAGNOSIS — Z98.890 S/P LAMINECTOMY: Primary | ICD-10-CM

## 2022-07-11 PROCEDURE — 97110 THERAPEUTIC EXERCISES: CPT | Mod: CQ

## 2022-07-11 PROCEDURE — 97112 NEUROMUSCULAR REEDUCATION: CPT | Mod: CQ

## 2022-07-11 NOTE — PROGRESS NOTES
Physical Therapy Treatment Note     Name: Usama Lee  Clinic Number: [id number]    Therapy Diagnosis:s/p laminectomy C3-C7     Physician: Charbel Blake MD    Visit Date: 7/11/2022   Physician Orders: PT Eval and Treat   Medical Diagnosis from Referral: Z47.89 Encounter for orthopedic follow-up care  Evaluation Date: 7/5/2022  Plan of Care Expiration: 8/19/2022  Visit # / Visits authorized: 2/ 12  Authorization Period Expiration:   Plan of Care Certification Period: 07/05/2022 to 08/19/2022  Updated Plan of Care Due:   PTA Visit #: 1    Time In: 906  Time Out: 0950  Total Billable Time:  44   minutes    Precautions: pacemaker  Functional Level Prior to Evaluation:     Subjective     Pt reports: pain on L side of neck.  He was compliant with home exercise program.    Pain: 4/10  Location: left neck      Objective     Magen received therapeutic exercises to develop strength, endurance, ROM, flexibility and core stabilization for  29 minutes including:  Sci Fit Stepper x 5 min  Scapular retraction x 15  Levator stretch 5x 10 sec each  Shoulder raises x15  Cervical flexion/ext x 15 within pain free range  Cervical isometrics in all directions x 5 min        Magen participated in neuromuscular re-education activities to improve: Balance and Coordination for  15 minutes. The following activities were included:    BLE SLS // bars 1 min each with single hand hold intermittantly  BLE Tandem standing x 1 min each // bars  Cone taps alternating x 15 each // bars   Cone weaving with rollator x 10 cones  Cone step overs with rollator alternating BLEs x 20 ft          Home Exercises Provided and Patient Education Provided     Education provided: (next visit)    Written Home Exercises Provided: yes.  Exercises were reviewed and Magen was able to demonstrate them prior to the end of the session.  Magen demonstrated good  understanding of the education provided.     See EMR under Media for exercises provided  .    Assessment     Pt tolerated Tx well, therapist provided constant verbal and tactile cues for pt posture, cues for wider NILTON with standing, ambulation and activities to decrease fall risk.     Magen Is progressing well towards his goals.   Pt prognosis is Good.     Pt will continue to benefit from skilled outpatient physical therapy to address the deficits listed in the problem list box on initial evaluation, provide pt/family education and to maximize pt's level of independence in the home and community environment.     Pt's spiritual, cultural and educational needs considered and pt agreeable to plan of care and goals.     Anticipated barriers to physical therapy:     Goals:  Short Term Goals: 3 weeks   1. Patient will demonstrate TUG score of 9 seconds without LOB or deviation to path with use of rollator  2. Patient will increase pain free ROM of cervical rotation to 60 degrees bilaterally  3. Patient will increase demonstrate G-/F+ dynamic standing balance to reduce overall fall risks     Long Term Goals: 6 weeks   1. Patient will demonstrate DGI score of 19/24  2. Patient will increase FOTO score by 10 points or more relating to function utilizing cervical spine.         Plan     Pt will continue with POC.    Magdalena Jacob, PTA  7/11/2022

## 2022-07-14 ENCOUNTER — CLINICAL SUPPORT (OUTPATIENT)
Dept: REHABILITATION | Facility: HOSPITAL | Age: 77
End: 2022-07-14
Payer: MEDICARE

## 2022-07-14 DIAGNOSIS — Z98.890 S/P LAMINECTOMY: Primary | ICD-10-CM

## 2022-07-14 DIAGNOSIS — M54.16 LUMBAR RADICULOPATHY, CHRONIC: ICD-10-CM

## 2022-07-14 PROCEDURE — 97112 NEUROMUSCULAR REEDUCATION: CPT | Mod: CQ

## 2022-07-14 PROCEDURE — 97110 THERAPEUTIC EXERCISES: CPT | Mod: CQ

## 2022-07-14 NOTE — PROGRESS NOTES
Physical Therapy Treatment Note     Name: Usama Lee  Clinic Number: [id number]    Therapy Diagnosis:s/p laminectomy C3-C7     Physician: Charbel Blake MD    Visit Date: 7/14/2022   Physician Orders: PT Eval and Treat   Medical Diagnosis from Referral: Z47.89 Encounter for orthopedic follow-up care  Evaluation Date: 7/5/2022  Plan of Care Expiration: 8/19/2022  Visit # / Visits authorized: 3/ 12  Authorization Period Expiration:   Plan of Care Certification Period: 07/05/2022 to 08/19/2022  Updated Plan of Care Due:   PTA Visit #: 2    Time In: 934  Time Out: 1014  Total Billable Time: 40   minutes    Precautions: pacemaker  Functional Level Prior to Evaluation:     Subjective     Pt reports: pain on L side of neck.  He was compliant with home exercise program.    Pain: 4/10  Location: left neck      Objective     Magen received therapeutic exercises to develop strength, endurance, ROM, flexibility and core stabilization for  25 minutes including:  Sci Fit Stepper x 5 min  Scapular retraction x 15  Levator stretch 5x 10 sec each  Shoulder rolls  x15 fwd/bwd  Chin tucks x 10 with 10 sec holds  Cervical flexion/ext x 15 within pain free range  Cervical isometrics in all directions x 5 min  HEP education           Magen participated in neuromuscular re-education activities to improve: Balance and Coordination for  15 minutes. The following activities were included:    BLE SLS // bars 1 min each x2 bouts, with single hand hold intermittantly on mini tramp  BLE Tandem standing x 1 min each // bars x2 bouts  Cone taps alternating x 15 each // bars   Cone weaving with rollator x 10 cones (not this visit)  Cone step overs with rollator alternating BLEs x 20 ft (not this visit)          Home Exercises Provided and Patient Education Provided     Education provided: yes, handout     Written Home Exercises Provided: yes.  Exercises were reviewed and Magen was able to demonstrate them prior to the end of the  session.  Magen demonstrated good  understanding of the education provided.     See EMR under Media for exercises provided .    Assessment     Pt tolerated Tx well, therapist provided constant verbal and tactile cues for pt posture, cues for wider NILTON with standing, ambulation and activities to decrease fall risk.     Magen Is progressing well towards his goals.   Pt prognosis is Good.     Pt will continue to benefit from skilled outpatient physical therapy to address the deficits listed in the problem list box on initial evaluation, provide pt/family education and to maximize pt's level of independence in the home and community environment.     Pt's spiritual, cultural and educational needs considered and pt agreeable to plan of care and goals.     Anticipated barriers to physical therapy:     Goals:  Short Term Goals: 3 weeks   1. Patient will demonstrate TUG score of 9 seconds without LOB or deviation to path with use of rollator  2. Patient will increase pain free ROM of cervical rotation to 60 degrees bilaterally  3. Patient will increase demonstrate G-/F+ dynamic standing balance to reduce overall fall risks     Long Term Goals: 6 weeks   1. Patient will demonstrate DGI score of 19/24  2. Patient will increase FOTO score by 10 points or more relating to function utilizing cervical spine.         Plan     Pt will continue with POC.    Magdalena Jacob, PTA  7/14/2022

## 2022-07-20 ENCOUNTER — CLINICAL SUPPORT (OUTPATIENT)
Dept: REHABILITATION | Facility: HOSPITAL | Age: 77
End: 2022-07-20
Payer: MEDICARE

## 2022-07-20 DIAGNOSIS — Z98.890 S/P LAMINECTOMY: Primary | ICD-10-CM

## 2022-07-20 PROCEDURE — 97140 MANUAL THERAPY 1/> REGIONS: CPT

## 2022-07-20 PROCEDURE — 97112 NEUROMUSCULAR REEDUCATION: CPT

## 2022-07-20 PROCEDURE — 97110 THERAPEUTIC EXERCISES: CPT

## 2022-07-20 NOTE — PROGRESS NOTES
Physical Therapy Treatment Note     Name: Usama Lee  Clinic Number: [id number]    Therapy Diagnosis:s/p laminectomy C3-C7     Physician: Charbel Blake MD    Visit Date: 7/20/2022   Physician Orders: PT Eval and Treat   Medical Diagnosis from Referral: Z47.89 Encounter for orthopedic follow-up care  Evaluation Date: 7/5/2022  Plan of Care Expiration: 8/19/2022  Visit # / Visits authorized: 4/12  Authorization Period Expiration:   Plan of Care Certification Period: 07/05/2022 to 08/19/2022  Updated Plan of Care Due:   PTA Visit #: 0    Time In: 930  Time Out: 1010  Total Billable Time: 40   minutes    Precautions: pacemaker  Functional Level Prior to Evaluation:     Subjective     Pt reports: absence of pain in cervical spine.  He was compliant with home exercise program.    Pain: 0/10  Location: left neck      Objective     Magen received therapeutic exercises to develop strength, endurance, ROM, flexibility and core stabilization for  25 minutes including:  Sci Fit Stepper x 7 min  Scapular retraction x 15  Levator stretch 5x 10 sec each  Shoulder rolls  x15 fwd/bwd  Cervical flexion/ext x 15 within pain free range  Cervical isometrics in all directions x 5 min  HEP education     Magen participated in neuromuscular re-education activities to improve: Balance and Coordination for  15 minutes. The following activities were included:    BLE SLS // bars 1 min each x2 bouts, with single hand hold intermittantly on mini tramp  BLE Tandem standing x 1 min each // bars x2 bouts  Cone taps alternating x 15 each // bars   Cone weaving with rollator x 10 cones (not this visit)  Cone step overs with rollator alternating BLEs x 20 ft (not this visit)          Home Exercises Provided and Patient Education Provided     Education provided: yes, handout     Written Home Exercises Provided: yes.  Exercises were reviewed and Magen was able to demonstrate them prior to the end of the session.  Magen demonstrated good   understanding of the education provided.         Assessment     Pt tolerated Tx well, therapist provided constant verbal and tactile cues for pt posture, cues for wider NILTON with standing, ambulation and activities to decrease fall risk. Patient demonstrates knee strategy during LOB prior to ankle strategy with small perturbations to balance due to weakness. HEP updated to address deficit.    Magen Is progressing well towards his goals.   Pt prognosis is Good.     Pt will continue to benefit from skilled outpatient physical therapy to address the deficits listed in the problem list box on initial evaluation, provide pt/family education and to maximize pt's level of independence in the home and community environment.     Pt's spiritual, cultural and educational needs considered and pt agreeable to plan of care and goals.     Anticipated barriers to physical therapy:     Goals:  Short Term Goals: 3 weeks   1. Patient will demonstrate TUG score of 9 seconds without LOB or deviation to path with use of rollator  2. Patient will increase pain free ROM of cervical rotation to 60 degrees bilaterally  3. Patient will increase demonstrate G-/F+ dynamic standing balance to reduce overall fall risks     Long Term Goals: 6 weeks   1. Patient will demonstrate DGI score of 19/24  2. Patient will increase FOTO score by 10 points or more relating to function utilizing cervical spine.         Plan     Pt will continue with POC.    Alber Fowler, PT  7/20/2022

## 2022-07-25 ENCOUNTER — CLINICAL SUPPORT (OUTPATIENT)
Dept: REHABILITATION | Facility: HOSPITAL | Age: 77
End: 2022-07-25
Payer: MEDICARE

## 2022-07-25 DIAGNOSIS — Z98.890 S/P LAMINECTOMY: Primary | ICD-10-CM

## 2022-07-25 PROCEDURE — 97110 THERAPEUTIC EXERCISES: CPT | Mod: CQ

## 2022-07-25 PROCEDURE — 97112 NEUROMUSCULAR REEDUCATION: CPT | Mod: CQ

## 2022-07-25 NOTE — PROGRESS NOTES
Physical Therapy Treatment Note     Name: Usama Lee  Clinic Number: [id number]    Therapy Diagnosis:s/p laminectomy C3-C7     Physician: Charbel Blake MD    Visit Date: 7/25/2022   Physician Orders: PT Eval and Treat   Medical Diagnosis from Referral: Z47.89 Encounter for orthopedic follow-up care  Evaluation Date: 7/5/2022  Plan of Care Expiration: 8/19/2022  Visit # / Visits authorized: 4/12  Authorization Period Expiration:   Plan of Care Certification Period: 07/05/2022 to 08/19/2022  Updated Plan of Care Due:   PTA Visit #: 1    Time In: 928  Time Out: 1012  Total Billable Time: 44   minutes    Precautions: pacemaker  Functional Level Prior to Evaluation:     Subjective     Pt reports: no cervical pain today, mainly my lower back this morning.    He was compliant with home exercise program.    Pain: 4/10  Location: in lower back.    Objective     Magen received therapeutic exercises to develop strength, endurance, ROM, flexibility and core stabilization for  29 minutes including:  Sci Fit Stepper x 7 min  Scapular retraction 2x10 elbows bent GTB  Levator stretch 5x 10 sec each  Shoulder rolls  x15 fwd/bwd  Cervical flexion/ext x 15 within pain free range  Cervical isometrics in all directions x 5 min (not this visit)  Bridging 1x15  SLRs 1x15     Trunk rotation 1x10 in hooklying    Magen participated in neuromuscular re-education activities to improve: Balance and Coordination for  15 minutes. The following activities were included:    BLE SLS // bars 1 min each x2 bouts, with single hand hold intermittantly on mini tramp (not this visit)  BLE Tandem standing x 1 min each // bars x2 bouts  Cone taps alternating x 15 each // bars   Cone weaving with rollator x 10 cones (not this visit)  Cone step overs with rollator alternating BLEs x 20 ft (not this visit)          Home Exercises Provided and Patient Education Provided     Education provided: yes, handout     Written Home Exercises Provided:  yes.  Exercises were reviewed and Magen was able to demonstrate them prior to the end of the session.  Magen demonstrated good  understanding of the education provided.         Assessment     Pt tolerated Tx well, therapist provided constant verbal and tactile cues for pt posture, cues for wider NILTON with standing, ambulation and activities to decrease fall risk.       Magen Is progressing well towards his goals.   Pt prognosis is Good.     Pt will continue to benefit from skilled outpatient physical therapy to address the deficits listed in the problem list box on initial evaluation, provide pt/family education and to maximize pt's level of independence in the home and community environment.     Pt's spiritual, cultural and educational needs considered and pt agreeable to plan of care and goals.     Anticipated barriers to physical therapy:     Goals:  Short Term Goals: 3 weeks   1. Patient will demonstrate TUG score of 9 seconds without LOB or deviation to path with use of rollator  2. Patient will increase pain free ROM of cervical rotation to 60 degrees bilaterally  3. Patient will increase demonstrate G-/F+ dynamic standing balance to reduce overall fall risks     Long Term Goals: 6 weeks   1. Patient will demonstrate DGI score of 19/24  2. Patient will increase FOTO score by 10 points or more relating to function utilizing cervical spine.         Plan     Pt will continue with POC.    Magdalena Jacob, PTA  7/25/2022

## 2022-07-27 ENCOUNTER — PROCEDURE VISIT (OUTPATIENT)
Dept: DERMATOLOGY | Facility: CLINIC | Age: 77
End: 2022-07-27
Payer: MEDICARE

## 2022-07-27 VITALS
HEIGHT: 72 IN | WEIGHT: 218 LBS | HEART RATE: 84 BPM | DIASTOLIC BLOOD PRESSURE: 70 MMHG | BODY MASS INDEX: 29.53 KG/M2 | SYSTOLIC BLOOD PRESSURE: 103 MMHG

## 2022-07-27 DIAGNOSIS — D04.39 SQUAMOUS CELL CARCINOMA IN SITU (SCCIS) OF SKIN OF CHEEK: ICD-10-CM

## 2022-07-27 DIAGNOSIS — D04.39 SQUAMOUS CELL CARCINOMA IN SITU (SCCIS) OF SKIN OF RIGHT CHEEK: Primary | ICD-10-CM

## 2022-07-27 PROCEDURE — 12052 PR INTERMED WOUND REPAIR FACE/EAR/EYELID/NOSE/LIP/MUC MEBR, 2.6 TO 5.0CM: ICD-10-PCS | Mod: 51,,, | Performed by: STUDENT IN AN ORGANIZED HEALTH CARE EDUCATION/TRAINING PROGRAM

## 2022-07-27 PROCEDURE — 17312: ICD-10-PCS | Mod: ,,, | Performed by: STUDENT IN AN ORGANIZED HEALTH CARE EDUCATION/TRAINING PROGRAM

## 2022-07-27 PROCEDURE — 17312 MOHS ADDL STAGE: CPT | Mod: ,,, | Performed by: STUDENT IN AN ORGANIZED HEALTH CARE EDUCATION/TRAINING PROGRAM

## 2022-07-27 PROCEDURE — 99499 UNLISTED E&M SERVICE: CPT | Mod: ,,, | Performed by: STUDENT IN AN ORGANIZED HEALTH CARE EDUCATION/TRAINING PROGRAM

## 2022-07-27 PROCEDURE — 12052 INTMD RPR FACE/MM 2.6-5.0 CM: CPT | Mod: 51,,, | Performed by: STUDENT IN AN ORGANIZED HEALTH CARE EDUCATION/TRAINING PROGRAM

## 2022-07-27 PROCEDURE — 17311 MOHS 1 STAGE H/N/HF/G: CPT | Mod: ,,, | Performed by: STUDENT IN AN ORGANIZED HEALTH CARE EDUCATION/TRAINING PROGRAM

## 2022-07-27 PROCEDURE — 99499 NO LOS: ICD-10-PCS | Mod: ,,, | Performed by: STUDENT IN AN ORGANIZED HEALTH CARE EDUCATION/TRAINING PROGRAM

## 2022-07-27 PROCEDURE — 17311: ICD-10-PCS | Mod: ,,, | Performed by: STUDENT IN AN ORGANIZED HEALTH CARE EDUCATION/TRAINING PROGRAM

## 2022-07-27 NOTE — PROGRESS NOTES
Mohs Surgery Operative Note    Patient name: Usama Lee  Date: 07/27/2022    Mohs accession #:   Previous dermpath accession #: [id number]  Location: right temple  Preop diagnosis:SCCIS  Postop diagnosis: Same  Mohs AUC score: 8  Number of stages: 4  Preop size: 1.0 x 0.5 cm  Postop size: 2.5 x 1.8 cm  Depth of defect: fat  Repair type: intermediate    Surgeon and Pathologist: DESIREE Colón MD     Indications for Mohs Surgery    Removal of the patient's tumor is complicated by the following clinical features: Clinical area critical for tissue conservation (Area M: cheeks, forehead, scalp, neck, jawline, pretibial surface) and Poorly-defined clinical tumor borders    Based on my medical judgement, Mohs surgery is the most appropriate treatment for this cancer compared to other treatments. I discussed alternative treatments to Mohs surgery and specifically discussed the risks and benefits of curettage, excision with permanent sections, and foregoing treatment. The rationale for Mohs was explained to the patient and consent was obtained. The risks, benefits and alternatives to therapy were discussed in detail. Specifically, the risks of infection, scarring, bleeding, prolonged wound healing, incomplete removal, allergy to anesthesia, nerve injury and recurrence were addressed. Prior to the procedure, the treatment site was clearly identified and confirmed by the patient. All components of Universal Protocol/PAUSE Rule completed. CARLA Colón MD operated in two distinct and integrated capacities as the surgeon and pathologist.    STAGE I:  The patient was placed on the operating table. The cancer was identified and outlined. The entire surgical field was prepped with chlorhexidine The surgical site was anesthetized using Lidocaine 1% with epinephrine 1:100,000 buffered with sodium bicarbonate 8.4% in a 1:10 ratio.    The area of clinically apparent tumor was debulked with a 2 mm curette. The layer of  tissue was then surgically excised using a #15 blade and was then transferred onto a specimen sheet maintaining the orientation of the specimen. Hemostasis was obtained using monopolar electrodesiccation. The wound site was then covered with a dressing while the tissue samples were processed for examination.    The specimen was oriented, mapped and divided. Each section was then inked and processed in the Mohs lab using the Mohs protocol and submitted for frozen section. The histopathologic sections were reviewed by the surgeon in conjunction with the reference map.     Total blocks: 1 Total slides: 2    Frozen section analysis revealed: residual tumor present on stage 1. Tumor was indicated in red on the reference map.    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 Using a similar technique to that described above, a thin layer of tissue was removed from all areas where tumor was visible on the previous stage. The tissue was again oriented, mapped, dyed, and processed as above. Histopathologic sections were reviewed in conjunction with the reference map.     Total blocks: 1 Total slides: 1    Residual tumor was identified and indicated in red on the reference map, identifying the location where further tissue excision was necessary.  Therefore, an additional stage of Mohs Micrographic surgery was deemed necessary. Tumor characteristics were the same as the first stage.    STAGE III:  The patient was prepped in the same fashion as the first stage. Using a similar technique to that described above, a thin layer of tissue was removed from all areas where tumor was visible on the previous  stage. The tissue was again oriented, mapped, dyed, and processed as above. Histopathologic sections were reviewed in conjunction with the reference map.     Total blocks: 1 Total slides: 1    Residual tumor was identified and indicated in red on the reference map, identifying the location where further tissue excision was necessary.  Therefore, an additional stage of Mohs Micrographic surgery was deemed necessary. Tumor characteristics were the same as the first stage.    STAGE IV:  The patient was prepped in the same fashion as the first stage. Using a similar technique to that described above, a thin layer of tissue was removed from all areas where tumor was visible on the previous stage. The tissue was again oriented, mapped, dyed, and processed as above. Histopathologic sections were reviewed in conjunction with the reference map.     Total blocks: 1 Total slides: 1    Frozen section analysis revealed: No additional tumor identified on microscopic examination by the surgeon. Histology: No malignant cells seen in the sections examined.    Additional Histologic Findings: None    REPAIR: Intermediate Closure    Primary Surgeon : DESIREE Colón MD  Repair Size: 3.4 cm  Sutures:  4-0 monocryl; 5-0 prolene     The defect was identified and a marking pen was used to plan the repair. The area was infiltrated with Lidocaine 1% with epinephrine 1:100,000 buffered with sodium bicarbonate 8.4% in a 1:10 ratio, prepped with chlorhexidine and draped with sterile towels.  The wound was debeveled and undermined widely. Cones were excised within relaxed skin tension lines on both sides of the defect. Hemostasis was obtained using monopolar electrodesiccation. The dermis and subcutaneous tissue were then approximated using buried vertical mattress sutures. Percutaneous simple running sutures were carefully placed for maximum eversion and meticulous wound edge approximation. Careful attention was paid to avoid distorting any  nearby free margins.  The wound was cleansed with saline and ointment was applied along the wound surface. A sterile pressure dressing was applied. Wound care instructions were given verbally and in writing. The patient left the operating suite in stable condition. Patient was informed that additional refinement of the resulting surgical scar may be used as a second stage of this reconstruction.    Armando Colón MD   Mohs Surgery/Dermatologic Oncology

## 2022-07-27 NOTE — PATIENT INSTRUCTIONS

## 2022-07-27 NOTE — PROGRESS NOTES
Mohs Surgery Consult Note    Usama Lee is a 77 y.o. male who is referred by Dr. Fagan for evaluation of a SCCIS on the right lateral cheek.     Recurrent skin cancer: No    Preoperative Risk Factors:  Current Anticoagulants: Yes Eliquis (MI) and asa 81 mg -07/27/22  Endocarditis / Rheumatic Fever hx: No  Immunocompromised: No  Prosthetic joint: Metal plates in neck 04/29/22, right knee replacement 7 years ago   Congenital heart defect: No  Prosthetic heart valve: No  Diabetic: Yes  Transplant: No  Pacemaker: Yes  Defibrillator:  No  Prior problem with local anesthesia: No  Tobacco History: Yes] former smoker  Clindamycin Allergy: No  Pregnant: no     Transmissible Diseases:  HIV No  Hepatitis B or C  No      Exam:  Limited skin exam is normal except for a SCCIS  located on the right lateral cheek  .    Pathologic Findings:  Accession # [id number]  Diagnosis: SCCIS     Assessment and Plan:  Treatment Options : The various treatment options for skin cancer removal were reviewed with the patient in detail. These include Mohs surgery with its high cure rate, excisional surgery, destructive treatment, radiation therapy, and various topical therapies.  Given the indications and high cure rate, the patient has agreed to proceed with Mohs.  Risks and Benefits : The rationale for Mohs was explained to the patient. The risks and benefits to therapy were discussed in detail. Specifically, the risks of infection, scarring, bleeding, dehiscence, hematoma, prolonged wound healing, incomplete removal, allergy to anesthesia, nerve injury, inability to clear the tumor, and recurrence were addressed. The treatment site was clearly identified and confirmed by the patient.    Plan:  Mohs Micrographic Surgery    Indication for Mohs: Clinical area critical for tissue conservation (Area M: cheeks, forehead, scalp, neck, jawline, pretibial surface) and Poorly-defined clinical tumor borders  Mohs AUC Score: 8   Proposed closure:  Linear  Indication for antibiotics: patient already takes doxycycline for rosacea     Armando Colón MD   Mohs Surgery/Dermatologic Oncology

## 2022-08-01 ENCOUNTER — CLINICAL SUPPORT (OUTPATIENT)
Dept: REHABILITATION | Facility: HOSPITAL | Age: 77
End: 2022-08-01
Payer: MEDICARE

## 2022-08-01 DIAGNOSIS — Z98.890 S/P LAMINECTOMY: Primary | ICD-10-CM

## 2022-08-01 DIAGNOSIS — M54.16 LUMBAR RADICULOPATHY, CHRONIC: ICD-10-CM

## 2022-08-01 PROCEDURE — 97110 THERAPEUTIC EXERCISES: CPT | Mod: CQ

## 2022-08-01 NOTE — PROGRESS NOTES
"  Physical Therapy Treatment Note     Name: Usama Lee  Clinic Number: [id number]    Therapy Diagnosis:s/p laminectomy C3-C7     Physician: Charbel Blake MD    Visit Date: 8/1/2022   Physician Orders: PT Eval and Treat   Medical Diagnosis from Referral: Z47.89 Encounter for orthopedic follow-up care  Evaluation Date: 7/5/2022  Plan of Care Expiration: 8/19/2022  Visit # / Visits authorized: 6/12  Authorization Period Expiration:   Plan of Care Certification Period: 07/05/2022 to 08/19/2022  Updated Plan of Care Due:   PTA Visit #: 2    Time In: 927  Time Out: 1001  Total Billable Time: 34  minutes    Precautions: pacemaker  Functional Level Prior to Evaluation:     Subjective     Pt reports: no cervical or lumbar pain, "my R knee hurts from when I had knee replacement".  He was compliant with home exercise program.    Pain: 0/10 in neck and back, 4/10 in R knee  Location: R knee.    Objective     Magen received therapeutic exercises to develop strength, endurance, ROM, flexibility and core stabilization for   34  minutes including:  Sci Fit Stepper x 8 min at level 3.3  Scapular retraction 2x15 elbows bent GTB  Levator stretch 5x 10 sec each (not this visit)  Shoulder rolls  x15 fwd/bwd  Cervical flexion/ext x 15 within pain free range  Cervical isometrics in all directions x 5 min (not this visit)  Bridging 1x15  SLRs 1x15  With 2#   Trunk rotation 1x15 in hooklying  Leg press #40 x30  Seated marches 2# 2x15   Hip ABD GTB in hooklying 2x15    (Not This visit)    Magen participated in neuromuscular re-education activities to improve: Balance and Coordination for  15 minutes. The following activities were included:    BLE SLS // bars 1 min each x2 bouts, with single hand hold intermittantly on mini tramp (not this visit)  BLE Tandem standing x 1 min each // bars x2 bouts  Cone taps alternating x 15 each // bars   Cone weaving with rollator x 10 cones (not this visit)  Cone step overs with rollator " alternating BLEs x 20 ft (not this visit)          Home Exercises Provided and Patient Education Provided     Education provided: yes, handout     Written Home Exercises Provided: yes.  Exercises were reviewed and Magen was able to demonstrate them prior to the end of the session.  Magen demonstrated good  understanding of the education provided.         Assessment     Pt tolerated Tx well, therapist provided constant verbal and tactile cues for pt posture, cues for wider NILTON with standing, ambulation and activities to decrease fall risk.       Magen Is progressing well towards his goals.   Pt prognosis is Good.     Pt will continue to benefit from skilled outpatient physical therapy to address the deficits listed in the problem list box on initial evaluation, provide pt/family education and to maximize pt's level of independence in the home and community environment.     Pt's spiritual, cultural and educational needs considered and pt agreeable to plan of care and goals.     Anticipated barriers to physical therapy:     Goals:  Short Term Goals: 3 weeks   1. Patient will demonstrate TUG score of 9 seconds without LOB or deviation to path with use of rollator  2. Patient will increase pain free ROM of cervical rotation to 60 degrees bilaterally  3. Patient will increase demonstrate G-/F+ dynamic standing balance to reduce overall fall risks     Long Term Goals: 6 weeks   1. Patient will demonstrate DGI score of 19/24  2. Patient will increase FOTO score by 10 points or more relating to function utilizing cervical spine.         Plan     Pt will continue with POC.    Magdalena Jacob, PTA  8/1/2022

## 2022-08-03 ENCOUNTER — CLINICAL SUPPORT (OUTPATIENT)
Dept: DERMATOLOGY | Facility: CLINIC | Age: 77
End: 2022-08-03
Payer: MEDICARE

## 2022-08-03 DIAGNOSIS — Z48.02 ENCOUNTER FOR REMOVAL OF SUTURES: Primary | ICD-10-CM

## 2022-08-03 PROCEDURE — 99024 PR POST-OP FOLLOW-UP VISIT: ICD-10-PCS | Mod: ,,, | Performed by: STUDENT IN AN ORGANIZED HEALTH CARE EDUCATION/TRAINING PROGRAM

## 2022-08-03 PROCEDURE — 99024 POSTOP FOLLOW-UP VISIT: CPT | Mod: ,,, | Performed by: STUDENT IN AN ORGANIZED HEALTH CARE EDUCATION/TRAINING PROGRAM

## 2022-08-03 NOTE — PROGRESS NOTES
Patient name: Usama Lee  Date: 07/27/2022     Mohs accession #:   Previous dermpath accession #: [id number]  Location: right temple  Preop diagnosis:SCCIS  Postop diagnosis: Same  Mohs AUC score: 8  Number of stages: 4  Preop size: 1.0 x 0.5 cm  Postop size: 2.5 x 1.8 cm  Depth of defect: fat  Repair type: intermediate       Sutures removed without complications.   No s/s of infection   No follow ups on file. He will follow up with Dr. Fagan for skin exam   Melonie Marte CMA

## 2022-08-08 PROBLEM — Z00.8 MEDICAL CLEARANCE FOR PSYCHIATRIC ADMISSION: Status: RESOLVED | Noted: 2021-09-01 | Resolved: 2022-08-08

## 2022-08-15 ENCOUNTER — CLINICAL SUPPORT (OUTPATIENT)
Dept: REHABILITATION | Facility: HOSPITAL | Age: 77
End: 2022-08-15
Payer: MEDICARE

## 2022-08-15 DIAGNOSIS — M54.16 LUMBAR RADICULOPATHY, CHRONIC: Primary | ICD-10-CM

## 2022-08-15 PROCEDURE — 97110 THERAPEUTIC EXERCISES: CPT

## 2022-08-15 NOTE — PLAN OF CARE
Reasons for Recertification of Therapy: Patient is progressing as indicated. He has however been in the hospital for the last week due to respiratory distress. He demonstrates marked weakness and will benefit from continued care. His pain has improved concerning his lumbar and thoracic spine but he still shows deficits with dynamic balance and obstacle negotiation. He has not reported a fall since his surgery.    Plan     Updated Certification Period: 8/15/2022 to 9/9/2022  Recommended Treatment Plan: 2 times per week for 3 weeks: Gait Training, Manual Therapy, Moist Heat/ Ice, Neuromuscular Re-ed, Patient Education, Therapeutic Activities and Therapeutic Exercise  Other Recommendations:     Alber Fowler, PT  8/15/2022      I CERTIFY THE NEED FOR THESE SERVICES FURNISHED UNDER THIS PLAN OF TREATMENT AND WHILE UNDER MY CARE.    Physician's comments:      Physician's Signature: ___________________________________________________

## 2022-08-15 NOTE — PROGRESS NOTES
Physical Therapy Treatment Note     Name: Usama Lee  Clinic Number: [id number]    Therapy Diagnosis:s/p laminectomy C3-C7     Physician: Charbel Blake MD    Visit Date: 8/15/2022   Physician Orders: PT Eval and Treat   Medical Diagnosis from Referral: Z47.89 Encounter for orthopedic follow-up care  Evaluation Date: 7/5/2022  Plan of Care Expiration: 8/19/2022  Visit # / Visits authorized: 7/12  Authorization Period Expiration:   Plan of Care Certification Period: 07/05/2022 to 08/19/2022  Updated Plan of Care Due:   PTA Visit #: 0    Time In: 9:30  Time Out: 10:11  Total Billable Time: 41   minutes    Precautions: pacemaker  Functional Level Prior to Evaluation:     Subjective     Pt reports: no cervical or lumbar pain,     Pain: 0/10 in neck and back, 0/10 in R knee  Location: R knee.    Objective     Magen received therapeutic exercises to develop strength, endurance, ROM, flexibility and core stabilization for   41  minutes including:  Sci Fit Stepper x 8 min at level 3.3  Scapular retraction 2x15 elbows bent GTB  Levator stretch 5x 10 sec each (not this visit)  Shoulder rolls  x15 fwd/bwd  Cervical flexion/ext x 15 within pain free range  Cervical isometrics in all directions x 5 min (not this visit)  Bridging x30 GYTB   SLRs 1x15  With 2#   Trunk rotation 1x15 in hooklying  Leg press #70 x30  Seated marches 2# 2x15   Hip ABD GTB in hooklying x30 GTB    (Not This visit)    Magen participated in neuromuscular re-education activities to improve: Balance and Coordination for  15 minutes. The following activities were included:    BLE SLS // bars 1 min each x2 bouts, with single hand hold intermittantly on mini tramp (not this visit)  BLE Tandem standing x 1 min each // bars x2 bouts  Cone taps alternating x 15 each // bars   Cone weaving with rollator x 10 cones (not this visit)  Cone step overs with rollator alternating BLEs x 20 ft (not this visit)          Home Exercises Provided and Patient  Education Provided     Education provided: yes, handout     Written Home Exercises Provided: yes.  Exercises were reviewed and Magen was able to demonstrate them prior to the end of the session.  Magen demonstrated good  understanding of the education provided.         Assessment     Pt tolerated Tx well, therapist provided constant verbal and tactile cues for pt posture, cues for wider NILTON with standing, ambulation and activities to decrease fall risk.       Magen Is progressing well towards his goals.   Pt prognosis is Good.     Pt will continue to benefit from skilled outpatient physical therapy to address the deficits listed in the problem list box on initial evaluation, provide pt/family education and to maximize pt's level of independence in the home and community environment.     Pt's spiritual, cultural and educational needs considered and pt agreeable to plan of care and goals.     Anticipated barriers to physical therapy:     Goals:  Short Term Goals: 3 weeks   1. Patient will demonstrate TUG score of 9 seconds without LOB or deviation to path with use of rollator  2. Patient will increase pain free ROM of cervical rotation to 60 degrees bilaterally  3. Patient will increase demonstrate G-/F+ dynamic standing balance to reduce overall fall risks     Long Term Goals: 6 weeks   1. Patient will demonstrate DGI score of 19/24  2. Patient will increase FOTO score by 10 points or more relating to function utilizing cervical spine.         Plan     Pt will continue with POC.    Alber Fowler, PT  8/15/2022

## 2022-08-17 ENCOUNTER — CLINICAL SUPPORT (OUTPATIENT)
Dept: REHABILITATION | Facility: HOSPITAL | Age: 77
End: 2022-08-17
Payer: MEDICARE

## 2022-08-17 DIAGNOSIS — M54.16 LUMBAR RADICULOPATHY, CHRONIC: Primary | ICD-10-CM

## 2022-08-17 PROCEDURE — 97110 THERAPEUTIC EXERCISES: CPT | Mod: CQ

## 2022-08-17 NOTE — PROGRESS NOTES
Physical Therapy Treatment Note     Name: Usama Lee  Clinic Number: [id number]    Therapy Diagnosis:s/p laminectomy C3-C7     Physician: Charbel Blake MD    Visit Date: 8/17/2022   Physician Orders: PT Eval and Treat   Medical Diagnosis from Referral: Z47.89 Encounter for orthopedic follow-up care  Evaluation Date: 7/5/2022  Plan of Care Expiration: 8/19/2022  Visit # / Visits authorized: 8/12  Authorization Period Expiration:   Plan of Care Certification Period: 07/05/2022 to 08/19/2022  Updated Plan of Care Due:   PTA Visit #: 1    Time In: 9:40  Time Out: 10:11  Total Billable Time: 31   minutes    Precautions: pacemaker  Functional Level Prior to Evaluation:     Subjective     Pt reports: no cervical or lumbar pain, pt c/o numbness in B hands.    Pain: 0/10 in neck and back, 0/10 in R knee  Location: R knee.    Objective     Magen received therapeutic exercises to develop strength, endurance, ROM, flexibility and core stabilization for  31  minutes including:  Sci Fit Stepper x 8 min at level 3.5  Scapular retraction 2x15 elbows bent BTB  Levator stretch 5x 10 sec each (not this visit)  Shoulder rolls  x15 fwd/bwd  Cervical flexion/ext x 15 within pain free range  Cervical isometrics in all directions x 5 min (not this visit)  Bridging x30 GYTB   SLRs 1x15  With 2#   Trunk rotation 2x10 in hooklying  Leg press #70 x30  Seated marches 2# 2x15   Hip ABD BTB in hooklying x30     (Not This visit)    Magen participated in neuromuscular re-education activities to improve: Balance and Coordination for  15 minutes. The following activities were included:    BLE SLS // bars 1 min each x2 bouts, with single hand hold intermittantly on mini tramp (not this visit)  BLE Tandem standing x 1 min each // bars x2 bouts  Cone taps alternating x 15 each // bars   Cone weaving with rollator x 10 cones (not this visit)  Cone step overs with rollator alternating BLEs x 20 ft (not this visit)          Home Exercises  Provided and Patient Education Provided     Education provided: yes, handout     Written Home Exercises Provided: yes.  Exercises were reviewed and Magen was able to demonstrate them prior to the end of the session.  Magen demonstrated good  understanding of the education provided.         Assessment     Pt tolerated Tx well, therapist provided constant verbal and tactile cues for pt posture, cues for wider NILTON with standing, ambulation and activities to decrease fall risk.       Magen Is progressing well towards his goals.   Pt prognosis is Good.     Pt will continue to benefit from skilled outpatient physical therapy to address the deficits listed in the problem list box on initial evaluation, provide pt/family education and to maximize pt's level of independence in the home and community environment.     Pt's spiritual, cultural and educational needs considered and pt agreeable to plan of care and goals.     Anticipated barriers to physical therapy:     Goals:  Short Term Goals: 3 weeks   1. Patient will demonstrate TUG score of 9 seconds without LOB or deviation to path with use of rollator  2. Patient will increase pain free ROM of cervical rotation to 60 degrees bilaterally  3. Patient will increase demonstrate G-/F+ dynamic standing balance to reduce overall fall risks     Long Term Goals: 6 weeks   1. Patient will demonstrate DGI score of 19/24  2. Patient will increase FOTO score by 10 points or more relating to function utilizing cervical spine.         Plan     Pt will continue with POC.    Magdalena Jacob, PTA  8/17/2022

## 2022-08-19 ENCOUNTER — OFFICE VISIT (OUTPATIENT)
Dept: INTERNAL MEDICINE | Facility: CLINIC | Age: 77
End: 2022-08-19
Payer: MEDICARE

## 2022-08-19 VITALS
HEIGHT: 72 IN | HEART RATE: 73 BPM | SYSTOLIC BLOOD PRESSURE: 120 MMHG | RESPIRATION RATE: 20 BRPM | OXYGEN SATURATION: 94 % | TEMPERATURE: 100 F | BODY MASS INDEX: 31.69 KG/M2 | WEIGHT: 234 LBS | DIASTOLIC BLOOD PRESSURE: 80 MMHG

## 2022-08-19 DIAGNOSIS — E11.9 CONTROLLED TYPE 2 DIABETES MELLITUS WITHOUT COMPLICATION, WITHOUT LONG-TERM CURRENT USE OF INSULIN: ICD-10-CM

## 2022-08-19 DIAGNOSIS — E78.5 HYPERLIPIDEMIA LDL GOAL <100: ICD-10-CM

## 2022-08-19 DIAGNOSIS — M15.9 OSTEOARTHRITIS OF MULTIPLE JOINTS, UNSPECIFIED OSTEOARTHRITIS TYPE: ICD-10-CM

## 2022-08-19 DIAGNOSIS — M10.9 GOUT, UNSPECIFIED CAUSE, UNSPECIFIED CHRONICITY, UNSPECIFIED SITE: ICD-10-CM

## 2022-08-19 DIAGNOSIS — H91.90 DEAFNESS, UNSPECIFIED LATERALITY: ICD-10-CM

## 2022-08-19 DIAGNOSIS — G20.A1 PARKINSON DISEASE: ICD-10-CM

## 2022-08-19 DIAGNOSIS — I10 ESSENTIAL HYPERTENSION: Primary | ICD-10-CM

## 2022-08-19 PROCEDURE — 99214 OFFICE O/P EST MOD 30 MIN: CPT | Mod: S$PBB,,, | Performed by: INTERNAL MEDICINE

## 2022-08-19 PROCEDURE — 99214 PR OFFICE/OUTPT VISIT, EST, LEVL IV, 30-39 MIN: ICD-10-PCS | Mod: S$PBB,,, | Performed by: INTERNAL MEDICINE

## 2022-08-19 PROCEDURE — 99215 OFFICE O/P EST HI 40 MIN: CPT | Mod: PBBFAC | Performed by: INTERNAL MEDICINE

## 2022-08-19 RX ORDER — CARVEDILOL 6.25 MG/1
6.25 TABLET ORAL 2 TIMES DAILY
COMMUNITY
Start: 2022-08-05

## 2022-08-19 RX ORDER — LEVOFLOXACIN 750 MG/1
750 TABLET ORAL DAILY
COMMUNITY
Start: 2022-08-05 | End: 2022-09-14

## 2022-08-19 RX ORDER — IVERMECTIN 10 MG/G
CREAM TOPICAL DAILY
COMMUNITY
Start: 2022-07-19

## 2022-08-19 RX ORDER — TRAZODONE HYDROCHLORIDE 100 MG/1
50 TABLET ORAL
COMMUNITY
Start: 2022-08-11

## 2022-08-19 RX ORDER — FAMOTIDINE 20 MG/1
20 TABLET, FILM COATED ORAL
COMMUNITY
Start: 2022-08-11

## 2022-08-19 RX ORDER — FUROSEMIDE 40 MG/1
40 TABLET ORAL 2 TIMES DAILY
Qty: 60 TABLET | Refills: 11 | Status: SHIPPED | OUTPATIENT
Start: 2022-08-19 | End: 2023-10-16 | Stop reason: SDUPTHER

## 2022-08-19 NOTE — PROGRESS NOTES
Subjective:       Patient ID: Usama Lee is a 77 y.o. male.    Chief Complaint: Follow-up ([facility] 8/3/22-pneumonia)    Swollen all week  And now worse  Cannot breathe  Only taking dyaxide and with intermittent a fib   Had pneumonia last month  Was in [place] 3 daus    Follow-up  Pertinent negatives include no arthralgias, change in bowel habit, chest pain, fatigue or rash.     Review of Systems   Constitutional: Negative for appetite change, fatigue and unexpected weight change.   HENT: Negative for postnasal drip, trouble swallowing and goiter.    Eyes: Negative for visual disturbance.   Respiratory: Negative for apnea, choking and chest tightness.    Cardiovascular: Negative for chest pain and leg swelling.   Gastrointestinal: Negative for blood in stool, change in bowel habit and change in bowel habit.   Genitourinary: Negative for difficulty urinating and frequency.   Musculoskeletal: Negative for arthralgias, back pain and leg pain.   Integumentary:  Negative for rash.   Neurological: Negative for disturbances in coordination, memory loss and coordination difficulties.   Hematological: Negative for adenopathy.   Psychiatric/Behavioral: Negative for agitation. The patient is not hyperactive.          Objective:      Physical Exam  Vitals and nursing note reviewed.   Constitutional:       Appearance: Normal appearance. He is obese.   HENT:      Head: Normocephalic and atraumatic.      Right Ear: Tympanic membrane, ear canal and external ear normal.      Left Ear: Tympanic membrane, ear canal and external ear normal.      Nose: Nose normal.      Mouth/Throat:      Mouth: Mucous membranes are moist.      Pharynx: Oropharynx is clear.   Eyes:      Extraocular Movements: Extraocular movements intact.      Conjunctiva/sclera: Conjunctivae normal.      Pupils: Pupils are equal, round, and reactive to light.   Cardiovascular:      Rate and Rhythm: Normal rate and regular rhythm.      Pulses: Normal  pulses.      Heart sounds: Normal heart sounds.   Pulmonary:      Effort: Pulmonary effort is normal.      Breath sounds: Normal breath sounds.   Abdominal:      General: Abdomen is flat. Bowel sounds are normal.      Palpations: Abdomen is soft.   Musculoskeletal:         General: Normal range of motion.      Cervical back: Normal range of motion.   Skin:     General: Skin is warm and dry.      Capillary Refill: Capillary refill takes less than 2 seconds.   Neurological:      General: No focal deficit present.      Mental Status: He is alert and oriented to person, place, and time.   Psychiatric:         Mood and Affect: Mood normal.         Behavior: Behavior normal.         Thought Content: Thought content normal.         Judgment: Judgment normal.         Assessment:       1. Essential hypertension    2. Hyperlipidemia LDL goal <100    3. Controlled type 2 diabetes mellitus without complication, without long-term current use of insulin    4. Osteoarthritis of multiple joints, unspecified osteoarthritis type    5. Gout, unspecified cause, unspecified chronicity, unspecified site    6. Deafness, unspecified laterality    7. Parkinson disease        Plan:         Patient Instructions   Take meds as directed and f/u 4 weeks        Problem List Items Addressed This Visit        Neuro    Parkinson disease (Chronic)      Other Visit Diagnoses     Essential hypertension    -  Primary    Relevant Medications    furosemide (LASIX) 40 MG tablet    Hyperlipidemia LDL goal <100        Controlled type 2 diabetes mellitus without complication, without long-term current use of insulin        Osteoarthritis of multiple joints, unspecified osteoarthritis type        Gout, unspecified cause, unspecified chronicity, unspecified site        Deafness, unspecified laterality

## 2022-08-29 ENCOUNTER — CLINICAL SUPPORT (OUTPATIENT)
Dept: REHABILITATION | Facility: HOSPITAL | Age: 77
End: 2022-08-29
Payer: MEDICARE

## 2022-08-29 DIAGNOSIS — Z98.890 S/P LAMINECTOMY: Primary | ICD-10-CM

## 2022-08-29 PROCEDURE — 97110 THERAPEUTIC EXERCISES: CPT

## 2022-08-29 NOTE — PROGRESS NOTES
Physical Therapy Treatment Note     Name: Usama Lee  Clinic Number: [id number]    Therapy Diagnosis:s/p laminectomy C3-C7     Physician: Charbel Blake MD    Visit Date: 8/29/2022   Physician Orders: PT Eval and Treat   Medical Diagnosis from Referral: Z47.89 Encounter for orthopedic follow-up care  Evaluation Date: 7/5/2022  Plan of Care Expiration: 8/19/2022  Visit # / Visits authorized: 9/12  Authorization Period Expiration:   Plan of Care Certification Period: 07/05/2022 to 08/19/2022  Updated Plan of Care Due:   PTA Visit #: 0    Time In: 9:25 AM  Time Out: 10:00 AM  Total Billable Time: 35  minutes    Precautions: pacemaker  Functional Level Prior to Evaluation:     Subjective     Pt reports: That he woke up Friday morning with pain and stiffness in cervical region, but lumbar feels okay.  Pt denies numbness in hands.  Pt reports that he saw his PCP last week regarding increased edema.  Pt reports that he finally got it off with diuretics.    Pain: 5/10   Location: Cervical    Objective     Magen received therapeutic exercises to develop strength, endurance, ROM, flexibility and core stabilization for 35 minutes including:  Sci Fit Stepper x 8 min at level 3.5  Scapular retraction 2x15 elbows bent BTB  Levator stretch 5x 10 sec each   Shoulder rolls  x15 fwd/bwd  Cervical flexion/ext/rotation x 15 within pain free range  Cervical isometrics in all directions x 3 min   Bridging x30 GYTB   SLRs 1x15  With 2# (not this visit)  Trunk rotation 2x10 in hooklying  Leg press #70 x30 (not this visit)  Seated marches 2# 2x15 (not this visit)  Hip ABD BTB in hooklying x30 (not this visit)    (Not This visit)    Magen participated in neuromuscular re-education activities to improve: Balance and Coordination for  15 minutes. The following activities were included:    BLE SLS // bars 1 min each x2 bouts, with single hand hold intermittantly on mini tramp (not this visit)  BLE Tandem standing x 1 min each //  bars x2 bouts  Cone taps alternating x 15 each // bars   Cone weaving with rollator x 10 cones (not this visit)  Cone step overs with rollator alternating BLEs x 20 ft (not this visit)          Home Exercises Provided and Patient Education Provided     Education provided: yes, handout     Written Home Exercises Provided: yes.  Exercises were reviewed and Magen was able to demonstrate them prior to the end of the session.  Magen demonstrated good  understanding of the education provided.         Assessment     Pt tolerated Tx well, therapist provided verbal and tactile cues for pt posture and to encourage correct technique with cervical ROM ex, improved foot clearance with ambulation and activities to decrease fall risk.  Pt limited for time this visit, therefore several ex were omitted this visit.      Magen Is progressing well towards his goals.   Pt prognosis is Good.     Pt will continue to benefit from skilled outpatient physical therapy to address the deficits listed in the problem list box on initial evaluation, provide pt/family education and to maximize pt's level of independence in the home and community environment.     Pt's spiritual, cultural and educational needs considered and pt agreeable to plan of care and goals.     Anticipated barriers to physical therapy:     Goals:  Short Term Goals: 3 weeks   1. Patient will demonstrate TUG score of 9 seconds without LOB or deviation to path with use of rollator  2. Patient will increase pain free ROM of cervical rotation to 60 degrees bilaterally  3. Patient will increase demonstrate G-/F+ dynamic standing balance to reduce overall fall risks     Long Term Goals: 6 weeks   1. Patient will demonstrate DGI score of 19/24  2. Patient will increase FOTO score by 10 points or more relating to function utilizing cervical spine.         Plan     Pt will continue with POC.    Chely Arriaga, PT  8/29/2022

## 2022-09-06 ENCOUNTER — CLINICAL SUPPORT (OUTPATIENT)
Dept: REHABILITATION | Facility: HOSPITAL | Age: 77
End: 2022-09-06
Payer: MEDICARE

## 2022-09-06 DIAGNOSIS — Z98.890 S/P LAMINECTOMY: Primary | ICD-10-CM

## 2022-09-06 PROCEDURE — 97110 THERAPEUTIC EXERCISES: CPT

## 2022-09-06 PROCEDURE — 97112 NEUROMUSCULAR REEDUCATION: CPT

## 2022-09-06 NOTE — PLAN OF CARE
Reasons for Recertification of Therapy: Patient has missed a considerable amount of time due to sickness and has had a mild decline in LE strength and dynamic balance. Continued care is required to address remaining functional deficits. Patient's pain and ROM in cervical spine has however improved.     Goals:  1. Patient will increase pain free ROM of cervical spine by 15% to reduce pain with checking blind spots while driving and looking up as if to reach for something overhead Achieved  2. Patient will increase Tinetti score to 23/28 to reduce overall fall risks. In Progress  3. Patient will increase DGI score to 18/24 In Progress  4. Patient will demonstrate 9 stands in 30 seconds unassisted from standard height chair demonstrating improved LE strength and endurance. In Progress    Plan     Updated Certification Period: 9/6/2022 to 10/7/2022  Recommended Treatment Plan: 2 times per week for 4 weeks: Gait Training, Manual Therapy, Moist Heat/ Ice, Neuromuscular Re-ed, Patient Education, Therapeutic Activities, and Therapeutic Exercise  Other Recommendations:     Alber Fowler, PT  9/6/2022      I CERTIFY THE NEED FOR THESE SERVICES FURNISHED UNDER THIS PLAN OF TREATMENT AND WHILE UNDER MY CARE.    Physician's comments:      Physician's Signature: ___________________________________________________

## 2022-09-06 NOTE — PROGRESS NOTES
Physical Therapy Treatment Note     Name: Usama Lee  Clinic Number: [id number]    Therapy Diagnosis:s/p laminectomy C3-C7     Physician: Charbel lBake MD    Visit Date: 9/6/2022   Physician Orders: PT Eval and Treat   Medical Diagnosis from Referral: Z47.89 Encounter for orthopedic follow-up care  Evaluation Date: 7/5/2022  Plan of Care Expiration: 8/19/2022  Visit # / Visits authorized: 9/12  Authorization Period Expiration:   Plan of Care Certification Period: 07/05/2022 to 08/19/2022  Updated Plan of Care Due:   PTA Visit #: 0    Time In: 9:25 AM  Time Out: 10:00 AM  Total Billable Time: 35  minutes    Precautions: pacemaker  Functional Level Prior to Evaluation:     Subjective     Pt reports: = Absence of pain upon arrival    Pain: 0/10   Location: Cervical    Objective     Magen received therapeutic exercises to develop strength, endurance, ROM, flexibility and core stabilization for 35 minutes including:  Sci Fit Stepper x 8 min at level 3.5  Scapular retraction 2x15 elbows bent BTB  Levator stretch 5x 10 sec each   Shoulder rolls  x15 fwd/bwd  Cervical flexion/ext/rotation x 15 within pain free range  Cervical isometrics in all directions x 3 min   Bridging x30 GYTB   SLRs 1x15  With 2# (not this visit)  Trunk rotation 2x10 in hooklying  Leg press #70 x30   Hamstring Curl/LAQ 40#/30# x30 each  Hip ABD BTB in hooklying x30 (not this visit)    (Not This visit)    Magen participated in neuromuscular re-education activities to improve: Balance and Coordination for  15 minutes. The following activities were included:    BLE SLS // bars 1 min each x2 bouts, with single hand hold intermittantly on mini tramp (not this visit)  BLE Tandem standing x 1 min each // bars x2 bouts  Cone taps alternating x 15 each // bars   Cone weaving with rollator x 10 cones (not this visit)  Cone step overs with rollator alternating BLEs x 20 ft (not this visit)          Home Exercises Provided and Patient Education  Provided     Education provided: yes, handout     Written Home Exercises Provided: yes.  Exercises were reviewed and Magen was able to demonstrate them prior to the end of the session.  Magen demonstrated good  understanding of the education provided.         Assessment     Pt tolerated Tx well and is progressing with skilled care.       Magen Is progressing well towards his goals.   Pt prognosis is Good.     Pt will continue to benefit from skilled outpatient physical therapy to address the deficits listed in the problem list box on initial evaluation, provide pt/family education and to maximize pt's level of independence in the home and community environment.     Pt's spiritual, cultural and educational needs considered and pt agreeable to plan of care and goals.     Anticipated barriers to physical therapy:     Goals:  Short Term Goals: 3 weeks   1. Patient will demonstrate TUG score of 9 seconds without LOB or deviation to path with use of rollator  2. Patient will increase pain free ROM of cervical rotation to 60 degrees bilaterally  3. Patient will increase demonstrate G-/F+ dynamic standing balance to reduce overall fall risks     Long Term Goals: 6 weeks   1. Patient will demonstrate DGI score of 19/24  2. Patient will increase FOTO score by 10 points or more relating to function utilizing cervical spine.         Plan     Pt will continue with POC.    Alber Fowler, PT  9/6/2022

## 2022-09-14 ENCOUNTER — OFFICE VISIT (OUTPATIENT)
Dept: INTERNAL MEDICINE | Facility: CLINIC | Age: 77
End: 2022-09-14
Payer: MEDICARE

## 2022-09-14 VITALS
HEART RATE: 86 BPM | BODY MASS INDEX: 29.53 KG/M2 | TEMPERATURE: 98 F | WEIGHT: 218 LBS | RESPIRATION RATE: 16 BRPM | OXYGEN SATURATION: 96 % | HEIGHT: 72 IN | SYSTOLIC BLOOD PRESSURE: 120 MMHG | DIASTOLIC BLOOD PRESSURE: 66 MMHG

## 2022-09-14 DIAGNOSIS — I10 ESSENTIAL HYPERTENSION: Primary | ICD-10-CM

## 2022-09-14 DIAGNOSIS — E11.9 CONTROLLED TYPE 2 DIABETES MELLITUS WITHOUT COMPLICATION, WITHOUT LONG-TERM CURRENT USE OF INSULIN: ICD-10-CM

## 2022-09-14 DIAGNOSIS — M15.9 OSTEOARTHRITIS OF MULTIPLE JOINTS, UNSPECIFIED OSTEOARTHRITIS TYPE: ICD-10-CM

## 2022-09-14 DIAGNOSIS — G20.A1 PARKINSON DISEASE: ICD-10-CM

## 2022-09-14 DIAGNOSIS — E78.5 HYPERLIPIDEMIA LDL GOAL <100: ICD-10-CM

## 2022-09-14 DIAGNOSIS — G47.33 OSA (OBSTRUCTIVE SLEEP APNEA): ICD-10-CM

## 2022-09-14 DIAGNOSIS — I48.0 PAROXYSMAL ATRIAL FIBRILLATION: ICD-10-CM

## 2022-09-14 PROCEDURE — 99214 OFFICE O/P EST MOD 30 MIN: CPT | Mod: S$PBB,,, | Performed by: INTERNAL MEDICINE

## 2022-09-14 PROCEDURE — 99214 PR OFFICE/OUTPT VISIT, EST, LEVL IV, 30-39 MIN: ICD-10-PCS | Mod: S$PBB,,, | Performed by: INTERNAL MEDICINE

## 2022-09-14 PROCEDURE — 99213 OFFICE O/P EST LOW 20 MIN: CPT | Mod: PBBFAC | Performed by: INTERNAL MEDICINE

## 2022-09-14 RX ORDER — GLIPIZIDE 10 MG/1
1 TABLET ORAL
COMMUNITY
End: 2022-11-09 | Stop reason: SDUPTHER

## 2022-09-14 RX ORDER — ATORVASTATIN CALCIUM 40 MG/1
1 TABLET, FILM COATED ORAL
Status: ON HOLD | COMMUNITY
End: 2023-08-01 | Stop reason: HOSPADM

## 2022-09-14 RX ORDER — NITROGLYCERIN 0.4 MG/1
TABLET SUBLINGUAL
Qty: 100 TABLET | Refills: 3 | Status: SHIPPED | OUTPATIENT
Start: 2022-09-14 | End: 2022-09-14 | Stop reason: SDUPTHER

## 2022-09-14 RX ORDER — NITROGLYCERIN 0.4 MG/1
TABLET SUBLINGUAL
Qty: 100 TABLET | Refills: 3 | Status: SHIPPED | OUTPATIENT
Start: 2022-09-14

## 2022-09-14 NOTE — PROGRESS NOTES
Subjective:       Patient ID: Usama Lee is a 77 y.o. male.    Chief Complaint: Follow-up    Doing better than any other time last year  bs up some  labs from [facility]    Follow-up  Pertinent negatives include no arthralgias, change in bowel habit, chest pain, fatigue or rash.   Review of Systems   Constitutional:  Negative for appetite change, fatigue and unexpected weight change.   HENT:  Negative for postnasal drip, trouble swallowing and goiter.    Eyes:  Negative for visual disturbance.   Respiratory:  Negative for apnea, choking and chest tightness.    Cardiovascular:  Negative for chest pain and leg swelling.   Gastrointestinal:  Negative for blood in stool, change in bowel habit and change in bowel habit.   Genitourinary:  Negative for difficulty urinating and frequency.   Musculoskeletal:  Negative for arthralgias, back pain and leg pain.   Integumentary:  Negative for rash.   Neurological:  Negative for coordination difficulties, memory loss and coordination difficulties.   Hematological:  Negative for adenopathy.   Psychiatric/Behavioral:  Negative for agitation. The patient is not hyperactive.        Objective:      Physical Exam  Vitals and nursing note reviewed.   Constitutional:       Appearance: Normal appearance. He is obese.   HENT:      Head: Normocephalic and atraumatic.      Right Ear: Tympanic membrane, ear canal and external ear normal.      Left Ear: Tympanic membrane, ear canal and external ear normal.      Nose: Nose normal.      Mouth/Throat:      Mouth: Mucous membranes are moist.      Pharynx: Oropharynx is clear.   Eyes:      Extraocular Movements: Extraocular movements intact.      Conjunctiva/sclera: Conjunctivae normal.      Pupils: Pupils are equal, round, and reactive to light.   Cardiovascular:      Rate and Rhythm: Normal rate and regular rhythm.      Pulses: Normal pulses.      Heart sounds: Normal heart sounds.   Pulmonary:      Effort: Pulmonary effort is normal.       Breath sounds: Normal breath sounds.   Abdominal:      General: Abdomen is flat. Bowel sounds are normal.      Palpations: Abdomen is soft.   Musculoskeletal:         General: Normal range of motion.      Cervical back: Normal range of motion.   Skin:     General: Skin is warm and dry.      Capillary Refill: Capillary refill takes less than 2 seconds.   Neurological:      General: No focal deficit present.      Mental Status: He is alert and oriented to person, place, and time.   Psychiatric:         Mood and Affect: Mood normal.         Behavior: Behavior normal.         Thought Content: Thought content normal.         Judgment: Judgment normal.       Assessment:       1. Essential hypertension    2. Hyperlipidemia LDL goal <100    3. Controlled type 2 diabetes mellitus without complication, without long-term current use of insulin    4. Osteoarthritis of multiple joints, unspecified osteoarthritis type    5. Parkinson disease    6. Paroxysmal atrial fibrillation    7. TREVER (obstructive sleep apnea)          Plan:         Patient Instructions   Continue current meds and f/u 3 months      Problem List Items Addressed This Visit          Neuro    Parkinson disease (Chronic)     Other Visit Diagnoses       Essential hypertension    -  Primary    Hyperlipidemia LDL goal <100        Controlled type 2 diabetes mellitus without complication, without long-term current use of insulin        Relevant Medications    glipiZIDE (GLUCOTROL) 10 MG tablet    Osteoarthritis of multiple joints, unspecified osteoarthritis type        Paroxysmal atrial fibrillation        TREVER (obstructive sleep apnea)

## 2022-09-20 ENCOUNTER — CLINICAL SUPPORT (OUTPATIENT)
Dept: REHABILITATION | Facility: HOSPITAL | Age: 77
End: 2022-09-20
Payer: MEDICARE

## 2022-09-20 DIAGNOSIS — Z47.89 ENCOUNTER FOR ORTHOPEDIC FOLLOW-UP CARE: Primary | ICD-10-CM

## 2022-09-20 DIAGNOSIS — Z98.890 S/P LAMINECTOMY: Primary | ICD-10-CM

## 2022-09-20 PROCEDURE — 97110 THERAPEUTIC EXERCISES: CPT | Mod: CQ

## 2022-09-20 NOTE — PROGRESS NOTES
Physical Therapy Treatment Note     Name: Usama Lee  Clinic Number: [id number]    Therapy Diagnosis:s/p laminectomy C3-C7     Physician: Charbel Blake MD    Visit Date: 9/20/2022   Physician Orders: PT Eval and Treat   Medical Diagnosis from Referral: Z47.89 Encounter for orthopedic follow-up care  Evaluation Date: 7/5/2022  Plan of Care Expiration: 8/19/2022  Visit # / Visits authorized: 11/12  Authorization Period Expiration:   Plan of Care Certification Period: 07/05/2022 to 08/19/2022  Updated Plan of Care Due:   PTA Visit #: 1    Time In: 9:28 AM  Time Out:  10:00  AM  Total Billable Time:   32   minutes    Precautions: pacemaker  Functional Level Prior to Evaluation:     Subjective     Pt reports: = Absence of pain upon arrival, pt stated he just continues to experience weakness in both legs.     Pain: 0/10   Location: Cervical    Objective     Magen received therapeutic exercises to develop strength, endurance, ROM, flexibility and core stabilization for 32  minutes including:  Sci Fit Stepper x 8 min at level 4.0  Scapular retraction 2x20 elbows bent BTB  Levator stretch 5x 10 sec each   Shoulder rolls  x15 fwd/bwd  Cervical flexion/ext/rotation x 15 within pain free range (not this visit)  Cervical isometrics in all directions x 3 min (not this visit)  Bridging x30 GYTB   SLRs 1x15  With 3#   Trunk rotation 2x10 in hooklying  Leg press #70  2x20  Hamstring Curl/LAQ 50#/30# x30 each  Hip ABD BTB in hooklying x30 (not this visit)    (Not This visit)    Magen participated in neuromuscular re-education activities to improve: Balance and Coordination for  15 minutes. The following activities were included:    BLE SLS // bars 1 min each x2 bouts, with single hand hold intermittantly on mini tramp (not this visit)  BLE Tandem standing x 1 min each // bars x2 bouts  Cone taps alternating x 15 each // bars   Cone weaving with rollator x 10 cones (not this visit)  Cone step overs with rollator  alternating BLEs x 20 ft (not this visit)          Home Exercises Provided and Patient Education Provided     Education provided: yes, handout     Written Home Exercises Provided: yes.  Exercises were reviewed and Magen was able to demonstrate them prior to the end of the session.  Magen demonstrated good  understanding of the education provided.         Assessment     Pt tolerated Tx well and is progressing with skilled care. Pt presented  bouts of fatigue, requiring frequent rest breaks.       Magen Is progressing well towards his goals.   Pt prognosis is Good.     Pt will continue to benefit from skilled outpatient physical therapy to address the deficits listed in the problem list box on initial evaluation, provide pt/family education and to maximize pt's level of independence in the home and community environment.     Pt's spiritual, cultural and educational needs considered and pt agreeable to plan of care and goals.     Anticipated barriers to physical therapy:     Goals:  Short Term Goals: 3 weeks   1. Patient will demonstrate TUG score of 9 seconds without LOB or deviation to path with use of rollator  2. Patient will increase pain free ROM of cervical rotation to 60 degrees bilaterally  3. Patient will increase demonstrate G-/F+ dynamic standing balance to reduce overall fall risks     Long Term Goals: 6 weeks   1. Patient will demonstrate DGI score of 19/24  2. Patient will increase FOTO score by 10 points or more relating to function utilizing cervical spine.         Plan     Pt will continue with POC.    Magdalena Jacob, PTA  9/20/2022

## 2022-09-21 ENCOUNTER — HOSPITAL ENCOUNTER (OUTPATIENT)
Dept: RADIOLOGY | Facility: HOSPITAL | Age: 77
Discharge: HOME | End: 2022-09-21
Attending: ORTHOPAEDIC SURGERY
Payer: MEDICARE

## 2022-09-21 ENCOUNTER — OFFICE VISIT (OUTPATIENT)
Dept: SPINE | Facility: CLINIC | Age: 77
End: 2022-09-21
Payer: MEDICARE

## 2022-09-21 DIAGNOSIS — M50.30 DDD (DEGENERATIVE DISC DISEASE), CERVICAL: ICD-10-CM

## 2022-09-21 DIAGNOSIS — G95.9 CERVICAL MYELOPATHY: Primary | ICD-10-CM

## 2022-09-21 DIAGNOSIS — Z47.89 ENCOUNTER FOR ORTHOPEDIC FOLLOW-UP CARE: ICD-10-CM

## 2022-09-21 PROCEDURE — 99212 OFFICE O/P EST SF 10 MIN: CPT | Mod: PBBFAC | Performed by: ORTHOPAEDIC SURGERY

## 2022-09-21 PROCEDURE — 72040 XR CERVICAL SPINE AP LATERAL: ICD-10-PCS | Mod: 26,,, | Performed by: ORTHOPAEDIC SURGERY

## 2022-09-21 PROCEDURE — 72040 X-RAY EXAM NECK SPINE 2-3 VW: CPT | Mod: 26,,, | Performed by: ORTHOPAEDIC SURGERY

## 2022-09-21 PROCEDURE — 99214 PR OFFICE/OUTPT VISIT, EST, LEVL IV, 30-39 MIN: ICD-10-PCS | Mod: S$PBB,,, | Performed by: ORTHOPAEDIC SURGERY

## 2022-09-21 PROCEDURE — 99214 OFFICE O/P EST MOD 30 MIN: CPT | Mod: S$PBB,,, | Performed by: ORTHOPAEDIC SURGERY

## 2022-09-21 PROCEDURE — 72040 X-RAY EXAM NECK SPINE 2-3 VW: CPT | Mod: TC

## 2022-09-22 ENCOUNTER — CLINICAL SUPPORT (OUTPATIENT)
Dept: REHABILITATION | Facility: HOSPITAL | Age: 77
End: 2022-09-22
Payer: MEDICARE

## 2022-09-22 DIAGNOSIS — Z98.890 S/P LAMINECTOMY: Primary | ICD-10-CM

## 2022-09-22 PROCEDURE — 97110 THERAPEUTIC EXERCISES: CPT

## 2022-09-22 PROCEDURE — 97112 NEUROMUSCULAR REEDUCATION: CPT

## 2022-09-22 NOTE — PROGRESS NOTES
Physical Therapy Treatment Note     Name: Usama Lee  Clinic Number: [id number]    Therapy Diagnosis:s/p laminectomy C3-C7     Physician: Charbel Blake MD    Visit Date: 9/22/2022   Physician Orders: PT Eval and Treat   Medical Diagnosis from Referral: Z47.89 Encounter for orthopedic follow-up care  Evaluation Date: 7/5/2022  Plan of Care Expiration: 8/19/2022  Visit # / Visits authorized: 12  Authorization Period Expiration:   Plan of Care Certification Period:   Updated Plan of Care Due:   PTA Visit #: 0    Time In: 9:20 AM  Time Out:  10:04  AM  Total Billable Time:   44   minutes    Precautions: pacemaker  Functional Level Prior to Evaluation:     Subjective     Pt reports: = Absence of pain upon arrival, pt stated he just continues to experience weakness in both legs.     Pain: 0/10   Location: Cervical    Objective     Magen received therapeutic exercises to develop strength, endurance, ROM, flexibility and core stabilization for 28  minutes including:  Sci Fit Stepper x 8 min at level 4.0  Scapular retraction 2x20 elbows bent BTB  Levator stretch 5x 10 sec each   Shoulder rolls  x15 fwd/bwd  Cervical flexion/ext/rotation x 15 within pain free range (not this visit)  Cervical isometrics in all directions x 3 min (not this visit)  Bridging x30 GYTB   SLRs 1x15  With 3#   Trunk rotation 2x10 in hooklying  Leg press #70  x30 DL SL x30   Hamstring Curl/LAQ 50#/30# x30 each  Hip ABD BTB in hooklying x30 (not this visit)    (Not This visit)    Magen participated in neuromuscular re-education activities to improve: Balance and Coordination for  15 minutes. The following activities were included:    BLE SLS // bars 1 min each x2 bouts, with single hand hold intermittantly on mini tramp (not this visit)  BLE Tandem standing x 1 min each // bars x2 bouts  Cone taps alternating x 15 each // bars   Cone weaving with rollator x 10 cones (not this visit)  Cone step overs with rollator alternating BLEs x 20 ft  (not this visit)          Home Exercises Provided and Patient Education Provided     Education provided: yes, handout     Written Home Exercises Provided: yes.  Exercises were reviewed and Magen was able to demonstrate them prior to the end of the session.  Magen demonstrated good  understanding of the education provided.         Assessment     Pt tolerated Tx well and is progressing with skilled care. Pt presented  bouts of fatigue, requiring frequent rest breaks.       Magen Is progressing well towards his goals.   Pt prognosis is Good.     Pt will continue to benefit from skilled outpatient physical therapy to address the deficits listed in the problem list box on initial evaluation, provide pt/family education and to maximize pt's level of independence in the home and community environment.     Pt's spiritual, cultural and educational needs considered and pt agreeable to plan of care and goals.     Anticipated barriers to physical therapy:     Goals:  Short Term Goals: 3 weeks   1. Patient will demonstrate TUG score of 9 seconds without LOB or deviation to path with use of rollator  2. Patient will increase pain free ROM of cervical rotation to 60 degrees bilaterally  3. Patient will increase demonstrate G-/F+ dynamic standing balance to reduce overall fall risks     Long Term Goals: 6 weeks   1. Patient will demonstrate DGI score of 19/24  2. Patient will increase FOTO score by 10 points or more relating to function utilizing cervical spine.         Plan     Pt will continue with POC.    Alber Fowler, PT  9/22/2022

## 2022-09-23 NOTE — PROGRESS NOTES
MDM/time:  Greater than 30 minutes spent on this encounter including 10 minutes reviewing imaging and notes, 15 minutes with the patient, 5 minutes documentation    ASSESSMENT:  77 y.o. male with cervical spondylosis and myelopathy now status post C3-C7 laminectomy and fusion 04/28/2022    PLAN:  Follow-up in 6 months    HPI:  77 y.o. male here for repeat evaluation of cervical spondylosis and myelopathy now status post C3-C7 laminectomy and fusion 04/28/2022.  He has completed physical therapy.  His neck is doing well.  Complaining of balance difficulty    IMAGING:  X-ray cervical spine reviewed show:  On the AP there is normal coronal alignment.  There is uncovertebral and facet hypertrophy seen.  On the lateral there is loss of cervical lordosis.  There are spondylotic changes noted with decrease in disc height and osteophyte formation seen.  Status post C3-C7 laminectomy and fusion.      Past Medical History:   Diagnosis Date    Diabetes     Gout     Heart disease     High cholesterol     HTN (hypertension)     Parkinson disease     Sleep apnea      Past Surgical History:   Procedure Laterality Date    CERVICAL LAMINECTOMY WITH SPINAL FUSION N/A 4/28/2022    Procedure: C3-C7 Laminectomy and Fusion;  Surgeon: Charbel Blake MD;  Location: [facility] OR;  Service: Neurosurgery;  Laterality: N/A;  Neuro monitoring    FLEXOR TENDON REPAIR Left 8/19/2021    Procedure: REPAIR, TENDON, FLEXOR;  Surgeon: Daniel Dempsey MD;  Location: [facility] OR;  Service: Orthopedics;  Laterality: Left;    TOTAL KNEE ARTHROPLASTY Right     TRIGGER FINGER RELEASE Left 8/19/2021    Procedure: RELEASE, TRIGGER FINGER,RING;  Surgeon: Daniel Dempsey MD;  Location: [facility] OR;  Service: Orthopedics;  Laterality: Left;     Social History     Tobacco Use    Smoking status: Never    Smokeless tobacco: Never   Substance Use Topics    Alcohol use: Not Currently      Current Outpatient Medications   Medication  Instructions    acetaminophen (TYLENOL) 650 mg, Oral, Daily, prn    allopurinoL (ZYLOPRIM) 300 MG tablet TAKE 1 TABLET(S) BY MOUTH DAILY    apixaban (ELIQUIS) 5 mg, 2 times daily    aspirin (ECOTRIN) 81 MG EC tablet 1 tablet, Oral, Daily    atorvastatin (LIPITOR) 40 MG tablet 1 tablet    atorvastatin (LIPITOR) 80 MG tablet TAKE ONE-HALF TABLET BY MOUTH DAILY FOR CHOLESTEROL. STOP MEDICATION AND CALL PROVIDER FOR ANY UNEXPLAINED MUSCLE ACHES,PAIN OR WEAKNESS    benztropine (COGENTIN) 1 MG tablet 1 tablet, Oral, Nightly    carbidopa-levodopa  mg (SINEMET)  mg per tablet 1 tablet, Oral, 3 times daily    carvediloL (COREG) 25 mg, Oral    carvediloL (COREG) 6.25 mg, Oral, 2 times daily    cyanocobalamin 500 MCG tablet 2 tablets    doxycycline (MONODOX) 100 MG capsule TAKE ONE CAPSULE BY MOUTH DAILY WITH FOOD    famotidine (PEPCID) 20 mg    ferrous sulfate (FEOSOL) 325 mg (65 mg iron) Tab tablet Oral    fluticasone propionate (FLONASE) 50 mcg/actuation nasal spray INSTILL 1 SPRAY IN NOSTRIL(S) DAILY    furosemide (LASIX) 40 mg, Oral, 2 times daily    gabapentin (NEURONTIN) 400 mg, Oral    glipiZIDE (GLUCOTROL) 10 MG tablet 1 tablet    glipiZIDE (GLUCOTROL) 5 mg, 2 times daily with meals    isosorbide mononitrate (IMDUR) 30 MG 24 hr tablet 1 tablet, Oral, Daily    memantine (NAMENDA) 10 mg, Oral, Daily    nicotine polacrilex 2 MG Lozg DISSOLVE 1 LOZENGE IN MOUTH EVERY 2 HOURS AS NEEDED FOR SMOKING CESSATION    nitroGLYCERIN (NITROSTAT) 0.4 MG SL tablet DISSOLVE ONE TABLET UNDER THE TONGUE EVERY 5 MINUTES AS NEEDED<BR>Strength: 0.4 mg    pantoprazole (PROTONIX) 40 mg    potassium chloride (KLOR-CON) 10 MEQ TbSR 10 mEq, Oral, Daily    promethazine (PHENERGAN) 25 mg, Oral, Every 4 hours    sertraline (ZOLOFT) 50 mg, Oral, Daily    SOOLANTRA 1 % Crea Each Nostril, Daily    tamsulosin (FLOMAX) 0.4 mg, Oral    traZODone (DESYREL) 150 mg    triamterene-hydrochlorothiazide 37.5-25 mg (DYAZIDE) 37.5-25 mg per capsule 1  capsule, Oral, Every morning        EXAM:  Constitutional  General Appearance:  There is no height or weight on file to calculate BMI., NAD  Psychiatric   Orientation: Oriented to time, oriented to place, oriented to person  Mood and Affect: Active and alert, normal mood, normal affect  Gait and Station   Appearance:  Normal gait, normal tandem gait, able to walk on toes, able to walk on heels  Healed incision  5/5 strength  Sensation intact  2+ pulses

## 2022-09-27 ENCOUNTER — CLINICAL SUPPORT (OUTPATIENT)
Dept: REHABILITATION | Facility: HOSPITAL | Age: 77
End: 2022-09-27
Payer: MEDICARE

## 2022-09-27 DIAGNOSIS — Z98.890 S/P LAMINECTOMY: Primary | ICD-10-CM

## 2022-09-27 PROCEDURE — 97110 THERAPEUTIC EXERCISES: CPT | Mod: CQ

## 2022-09-27 NOTE — PROGRESS NOTES
Physical Therapy Treatment Note     Name: Usama Lee  Clinic Number: [id number]    Therapy Diagnosis:s/p laminectomy C3-C7     Physician: Charbel Blake MD    Visit Date: 9/27/2022   Physician Orders: PT Eval and Treat   Medical Diagnosis from Referral: Z47.89 Encounter for orthopedic follow-up care  Evaluation Date: 7/5/2022  Plan of Care Expiration: 8/19/2022  Visit # / Visits authorized: 3/8  Authorization Period Expiration:   Plan of Care Certification Period:   Updated Plan of Care Due:   PTA Visit #: 1    Time In: 9:35 AM  Time Out: 10:10     AM  Total Billable Time:   35   minutes    Precautions: pacemaker  Functional Level Prior to Evaluation:     Subjective     Pt reports: = Absence of pain upon arrival, pt stated he just continues to experience weakness in both legs.     Pain: 0/10   Location: Cervical    Objective     Magen received therapeutic exercises to develop strength, endurance, ROM, flexibility and core stabilization for  35  minutes including:  Sci Fit Stepper x 8 min at level 4.5  Scapular retraction 2x20 elbows bent BTB  Levator stretch 5x 10 sec each   Shoulder rolls  x15 fwd/bwd  Cervical flexion/ext/rotation x 15 within pain free range (not this visit)  Cervical isometrics in all directions x 3 min (not this visit)  Bridging x30 GYTB   SLRs 1x15  With 3#   Trunk rotation 2x10 in hooklying  Leg press #70  x30 DL SL x30   Seated marches 3x10 with 3# CW  Hamstring Curl/LAQ 50#/30# x30 each  Hip ABD BTB in hooklying x30 (not this visit)    (Not This visit)    Magen participated in neuromuscular re-education activities to improve: Balance and Coordination for  15 minutes. The following activities were included:    BLE SLS // bars 1 min each x2 bouts, with single hand hold intermittantly on mini tramp (not this visit)  BLE Tandem standing x 1 min each // bars x2 bouts  Cone taps alternating x 15 each // bars   Cone weaving with rollator x 10 cones (not this visit)  Cone step overs with  rollator alternating BLEs x 20 ft (not this visit)          Home Exercises Provided and Patient Education Provided     Education provided: yes, handout     Written Home Exercises Provided: yes.  Exercises were reviewed and Magen was able to demonstrate them prior to the end of the session.  Magen demonstrated good  understanding of the education provided.         Assessment     Pt tolerated Tx well and is progressing with skilled care. Pt presented  bouts of fatigue, requiring frequent rest breaks. Pt continues to c/o weakness in BLEs.      Magen Is progressing well towards his goals.   Pt prognosis is Good.     Pt will continue to benefit from skilled outpatient physical therapy to address the deficits listed in the problem list box on initial evaluation, provide pt/family education and to maximize pt's level of independence in the home and community environment.     Pt's spiritual, cultural and educational needs considered and pt agreeable to plan of care and goals.     Anticipated barriers to physical therapy:     Goals:  Short Term Goals: 3 weeks   1. Patient will demonstrate TUG score of 9 seconds without LOB or deviation to path with use of rollator  2. Patient will increase pain free ROM of cervical rotation to 60 degrees bilaterally  3. Patient will increase demonstrate G-/F+ dynamic standing balance to reduce overall fall risks     Long Term Goals: 6 weeks   1. Patient will demonstrate DGI score of 19/24  2. Patient will increase FOTO score by 10 points or more relating to function utilizing cervical spine.         Plan     Pt will continue with POC.    Magdalena Jacob, PTA  9/27/2022

## 2022-09-28 PROCEDURE — 88305 TISSUE EXAM BY PATHOLOGIST: CPT | Mod: 26,,, | Performed by: PATHOLOGY

## 2022-09-28 PROCEDURE — 88305 PATHOLOGY, DERMATOLOGY: ICD-10-PCS | Mod: 26,,, | Performed by: PATHOLOGY

## 2022-09-28 PROCEDURE — 88305 TISSUE EXAM BY PATHOLOGIST: CPT | Mod: SUR

## 2022-09-30 ENCOUNTER — LAB REQUISITION (OUTPATIENT)
Dept: LAB | Facility: HOSPITAL | Age: 77
End: 2022-09-30
Payer: MEDICARE

## 2022-09-30 DIAGNOSIS — D49.2 NEOPLASM OF UNSPECIFIED BEHAVIOR OF BONE, SOFT TISSUE, AND SKIN: ICD-10-CM

## 2022-10-03 LAB
DHEA SERPL-MCNC: NORMAL
ESTROGEN SERPL-MCNC: NORMAL PG/ML
INSULIN SERPL-ACNC: NORMAL U[IU]/ML
LAB AP GROSS DESCRIPTION: NORMAL
LAB AP LABORATORY NOTES: NORMAL
LAB AP SPEC A DDX: NORMAL
LAB AP SPEC A MORPHOLOGY: NORMAL
LAB AP SPEC A PROCEDURE: NORMAL
T3RU NFR SERPL: NORMAL %

## 2022-10-04 ENCOUNTER — CLINICAL SUPPORT (OUTPATIENT)
Dept: REHABILITATION | Facility: HOSPITAL | Age: 77
End: 2022-10-04
Payer: MEDICARE

## 2022-10-04 DIAGNOSIS — Z98.890 S/P LAMINECTOMY: Primary | ICD-10-CM

## 2022-10-04 PROCEDURE — 97112 NEUROMUSCULAR REEDUCATION: CPT

## 2022-10-04 PROCEDURE — 97110 THERAPEUTIC EXERCISES: CPT

## 2022-10-04 NOTE — PROGRESS NOTES
Physical Therapy Treatment Note     Name: Usama Lee  Clinic Number: [id number]    Therapy Diagnosis:s/p laminectomy C3-C7     Physician: Charbel Blake MD    Visit Date: 10/4/2022   Physician Orders: PT Eval and Treat   Medical Diagnosis from Referral: Z47.89 Encounter for orthopedic follow-up care  Evaluation Date: 7/5/2022  Plan of Care Expiration: 8/19/2022  Visit # / Visits authorized: 4/8  Authorization Period Expiration:   Plan of Care Certification Period:   Updated Plan of Care Due:   PTA Visit #: 0    Time In: 9:25 AM  Time Out: 9:55  AM  Total Billable Time:  30    minutes    Precautions: pacemaker  Functional Level Prior to Evaluation:     Subjective     Pt reports: = Absence of pain upon arrival, pt stated he  continues to experience some weakness in legs.     Pain: 0/10   Location: Cervical    Objective     Magen received therapeutic exercises to develop strength, endurance, ROM, flexibility and core stabilization for 20 minutes including:  Sci Fit Stepper x 8 min at level 4.0  Scapular retraction 2x20 elbows bent BTB not today  Levator stretch 5x 10 sec each not today  Shoulder rolls  x15 fwd/bwd not today  Cervical flexion/ext/rotation x 15 within pain free range (not this visit)  Cervical isometrics in all directions x 3 min (not this visit)  Bridging x30  SLRs 1x15  With 3#   Trunk rotation 2x10 in hooklying  Leg press #70  x30 DL SL x30   Hamstring Curl/LAQ 50#/30# x30 each  Hip ABD BTB in hooklying x30     Magen participated in neuromuscular re-education activities to improve: Balance and Coordination for  10 minutes. The following activities were included:    BLE SLS // bars 1 min each x2 bouts, with single hand hold intermittantly on mini tramp (not this visit)  BLE Tandem standing x 1 min each // bars x2 bouts  Cone taps alternating x 15 each // bars   Cone weaving with rollator x 10 cones (not this visit)  Cone step overs with rollator alternating BLEs x 20 ft (not this  visit)          Home Exercises Provided and Patient Education Provided     Education provided: yes, handout     Written Home Exercises Provided: yes.  Exercises were reviewed and Magen was able to demonstrate them prior to the end of the session.  Magen demonstrated good  understanding of the education provided.         Assessment     Pt tolerated Tx well and is progressing with skilled care. Pt complained of no pain and completed all exercises with only one rest break between exercises..       Magen Is progressing well towards his goals.   Pt prognosis is Good.     Pt will continue to benefit from skilled outpatient physical therapy to address the deficits listed in the problem list box on initial evaluation, provide pt/family education and to maximize pt's level of independence in the home and community environment.     Pt's spiritual, cultural and educational needs considered and pt agreeable to plan of care and goals.     Anticipated barriers to physical therapy:     Goals:  Short Term Goals: 3 weeks   1. Patient will demonstrate TUG score of 9 seconds without LOB or deviation to path with use of rollator  2. Patient will increase pain free ROM of cervical rotation to 60 degrees bilaterally  3. Patient will increase demonstrate G-/F+ dynamic standing balance to reduce overall fall risks     Long Term Goals: 6 weeks   1. Patient will demonstrate DGI score of 19/24  2. Patient will increase FOTO score by 10 points or more relating to function utilizing cervical spine.         Plan     Pt will continue with POC.    Alber Fowler, PT  10/4/2022

## 2022-10-06 ENCOUNTER — CLINICAL SUPPORT (OUTPATIENT)
Dept: REHABILITATION | Facility: HOSPITAL | Age: 77
End: 2022-10-06
Payer: MEDICARE

## 2022-10-06 DIAGNOSIS — Z98.890 S/P LAMINECTOMY: Primary | ICD-10-CM

## 2022-10-06 PROCEDURE — 97110 THERAPEUTIC EXERCISES: CPT

## 2022-10-06 NOTE — PLAN OF CARE
Outpatient Therapy Discharge Summary     Name: Usama Lee  Clinic Number: [id number]    Therapy Diagnosis:   Encounter Diagnosis   Name Primary?    S/P laminectomy Yes     Physician: Charbel Blake MD    Physician Orders: PT to Eval and Treat  Medical Diagnosis: Z98.890  Evaluation Date: 7/5/2022    Date of Last visit: 10/6/2022  Total Visits Received: 15  Cancelled Visits: 4  No Show Visits: 0    Assessment      Goals:  1. Patient will increase pain free ROM of cervical spine by 15% to reduce pain with checking blind spots while driving and looking up as if to reach for something overhead (Achieved)  2. Patient will increase Tinetti score to 23/28 to reduce overall fall risks. (Achieved)  3. Patient will increase DGI score to 18/24 (Achieved)  4. Patient will demonstrate 9 stands in 30 seconds unassisted from standard height chair demonstrating improved LE strength and endurance (Achieved)    Discharge reason: Patient has met all of his/her goals and Patient has reached the maximum rehab potential for the present time    Plan   This patient is discharged from Physical Therapy.

## 2022-10-06 NOTE — PROGRESS NOTES
Physical Therapy Treatment Note     Name: Usama Lee  Clinic Number: [id number]    Therapy Diagnosis:s/p laminectomy C3-C7     Physician: Charbel Blake MD    Visit Date: 10/6/2022   Physician Orders: PT Eval and Treat   Medical Diagnosis from Referral: Z47.89 Encounter for orthopedic follow-up care  Evaluation Date: 7/5/2022  Plan of Care Expiration: 8/19/2022  Visit # / Visits authorized: 5/8  Authorization Period Expiration:   Plan of Care Certification Period:   Updated Plan of Care Due:   PTA Visit #: 0    Time In: 10:15 AM  Time Out: 11:00  AM  Total Billable Time:  45 minutes    Precautions: pacemaker  Functional Level Prior to Evaluation:     Subjective     Pt reports: = Absence of pain upon arrival, pt stated he  continues to experience some weakness in legs.     Pain: 0/10   Location: Cervical    Objective     Magen received therapeutic exercises to develop strength, endurance, ROM, flexibility and core stabilization for 45 minutes including:  Sci Fit Stepper x 8 min at level 4.0  Scapular retraction 2x20 elbows bent BTB not today  Levator stretch 5x 10 sec each not today  Shoulder rolls  x15 fwd/bwd not today  Cervical flexion/ext/rotation x 15 within pain free range (not this visit)  Cervical isometrics in all directions x 3 min (not this visit)  Bridging x30  SLRs 1x15  With 3#   Trunk rotation 2x10 in hooklying  Leg press #70  x30 DL SL x30   Hamstring Curl/LAQ 50#/30# x30 each  Hip ABD BTB in hooklying x30     Magen participated in neuromuscular re-education activities to improve: Balance and Coordination for   minutes. The following activities were included:    BLE SLS // bars 1 min each x2 bouts, with single hand hold intermittantly on mini tramp (not this visit)  BLE Tandem standing x 1 min each // bars x2 bouts  Cone taps alternating x 15 each // bars   Cone weaving with rollator x 10 cones (not this visit)  Cone step overs with rollator alternating BLEs x 20 ft (not this  visit)          Home Exercises Provided and Patient Education Provided     Education provided: yes, handout     Written Home Exercises Provided: yes.  Exercises were reviewed and Magen was able to demonstrate them prior to the end of the session.  Magen demonstrated good  understanding of the education provided.         Assessment     Pt tolerated Tx well and is progressing with skilled care. Pt complained of no pain and completed all exercises with only one rest break between exercises.      Magen Is progressing well towards his goals.   Pt prognosis is Good.     Pt will continue to benefit from skilled outpatient physical therapy to address the deficits listed in the problem list box on initial evaluation, provide pt/family education and to maximize pt's level of independence in the home and community environment.     Pt's spiritual, cultural and educational needs considered and pt agreeable to plan of care and goals.     Anticipated barriers to physical therapy:     Goals:  Short Term Goals: 3 weeks   1. Patient will demonstrate TUG score of 9 seconds without LOB or deviation to path with use of rollator  2. Patient will increase pain free ROM of cervical rotation to 60 degrees bilaterally  3. Patient will increase demonstrate G-/F+ dynamic standing balance to reduce overall fall risks     Long Term Goals: 6 weeks   1. Patient will demonstrate DGI score of 19/24  2. Patient will increase FOTO score by 10 points or more relating to function utilizing cervical spine.         Plan     Pt will continue with POC.    Alber Fowler, PT  10/6/2022

## 2022-10-09 DIAGNOSIS — Z71.89 COMPLEX CARE COORDINATION: ICD-10-CM

## 2022-10-19 DIAGNOSIS — G20.C PARKINSONISM, UNSPECIFIED PARKINSONISM TYPE: Primary | ICD-10-CM

## 2022-10-20 PROCEDURE — 88305 TISSUE EXAM BY PATHOLOGIST: CPT | Mod: SUR | Performed by: DERMATOLOGY

## 2022-10-20 PROCEDURE — 88305 PATHOLOGY, DERMATOLOGY: ICD-10-PCS | Mod: 26,,, | Performed by: PATHOLOGY

## 2022-10-20 PROCEDURE — 88342 PATHOLOGY, DERMATOLOGY: ICD-10-PCS | Mod: 26,,, | Performed by: PATHOLOGY

## 2022-10-20 PROCEDURE — 88341 IMHCHEM/IMCYTCHM EA ADD ANTB: CPT | Mod: 26,,, | Performed by: PATHOLOGY

## 2022-10-20 PROCEDURE — 88342 IMHCHEM/IMCYTCHM 1ST ANTB: CPT | Mod: 26,,, | Performed by: PATHOLOGY

## 2022-10-20 PROCEDURE — 88305 TISSUE EXAM BY PATHOLOGIST: CPT | Mod: 26,,, | Performed by: PATHOLOGY

## 2022-10-20 PROCEDURE — 88341 PATHOLOGY, DERMATOLOGY: ICD-10-PCS | Mod: 26,,, | Performed by: PATHOLOGY

## 2022-10-21 ENCOUNTER — LAB REQUISITION (OUTPATIENT)
Dept: LAB | Facility: HOSPITAL | Age: 77
End: 2022-10-21
Attending: DERMATOLOGY
Payer: MEDICARE

## 2022-10-21 DIAGNOSIS — D49.2 NEOPLASM OF UNSPECIFIED BEHAVIOR OF BONE, SOFT TISSUE, AND SKIN: ICD-10-CM

## 2022-10-21 DIAGNOSIS — L53.8 OTHER SPECIFIED ERYTHEMATOUS CONDITIONS: ICD-10-CM

## 2022-10-23 ENCOUNTER — HOSPITAL ENCOUNTER (EMERGENCY)
Facility: HOSPITAL | Age: 77
Discharge: HOME | End: 2022-10-23
Payer: MEDICARE

## 2022-10-23 VITALS
DIASTOLIC BLOOD PRESSURE: 79 MMHG | RESPIRATION RATE: 18 BRPM | SYSTOLIC BLOOD PRESSURE: 132 MMHG | WEIGHT: 215 LBS | TEMPERATURE: 98 F | OXYGEN SATURATION: 96 % | BODY MASS INDEX: 30.1 KG/M2 | HEIGHT: 71 IN | HEART RATE: 89 BPM

## 2022-10-23 DIAGNOSIS — S61.411A LACERATION OF RIGHT HAND, FOREIGN BODY PRESENCE UNSPECIFIED, INITIAL ENCOUNTER: Primary | ICD-10-CM

## 2022-10-23 PROCEDURE — 12002 RPR S/N/AX/GEN/TRNK2.6-7.5CM: CPT | Mod: GF | Performed by: NURSE PRACTITIONER

## 2022-10-23 PROCEDURE — 99284 EMERGENCY DEPT VISIT MOD MDM: CPT

## 2022-10-23 PROCEDURE — 25000003 PHARM REV CODE 250: Performed by: NURSE PRACTITIONER

## 2022-10-23 PROCEDURE — 12002 RPR S/N/AX/GEN/TRNK2.6-7.5CM: CPT

## 2022-10-23 PROCEDURE — 99282 EMERGENCY DEPT VISIT SF MDM: CPT | Mod: 25,GF | Performed by: NURSE PRACTITIONER

## 2022-10-23 RX ORDER — LIDOCAINE HYDROCHLORIDE 10 MG/ML
10 INJECTION INFILTRATION; PERINEURAL
Status: COMPLETED | OUTPATIENT
Start: 2022-10-23 | End: 2022-10-23

## 2022-10-23 RX ADMIN — LIDOCAINE HYDROCHLORIDE 10 ML: 10 INJECTION, SOLUTION INFILTRATION; PERINEURAL at 12:10

## 2022-10-23 RX ADMIN — BACITRACIN ZINC, NEOMYCIN, POLYMYXIN B 1 EACH: 400; 3.5; 5 OINTMENT TOPICAL at 12:10

## 2022-10-23 NOTE — ED PROVIDER NOTES
Encounter Date: 10/23/2022       History     Chief Complaint   Patient presents with    Laceration     Patient presents to the ED with complaint of right palm laceration that occurred about 30 min PTA while working outside at home. States he cut hand on a trailer while picking up limbs. Bleeding controled with pressure.  Currently on Eliquis for cardiac stents.   Up to date with tetanus vaccine.    The history is provided by the patient.   Review of patient's allergies indicates:   Allergen Reactions    Codeine     Lovastatin      Other reaction(s): Muscle pain     Past Medical History:   Diagnosis Date    Diabetes     Gout     Heart disease     High cholesterol     HTN (hypertension)     Parkinson disease     Sleep apnea      Past Surgical History:   Procedure Laterality Date    APPENDECTOMY      CARDIAC SURGERY      CERVICAL LAMINECTOMY WITH SPINAL FUSION N/A 04/28/2022    Procedure: C3-C7 Laminectomy and Fusion;  Surgeon: Charbel Blake MD;  Location: [facility] OR;  Service: Neurosurgery;  Laterality: N/A;  Neuro monitoring    FLEXOR TENDON REPAIR Left 08/19/2021    Procedure: REPAIR, TENDON, FLEXOR;  Surgeon: Daniel Dempsey MD;  Location: [facility] OR;  Service: Orthopedics;  Laterality: Left;    KNEE SURGERY      TOTAL KNEE ARTHROPLASTY Right     TRIGGER FINGER RELEASE Left 08/19/2021    Procedure: RELEASE, TRIGGER FINGER,RING;  Surgeon: Daniel Dempsey MD;  Location: [facility] OR;  Service: Orthopedics;  Laterality: Left;     History reviewed. No pertinent family history.  Social History     Tobacco Use    Smoking status: Never    Smokeless tobacco: Never   Substance Use Topics    Alcohol use: Not Currently    Drug use: Never     Review of Systems   Constitutional: Negative.  Negative for activity change, appetite change, fatigue and fever.   HENT: Negative.     Eyes: Negative.    Respiratory: Negative.  Negative for cough and shortness of breath.    Cardiovascular: Negative.     Gastrointestinal: Negative.  Negative for abdominal pain, diarrhea, nausea and vomiting.   Genitourinary: Negative.    Musculoskeletal:  Positive for gait problem (uses rolling walker).   Skin:  Positive for wound (laceration x 2 to right palm of hand).   Neurological:  Negative for weakness and numbness.   Hematological: Negative.    Psychiatric/Behavioral: Negative.       Physical Exam     Initial Vitals [10/23/22 1234]   BP Pulse Resp Temp SpO2   132/79 89 18 98 °F (36.7 °C) 96 %      MAP       --         Physical Exam    Nursing note and vitals reviewed.  Constitutional: He appears well-developed and well-nourished. He is cooperative. He does not appear ill. No distress.   HENT:   Head: Normocephalic and atraumatic.   Right Ear: External ear normal.   Left Ear: External ear normal.   Mouth/Throat: Mucous membranes are normal.   Eyes: Conjunctivae are normal. Pupils are equal, round, and reactive to light.   Cardiovascular:  Normal rate, regular rhythm, normal heart sounds and intact distal pulses.           Pulses:       Radial pulses are 2+ on the right side and 2+ on the left side.   Pulmonary/Chest: Effort normal and breath sounds normal.     Neurological: He is alert and oriented to person, place, and time. He has normal strength. No sensory deficit.   Skin: Skin is warm and dry. Capillary refill takes less than 2 seconds. Laceration noted.        Psychiatric: He has a normal mood and affect. His speech is normal and behavior is normal.       Medical Screening Exam   See Full Note    ED Course   Lac Repair    Date/Time: 10/23/2022 1:11 PM  Performed by: RODNEY Bradshaw  Authorized by: RODNEY Bradshaw     Consent:     Consent obtained:  Verbal and emergent situation    Consent given by:  Patient    Risks, benefits, and alternatives were discussed: yes      Risks discussed:  Infection  Universal protocol:     Procedure explained and questions answered to patient or proxy's satisfaction: yes       Relevant documents present and verified: yes      Immediately prior to procedure, a time out was called: yes      Patient identity confirmed:  Verbally with patient and arm band  Anesthesia:     Anesthesia method:  Local infiltration    Local anesthetic:  Lidocaine 1% w/o epi  Laceration details:     Location:  Hand    Hand location:  R palm    Length (cm):  2.5  Pre-procedure details:     Preparation:  Patient was prepped and draped in usual sterile fashion  Exploration:     Hemostasis achieved with:  Direct pressure    Wound exploration: wound explored through full range of motion and entire depth of wound visualized      Contaminated: no    Treatment:     Area cleansed with:  Povidone-iodine, saline and Shur-Clens    Amount of cleaning:  Standard    Irrigation solution:  Sterile saline    Irrigation volume:  20    Irrigation method:  Syringe    Debridement:  None    Undermining:  None  Skin repair:     Repair method:  Sutures    Suture size:  5-0    Suture material:  Nylon    Suture technique:  Simple interrupted    Number of sutures:  9  Approximation:     Approximation:  Close  Repair type:     Repair type:  Simple  Post-procedure details:     Dressing:  Antibiotic ointment and non-adherent dressing    Procedure completion:  Tolerated well, no immediate complications  Comments:      Good ROM of hand before and after sutures placed.  Lac Repair    Date/Time: 10/23/2022 1:13 PM  Performed by: RODNEY Bradshaw  Authorized by: RODNEY Bradshaw     Consent:     Consent obtained:  Verbal and emergent situation    Consent given by:  Patient    Risks, benefits, and alternatives were discussed: yes      Risks discussed:  Infection and poor wound healing  Universal protocol:     Procedure explained and questions answered to patient or proxy's satisfaction: yes      Relevant documents present and verified: yes      Immediately prior to procedure, a time out was called: yes      Patient identity confirmed:   Verbally with patient and arm band  Anesthesia:     Anesthesia method:  Local infiltration    Local anesthetic:  Lidocaine 1% w/o epi  Laceration details:     Location:  Hand    Hand location:  R palm    Length (cm):  1  Pre-procedure details:     Preparation:  Patient was prepped and draped in usual sterile fashion  Exploration:     Hemostasis achieved with:  Direct pressure    Wound exploration: wound explored through full range of motion and entire depth of wound visualized      Contaminated: no    Treatment:     Area cleansed with:  Povidone-iodine    Amount of cleaning:  Standard    Irrigation solution:  Sterile saline    Irrigation volume:  20    Irrigation method:  Syringe    Debridement:  None    Undermining:  None  Skin repair:     Repair method:  Sutures    Suture size:  5-0    Suture material:  Nylon    Suture technique:  Simple interrupted    Number of sutures:  3  Approximation:     Approximation:  Close  Repair type:     Repair type:  Simple  Post-procedure details:     Dressing:  Antibiotic ointment and non-adherent dressing    Procedure completion:  Tolerated well, no immediate complications  Comments:      Good ROM of hand before and after sutures placed.  Labs Reviewed - No data to display       Imaging Results    None          Medications   LIDOcaine HCL 10 mg/ml (1%) injection 10 mL (10 mLs Infiltration Given 10/23/22 1237)   neomycin-bacitracnZn-polymyxnB packet (1 each Topical (Top) Given 10/23/22 1237)     Medical Decision Making:   ED Management:  Reviewed discharge instructions with patient and copy of AVS given. He agreed with treatment plan and verbalized understanding.                  Clinical Impression:   Final diagnoses:  [S61.411A] Laceration of right hand, foreign body presence unspecified, initial encounter (Primary)        ED Disposition Condition    Discharge Stable          ED Prescriptions    None       Follow-up Information       Follow up With Specialties Details Why Contact  Info    [facility] - Emergency Department Emergency Medicine In 2 days  [address]  [facility] [address]  [phone]             Cristy Sidhu, RODNEY  10/23/22 1685

## 2022-10-23 NOTE — ED TRIAGE NOTES
PT ambulatory to ED with a 1-2cm laceration to the R hand. Pt states he was loading some limbs and cut his hand on a trailer around 30 mins ago. Pt states he is utd on tetanus.

## 2022-10-25 ENCOUNTER — HOSPITAL ENCOUNTER (EMERGENCY)
Facility: HOSPITAL | Age: 77
Discharge: HOME | End: 2022-10-25
Attending: FAMILY MEDICINE
Payer: MEDICARE

## 2022-10-25 VITALS
WEIGHT: 212 LBS | HEART RATE: 78 BPM | DIASTOLIC BLOOD PRESSURE: 67 MMHG | TEMPERATURE: 98 F | HEIGHT: 71 IN | OXYGEN SATURATION: 97 % | BODY MASS INDEX: 29.68 KG/M2 | RESPIRATION RATE: 18 BRPM | SYSTOLIC BLOOD PRESSURE: 117 MMHG

## 2022-10-25 DIAGNOSIS — Z51.89 VISIT FOR WOUND CHECK: Primary | ICD-10-CM

## 2022-10-25 PROCEDURE — 99281 EMR DPT VST MAYX REQ PHY/QHP: CPT

## 2022-10-25 PROCEDURE — 99281 EMR DPT VST MAYX REQ PHY/QHP: CPT | Performed by: FAMILY MEDICINE

## 2022-10-25 NOTE — ED PROVIDER NOTES
Encounter Date: 10/25/2022       History     Chief Complaint   Patient presents with    Wound Check     WOUND CHECK SUTURE TO RIGHT HAND. STATES MINIMAL PAIN. IS RIGHT HANDED    The history is provided by the patient.   Review of patient's allergies indicates:   Allergen Reactions    Codeine     Lovastatin      Other reaction(s): Muscle pain     Past Medical History:   Diagnosis Date    Diabetes     Gout     Heart disease     High cholesterol     HTN (hypertension)     Parkinson disease     Sleep apnea      Past Surgical History:   Procedure Laterality Date    APPENDECTOMY      CARDIAC SURGERY      CERVICAL LAMINECTOMY WITH SPINAL FUSION N/A 04/28/2022    Procedure: C3-C7 Laminectomy and Fusion;  Surgeon: Charbel Blake MD;  Location: [facility] OR;  Service: Neurosurgery;  Laterality: N/A;  Neuro monitoring    FLEXOR TENDON REPAIR Left 08/19/2021    Procedure: REPAIR, TENDON, FLEXOR;  Surgeon: Daniel Dempsey MD;  Location: [facility] OR;  Service: Orthopedics;  Laterality: Left;    KNEE SURGERY      TOTAL KNEE ARTHROPLASTY Right     TRIGGER FINGER RELEASE Left 08/19/2021    Procedure: RELEASE, TRIGGER FINGER,RING;  Surgeon: Daniel Dempsey MD;  Location: [facility] OR;  Service: Orthopedics;  Laterality: Left;     History reviewed. No pertinent family history.  Social History     Tobacco Use    Smoking status: Never    Smokeless tobacco: Never   Substance Use Topics    Alcohol use: Not Currently    Drug use: Never     Review of Systems   Skin:  Positive for wound.   All other systems reviewed and are negative.    Physical Exam     Initial Vitals [10/25/22 0955]   BP Pulse Resp Temp SpO2   117/67 78 18 98.4 °F (36.9 °C) 97 %      MAP       --         Physical Exam    Constitutional: He appears well-developed and well-nourished.   HENT:   Head: Normocephalic and atraumatic.   Eyes: Pupils are equal, round, and reactive to light.   Neck: Neck supple.   Normal range of motion.  Cardiovascular:   Normal rate.           Pulmonary/Chest: Breath sounds normal.   Abdominal: Abdomen is soft.   Musculoskeletal:         General: Normal range of motion.      Cervical back: Normal range of motion and neck supple.     Neurological: He is alert and oriented to person, place, and time.   Skin:   SUTURES RIGHT PALM   Psychiatric: He has a normal mood and affect. His behavior is normal. Judgment and thought content normal.       Medical Screening Exam   See Full Note    ED Course   Procedures  Labs Reviewed - No data to display       Imaging Results    None          Medications - No data to display                    Clinical Impression:   Final diagnoses:  [Z51.89] Visit for wound check (Primary)             Nesha Brenner MD  10/25/22 1013

## 2022-10-25 NOTE — ED TRIAGE NOTES
Presents for 2 day wound check to right hand.  Sutures intact to palm of hand.  No s/s of infection

## 2022-11-08 NOTE — PROGRESS NOTES
Subjective:       Patient ID: Usama Lee is a 77 y.o. male.    Chief Complaint:  No chief complaint on file.      History of Present Illness  This pleasant 77 year old right handed  male presents to clinic with his  as a new patient referral from Dr. JACQUES Padilla for Parkinson disease. Patient states he was clinically diagnosed with Parkinson's disease about 15 years ago. He reports intermittent dysphagia, freezing of gait, cogwheeling, resting and action tremors, weight loss and TREVER. He is on a cpap and reports compliance. He also reports falls and states his last fall was yesterday without any head injury. He is s/p cervical surgery with Dr. DREW Blake in April 2022. Discussed fall precautions in detail to include having any head injury medically evaluated due to being on a blood thinner. He has had physical therapy in the past and desires to have more for the gait, balance and strength training. He denies bradykinesia, micrographia, orthostatic hypotension, RLS, depression, anxiety or hallucinations. He states he did have some hallucinations while on the pain medication. He states he is no longer on the pain medication and is no longer having any hallucinations. He was treated at [facility] and is followed by Dr. CATHY Ledesma. His MMSE score today is 28/30. There was no stooped posture or mask like facial appearance. The glabellar tap and werner chin reflex were negative. He does have a mild resting and action tremor mostly in the right arm and head. He is on Sinemet  mg one tablet three times a day and is tolerating this medication without side effects. His PMH includes DM, gout, heart disease, heart stents, pace maker, HTN, PD, TREVER, A-fib, iron anemia, and neuropathy. His pacemaker is NOT MRI compatible and cannot have an MRI. His family medical history includes DM, heart disease, and HTN but is negative for dementia or Parkinson's disease. He denies smoking and drinks 2  to 4 beers three times a week. Discussed the plan in detail with Neurologist Dr. ROSA Batres, the patient and  and they are in agreement with the plan. All their questions were answered at today's visit.       Review of Systems  Review of Systems   Constitutional:  Negative for activity change, diaphoresis, fever and unexpected weight change.   HENT:  Negative for congestion, ear pain, facial swelling, hearing loss, tinnitus, trouble swallowing and voice change.    Eyes:  Negative for photophobia, pain and visual disturbance.   Respiratory:  Negative for chest tightness, shortness of breath and wheezing.    Cardiovascular:  Negative for chest pain, palpitations and leg swelling.   Gastrointestinal:  Negative for constipation, diarrhea, nausea and vomiting.   Genitourinary:  Negative for difficulty urinating.   Musculoskeletal:  Positive for gait problem. Negative for back pain, neck pain and neck stiffness.   Skin:  Negative for color change, pallor, rash and wound.   Neurological:  Negative for dizziness, tremors, seizures, syncope, facial asymmetry, speech difficulty, weakness, light-headedness, numbness and headaches.        Parkinsonism    Psychiatric/Behavioral:  Negative for agitation, behavioral problems, confusion and hallucinations. The patient is not nervous/anxious and is not hyperactive.      Objective:      Neurologic Exam     Mental Status   Oriented to person, place, and time.   Oriented to person.   Oriented to place.   Oriented to time.   Registration: recalls 3 of 3 objects. Recall at 5 minutes: recalls 2 of 3 objects. Follows 3 step commands.   Attention: normal. Concentration: normal.   Speech: speech is normal   Level of consciousness: alert  Knowledge: good.     MMSE score 28/30     Cranial Nerves     CN II   Visual fields full to confrontation.     CN III, IV, VI   Pupils are equal, round, and reactive to light.  Extraocular motions are normal.   Right pupil: Size: 3 mm. Shape:  regular. Reactivity: brisk.   Left pupil: Size: 3 mm. Shape: regular. Reactivity: brisk.     CN V   Facial sensation intact.     CN VII   Facial expression full, symmetric.     CN VIII   Hearing: impaired    CN IX, X   CN IX normal.   CN X normal.     CN XI   CN XI normal.     CN XII   CN XII normal.     Motor Exam   Muscle bulk: normal  Overall muscle tone: normal  Right arm pronator drift: absent  Left arm pronator drift: absent    Strength   Right deltoid: 5/5  Left deltoid: 5/5  Right biceps: 5/5  Left biceps: 5/5  Right triceps: 5/5  Left triceps: 5/5  Right quadriceps: 5/5  Left quadriceps: 5/5  Right hamstrin/5  Left hamstrin/5    Sensory Exam   Light touch normal.   Right leg vibration: decreased from knee  Left leg vibration: decreased from knee  Proprioception normal.   Right leg pinprick: decreased from knee  Left leg pinprick: decreased from knee    Gait, Coordination, and Reflexes     Gait  Gait: normal    Coordination   Romberg: positive  Finger to nose coordination: normal    Tremor   Resting tremor: present  Intention tremor: absent  Action tremor: left arm and right arm    Reflexes   Right brachioradialis: 1+  Left brachioradialis: 1+  Right biceps: 1+  Left biceps: 1+  Right triceps: 1+  Left triceps: 1+  Right patellar: 1+  Left patellar: 1+  Right achilles: 1+  Left achilles: 1+  Right : 4+  Left : 4+  Ambulates with a walker     Physical Exam  Constitutional:       General: He is not in acute distress.  HENT:      Head: Normocephalic.      Mouth/Throat:      Mouth: Mucous membranes are moist.   Eyes:      Extraocular Movements: Extraocular movements intact and EOM normal.      Pupils: Pupils are equal, round, and reactive to light.   Cardiovascular:      Rate and Rhythm: Normal rate and regular rhythm.      Heart sounds: Normal heart sounds. No murmur heard.  Pulmonary:      Effort: Pulmonary effort is normal. No respiratory distress.      Breath sounds: Normal breath sounds. No  wheezing, rhonchi or rales.   Musculoskeletal:         General: No swelling, tenderness, deformity or signs of injury. Normal range of motion.      Cervical back: Normal range of motion. No rigidity or tenderness.      Right lower leg: No edema.      Left lower leg: No edema.   Skin:     General: Skin is warm and dry.      Capillary Refill: Capillary refill takes less than 2 seconds.      Coloration: Skin is not jaundiced or pale.      Findings: No bruising, erythema, lesion or rash.   Neurological:      Mental Status: He is alert and oriented to person, place, and time.      Coordination: Romberg Test abnormal. Finger-Nose-Finger Test normal.      Gait: Gait is intact.      Deep Tendon Reflexes:      Reflex Scores:       Tricep reflexes are 1+ on the right side and 1+ on the left side.       Bicep reflexes are 1+ on the right side and 1+ on the left side.       Brachioradialis reflexes are 1+ on the right side and 1+ on the left side.       Patellar reflexes are 1+ on the right side and 1+ on the left side.       Achilles reflexes are 1+ on the right side and 1+ on the left side.  Psychiatric:         Mood and Affect: Mood normal.         Speech: Speech normal.         Behavior: Behavior normal. Behavior is cooperative.         Thought Content: Thought content normal.         Assessment:     Problem List Items Addressed This Visit          Neuro    Parkinson disease - Primary (Chronic)    Relevant Orders    CT Head Without Contrast    NM Brain 3D Imaging Spect    CBC Auto Differential    Comprehensive Metabolic Panel    Vitamin B12 & Folate    TSH    T4, Free    Vitamin D    Hemoglobin A1C    Ambulatory referral/consult to Physical/Occupational Therapy    Fl Modified Barium Swallow Speech    SLP video swallow       GI    Dysphagia    Relevant Orders    Fl Modified Barium Swallow Speech    SLP video swallow       Orthopedic    Fall    Relevant Orders    CT Head Without Contrast    CBC Auto Differential     Comprehensive Metabolic Panel    Vitamin B12 & Folate    TSH    T4, Free    Vitamin D    Hemoglobin A1C    Ambulatory referral/consult to Physical/Occupational Therapy       Palliative Care    On long term drug therapy    Relevant Orders    CBC Auto Differential    Comprehensive Metabolic Panel    Vitamin B12 & Folate    TSH    T4, Free    Vitamin D    Hemoglobin A1C       Other    Obstructive sleep apnea syndrome     Other Visit Diagnoses       Parkinsonism, unspecified Parkinsonism type                  Plan:       CT of the brain without contrast to evaluate Parkinson disease and falls   Referral for LYNDSEY scan to evaluate Parkinson disease    Labs: cbc, cmp, b12, folate, tsh, free t 4, vit d, a1c  Fall precautions have any head injury medically evaluated due to blood thinner  Referral for physical therapy to evaluate and treat gait, balance and strength   Referral to speech therapy for swallow evaluation   MMSE score 28/30   Follow up with Dr. Ledesma for Namenda recommendation   Continue Sinemet  mg one tablet three times a day   Continue Cpap as prescribed   Follow up with Neurology in 3 months or sooner if needed

## 2022-11-09 ENCOUNTER — OFFICE VISIT (OUTPATIENT)
Dept: NEUROLOGY | Facility: CLINIC | Age: 77
End: 2022-11-09
Payer: MEDICARE

## 2022-11-09 VITALS
WEIGHT: 226 LBS | OXYGEN SATURATION: 96 % | SYSTOLIC BLOOD PRESSURE: 124 MMHG | DIASTOLIC BLOOD PRESSURE: 62 MMHG | HEART RATE: 78 BPM | HEIGHT: 71 IN | BODY MASS INDEX: 31.64 KG/M2
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
## 2022-11-09 DIAGNOSIS — Z79.899 ON LONG TERM DRUG THERAPY: ICD-10-CM

## 2022-11-09 DIAGNOSIS — R13.10 DYSPHAGIA, UNSPECIFIED TYPE: ICD-10-CM

## 2022-11-09 DIAGNOSIS — G20.C PARKINSONISM, UNSPECIFIED PARKINSONISM TYPE: ICD-10-CM

## 2022-11-09 DIAGNOSIS — G47.33 OBSTRUCTIVE SLEEP APNEA SYNDROME: ICD-10-CM

## 2022-11-09 DIAGNOSIS — G20.A1 PARKINSON DISEASE: Primary | Chronic | ICD-10-CM

## 2022-11-09 PROBLEM — M19.90 OSTEOARTHRITIS: Status: ACTIVE | Noted: 2022-11-09

## 2022-11-09 PROBLEM — F41.1 ANXIETY STATE: Status: ACTIVE | Noted: 2022-11-09
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
## 2022-11-09 PROBLEM — D50.9 IRON DEFICIENCY ANEMIA: Status: ACTIVE | Noted: 2022-11-09

## 2022-11-09 PROBLEM — I50.9 HEART FAILURE: Status: ACTIVE | Noted: 2022-11-09

## 2022-11-09 PROBLEM — E11.42 POLYNEUROPATHY DUE TO TYPE 2 DIABETES MELLITUS: Status: ACTIVE | Noted: 2022-11-09

## 2022-11-09 PROBLEM — I50.32 CHRONIC DIASTOLIC HEART FAILURE: Status: ACTIVE | Noted: 2022-11-09

## 2022-11-09 PROBLEM — E87.6 POTASSIUM DEFICIENCY: Status: ACTIVE | Noted: 2022-11-09

## 2022-11-09 PROBLEM — L02.439 CARBUNCLE AND FURUNCLE OF UPPER ARM AND FOREARM: Status: ACTIVE | Noted: 2022-11-09

## 2022-11-09 PROBLEM — I48.20 CHRONIC ATRIAL FIBRILLATION: Status: ACTIVE | Noted: 2022-11-09

## 2022-11-09 PROBLEM — G62.9 PERIPHERAL NEUROPATHY: Status: ACTIVE | Noted: 2022-11-09

## 2022-11-09 PROBLEM — N40.0 BENIGN PROSTATIC HYPERPLASIA: Status: ACTIVE | Noted: 2022-11-09

## 2022-11-09 PROBLEM — I25.110 ATHEROSCLEROSIS OF NATIVE CORONARY ARTERY WITH UNSTABLE ANGINA PECTORIS: Status: ACTIVE | Noted: 2022-11-09

## 2022-11-09 PROBLEM — L02.429 CARBUNCLE AND FURUNCLE OF UPPER ARM AND FOREARM: Status: ACTIVE | Noted: 2022-11-09

## 2022-11-09 PROBLEM — M54.50 LOW BACK PAIN: Status: ACTIVE | Noted: 2022-11-09

## 2022-11-09 PROBLEM — E11.40 DIABETIC NEUROPATHY: Status: ACTIVE | Noted: 2022-11-09

## 2022-11-09 PROBLEM — K21.9 GASTROESOPHAGEAL REFLUX DISEASE: Status: ACTIVE | Noted: 2022-11-09

## 2022-11-09 PROCEDURE — 99215 OFFICE O/P EST HI 40 MIN: CPT | Mod: PBBFAC | Performed by: NURSE PRACTITIONER

## 2022-11-09 PROCEDURE — 99203 PR OFFICE/OUTPT VISIT, NEW, LEVL III, 30-44 MIN: ICD-10-PCS | Mod: S$PBB,,, | Performed by: NURSE PRACTITIONER

## 2022-11-09 PROCEDURE — 99203 OFFICE O/P NEW LOW 30 MIN: CPT | Mod: S$PBB,,, | Performed by: NURSE PRACTITIONER

## 2022-11-09 RX ORDER — CYCLOBENZAPRINE HCL 5 MG
5 TABLET ORAL 3 TIMES DAILY PRN
COMMUNITY

## 2022-11-09 RX ORDER — SULFAMETHOXAZOLE AND TRIMETHOPRIM 800; 160 MG/1; MG/1
1 TABLET ORAL 2 TIMES DAILY
Status: ON HOLD | COMMUNITY
Start: 2022-10-20 | End: 2023-08-01

## 2022-11-09 NOTE — PATIENT INSTRUCTIONS
CT of the brain without contrast to evaluate Parkinson disease and falls   Referral for LYNDSEY scan to evaluate Parkinson disease    Labs: cbc, cmp, b12, folate, tsh, free t 4, vit d, a1c  Fall precautions have any head injury medically evaluated due to blood thinner  Referral for physical therapy to evaluate and treat gait, balance and strength   Referral to speech therapy for swallow evaluation   MMSE score 28/30   Follow up with Dr. Ledesma for Namenda recommendation   Continue Sinemet  mg one tablet three times a day   Continue Cpap as prescribed   Follow up with Neurology in 3 months or sooner if needed

## 2022-11-11 ENCOUNTER — TELEPHONE (OUTPATIENT)
Dept: NEUROLOGY | Facility: CLINIC | Age: 77
End: 2022-11-11
Payer: MEDICARE

## 2022-11-11 NOTE — TELEPHONE ENCOUNTER
Pt voiced understanding  ----- Message from Angel Guzman NP sent at 11/10/2022 11:50 AM CST -----  Please let patient know his blood sugar was elevated at 163, alk phos was elevated, free t4 slightly decreased, A1c was good, his rbc, H&H were slightly decreased, other labs looked good, he can follow these up with his PCP thanks

## 2022-11-21 ENCOUNTER — CLINICAL SUPPORT (OUTPATIENT)
Dept: REHABILITATION | Facility: HOSPITAL | Age: 77
End: 2022-11-21
Payer: MEDICARE

## 2022-11-21 DIAGNOSIS — G20.A1 PARKINSON DISEASE: Chronic | ICD-10-CM
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
## 2022-11-21 PROCEDURE — 97162 PT EVAL MOD COMPLEX 30 MIN: CPT | Mod: KX

## 2022-11-21 NOTE — PROGRESS NOTES
[facility]  Physical Therapy Initial Evaluation     Name: Usama Lee  Clinic Number: [id number]     Therapy Diagnosis:        Encounter Diagnosis   Name Primary?    Encounter for orthopedic follow-up care        Physician: Angel Guzman NP      Physician Orders: PT Eval and Treat   Medical Diagnosis from Referral: G20 (ICD-10-CM) - Parkinson disease W19.XXXD (ICD-10-CM) - Fall, subsequent encounter   Evaluation Date: 11/21/2022  Plan of Care Expiration: 12/23/2022  Visit # / Visits authorized: 1/ 8     Time In: 10:09 AM  Time Out: 10:45 AM  Total Appointment Time (timed & untimed codes): 37 minutes     Precautions: Standard     Subjective     History of current condition - Mr. Lee has a chronic issue with dynamic balance and ambulation due to Parkinsons which has led to repeated falls and difficulty negotiating on level and unlevel surfaces. He uses a rollator for ambulation and has had repeated falls over the past few months.      Medical History:        Past Medical History:   Diagnosis Date    Diabetes      Gout      Heart disease      High cholesterol      HTN (hypertension)      Parkinson disease      Sleep apnea           Surgical History:   Usama Lee  has a past surgical history that includes Total knee arthroplasty (Right); Trigger finger release (Left, 8/19/2021); Flexor tendon repair (Left, 8/19/2021); and Cervical laminectomy with spinal fusion (N/A, 4/28/2022).     Medications:   Usama has a current medication list which includes the following prescription(s): acetaminophen, allopurinol, apixaban, apixaban, aspirin, atorvastatin, benztropine, carbidopa-levodopa  mg, carvedilol, cyanocobalamin, doxycycline, ferrous sulfate, fluticasone propionate, gabapentin, glipizide, isosorbide mononitrate, memantine, nicotine polacrilex, nitroglycerin, pantoprazole, potassium chloride, promethazine, sertraline, tamsulosin, and triamterene-hydrochlorothiazide 37.5-25 mg.      Allergies:         Review of patient's allergies indicates:   Allergen Reactions    Lovastatin         Other reaction(s): Muscle pain           Prior Therapy: Yes with good results  Social History:  lives alone but has a worker come to do household chores as needed.   Occupation: Retired  Prior Level of Function: Independent        Pain:  Absence of pain reported at time of evaluation.           Objective      Range of motion:  WFL    Manual muscle test   Muscle Right  Left    Hip flexion  MMT strength: 3+/5  MMT strength: 3+/5   Hip extension  MMT strength: 3-/5  MMT strength: 3-/5   Hip abduction  MMT strength: 4/5  MMT strength: 4/5   Hip adduction  MMT strength: 3+/5  MMT strength: 3+/5   Hip internal rotation  MMT strength: 3/5  MMT strength: 3/5   Hip external rotation  MMT strength: 3/5  MMT strength: 3/5   Knee extension  MMT strength: 4/5  MMT strength: 4/5   Knee flexion  MMT strength: 4+/5  MMT strength: 4+/5   Ankle DF  MMT strength: 4+/5  MMT strength: 4+/5   Ankle PF  MMT strength: 4/5  MMT strength: 4+/5   Ankle inversion  MMT strength: 4+/5  MMT strength: 4+/5   Ankle eversion  MMT strength: 4+/5  MMT strength: 4+/5     Other tests/information:  TU secs  DGI:   BERKOWITZ/56     Limitation/Restriction for FOTO  Survey     Therapist reviewed FOTO scores for Usama Lee on 2022.   FOTO documents entered into [employer] - see Media section.     Limitation Score: 40%            Assessment   Usama is a 77 y.o. male referred to outpatient Physical Therapy with a medical diagnosis of Parkinson's Disease  Pt prognosis is Good.   Pt will benefit from skilled outpatient Physical Therapy to address the deficits stated above and in the chart below, provide pt/family education, and to maximize pt's level of independence.      Plan of care discussed with patient: Yes  Pt's spiritual, cultural and educational needs considered and patient is agreeable to the plan of care and goals as stated below:       Anticipated Barriers for therapy: None        Goals:  Short Term Goals: 3 weeks   1. Patient will demonstrate TUG score of 9 seconds without LOB or deviation to path with use of rollator  2. Patient will increase gross MMT of BLE to 4/5 to increase stability with dynamic standing activities.   3. Patient will increase demonstrate G-/F+ dynamic standing balance to reduce overall fall risks     Long Term Goals: 6 weeks   1. Patient will demonstrate DGI score of 19/24  2. Patient will increase FOTO score by 10 points or more relating to function utilizing cervical spine.      Plan   Plan of care Certification:11/21/2022 to 12/23/2022     Outpatient Physical Therapy 2 times weekly for 4 weeks to include the following interventions: Manual Therapy, Moist Heat/ Ice, Neuromuscular Re-ed, Patient Education, Therapeutic Activities and Therapeutic Exercise.      Alber Fowler, PT

## 2022-12-09 ENCOUNTER — CLINICAL SUPPORT (OUTPATIENT)
Dept: REHABILITATION | Facility: HOSPITAL | Age: 77
End: 2022-12-09
Payer: MEDICARE

## 2022-12-09 DIAGNOSIS — G20.A1 PARKINSON DISEASE: Primary | ICD-10-CM

## 2022-12-09 PROCEDURE — 97110 THERAPEUTIC EXERCISES: CPT | Mod: KX

## 2022-12-09 NOTE — PROGRESS NOTES
Physical Therapy Treatment Note     Name: Usama Lee  Clinic Number: [id number]    Therapy Diagnosis:   Encounter Diagnosis   Name Primary?    Parkinson disease Yes     Physician: Charbel Blake MD    Visit Date: 12/9/2022    Physician Orders: PT Eval and Treat   Medical Diagnosis from Referral: G20 (ICD-10-CM) - Parkinson disease W19.XXXD (ICD-10-CM) - Fall, subsequent encounter   Evaluation Date: 11/21/2022  Plan of Care Expiration: 12/23/2022  Visit # / Visits authorized: 1/ 8    Time In: 9:15 AM  Time Out: 9:55 AM   Total Billable Time: 40 minutes    Precautions: Standard  Functional Level Prior to Evaluation:     Subjective     Pt reports: no complaints upon arrival.  He was compliant with home exercise program.    Pain: 0/10  Location:     Objective     Magen received therapeutic exercises to develop strength, ROM, and flexibility for 40 minutes including:  Nu-step x8 mins  HS Curl x40 40#  LAQ x40 30#  Step Ups (6 inch step) x20 BL  Resisted Step Overs 10# x20 BL  Seated Toe Ups (anterior tib) 2x20  Seated Hip Abduction x50 BTB  Seated Heel Ups (gastroc/soleus) 2x20   Leg Pres x40 50#    Home Exercises Provided and Patient Education Provided     Education provided: on correct performance of therapeutic exercises.     Written Home Exercises Provided: yes.  Exercises were reviewed and Magen was able to demonstrate them prior to the end of the session.  Magen demonstrated good  understanding of the education provided.     See EMR under Media for exercises provided prior visit.    Assessment     Patient tolerated tx well. He demonstrates significant weakness and anterior tib affecting his dorsiflexion strength which present as a significant fall risk with ambulation on non level surfaces.   Magen Is progressing well towards his goals.   Pt prognosis is Excellent.     Pt will continue to benefit from skilled outpatient physical therapy to address the deficits listed in the problem list box on initial  evaluation, provide pt/family education and to maximize pt's level of independence in the home and community environment.     Pt's spiritual, cultural and educational needs considered and pt agreeable to plan of care and goals.     Anticipated barriers to physical therapy: none    Goals:    Short Term Goals: 3 weeks   1. Patient will demonstrate TUG score of 9 seconds without LOB or deviation to path with use of rollator  2. Patient will increase gross MMT of BLE to 4/5 to increase stability with dynamic standing activities.   3. Patient will increase demonstrate G-/F+ dynamic standing balance to reduce overall fall risks     Long Term Goals: 6 weeks   1. Patient will demonstrate DGI score of 19/24  2. Patient will increase FOTO score by 10 points or more relating to function utilizing cervical spine.     Plan     Continue with ANGY Fowler, PT  12/9/2022

## 2022-12-13 ENCOUNTER — CLINICAL SUPPORT (OUTPATIENT)
Dept: REHABILITATION | Facility: HOSPITAL | Age: 77
End: 2022-12-13
Payer: MEDICARE

## 2022-12-13 DIAGNOSIS — G20.A1 PARKINSON DISEASE: Primary | ICD-10-CM

## 2022-12-13 PROCEDURE — 97112 NEUROMUSCULAR REEDUCATION: CPT | Mod: KX

## 2022-12-13 PROCEDURE — 97110 THERAPEUTIC EXERCISES: CPT | Mod: KX

## 2022-12-14 RX ORDER — SERTRALINE HYDROCHLORIDE 25 MG/1
25 TABLET, FILM COATED ORAL
COMMUNITY
Start: 2022-09-08

## 2022-12-14 RX ORDER — FLUOROURACIL 50 MG/G
CREAM TOPICAL
COMMUNITY
Start: 2022-11-17

## 2022-12-14 RX ORDER — GABAPENTIN 400 MG/1
1 CAPSULE ORAL
Status: ON HOLD | COMMUNITY
End: 2023-08-01 | Stop reason: HOSPADM

## 2022-12-16 ENCOUNTER — OFFICE VISIT (OUTPATIENT)
Dept: INTERNAL MEDICINE | Facility: CLINIC | Age: 77
End: 2022-12-16
Payer: MEDICARE

## 2022-12-16 VITALS
BODY MASS INDEX: 31.92 KG/M2 | HEIGHT: 71 IN | HEART RATE: 76 BPM | TEMPERATURE: 98 F | WEIGHT: 228 LBS | DIASTOLIC BLOOD PRESSURE: 70 MMHG | OXYGEN SATURATION: 96 % | SYSTOLIC BLOOD PRESSURE: 120 MMHG | RESPIRATION RATE: 18 BRPM

## 2022-12-16 DIAGNOSIS — M15.9 OSTEOARTHRITIS OF MULTIPLE JOINTS, UNSPECIFIED OSTEOARTHRITIS TYPE: ICD-10-CM

## 2022-12-16 DIAGNOSIS — E11.9 CONTROLLED TYPE 2 DIABETES MELLITUS WITHOUT COMPLICATION, WITHOUT LONG-TERM CURRENT USE OF INSULIN: ICD-10-CM

## 2022-12-16 DIAGNOSIS — G20.C PARKINSONISM, UNSPECIFIED PARKINSONISM TYPE: ICD-10-CM

## 2022-12-16 DIAGNOSIS — I10 ESSENTIAL HYPERTENSION: Primary | ICD-10-CM

## 2022-12-16 DIAGNOSIS — I25.10 CORONARY ARTERY DISEASE, UNSPECIFIED VESSEL OR LESION TYPE, UNSPECIFIED WHETHER ANGINA PRESENT, UNSPECIFIED WHETHER NATIVE OR TRANSPLANTED HEART: ICD-10-CM

## 2022-12-16 DIAGNOSIS — E78.5 HYPERLIPIDEMIA LDL GOAL <100: ICD-10-CM

## 2022-12-16 PROCEDURE — 99215 OFFICE O/P EST HI 40 MIN: CPT | Mod: PBBFAC | Performed by: INTERNAL MEDICINE

## 2022-12-16 PROCEDURE — 99214 OFFICE O/P EST MOD 30 MIN: CPT | Mod: S$PBB,,, | Performed by: INTERNAL MEDICINE

## 2022-12-16 PROCEDURE — 99214 PR OFFICE/OUTPT VISIT, EST, LEVL IV, 30-39 MIN: ICD-10-PCS | Mod: S$PBB,,, | Performed by: INTERNAL MEDICINE

## 2022-12-16 NOTE — PROGRESS NOTES
Subjective:       Patient ID: Usama Lee is a 77 y.o. male.    Chief Complaint: Follow-up    Mid w/u for parkinsonism  c/o hands numb  and feet numb  and cannot walk without walker    Follow-up  Pertinent negatives include no arthralgias, change in bowel habit, chest pain, fatigue or rash.   Review of Systems   Constitutional:  Negative for appetite change, fatigue and unexpected weight change.   HENT:  Negative for postnasal drip, trouble swallowing and goiter.    Eyes:  Negative for visual disturbance.   Respiratory:  Negative for apnea, choking and chest tightness.    Cardiovascular:  Negative for chest pain and leg swelling.   Gastrointestinal:  Negative for blood in stool, change in bowel habit and change in bowel habit.   Genitourinary:  Negative for difficulty urinating and frequency.   Musculoskeletal:  Negative for arthralgias, back pain and leg pain.   Integumentary:  Negative for rash.   Neurological:  Negative for coordination difficulties, memory loss and coordination difficulties.   Hematological:  Negative for adenopathy.   Psychiatric/Behavioral:  Negative for agitation. The patient is not hyperactive.        Objective:      Physical Exam  Vitals and nursing note reviewed.   Constitutional:       Appearance: Normal appearance. He is obese.   HENT:      Head: Normocephalic and atraumatic.      Right Ear: Tympanic membrane, ear canal and external ear normal.      Left Ear: Tympanic membrane, ear canal and external ear normal.      Nose: Nose normal.      Mouth/Throat:      Mouth: Mucous membranes are moist.      Pharynx: Oropharynx is clear.   Eyes:      Extraocular Movements: Extraocular movements intact.      Conjunctiva/sclera: Conjunctivae normal.      Pupils: Pupils are equal, round, and reactive to light.   Cardiovascular:      Rate and Rhythm: Normal rate and regular rhythm.      Pulses: Normal pulses.      Heart sounds: Normal heart sounds.   Pulmonary:      Effort: Pulmonary effort is  normal.      Breath sounds: Normal breath sounds.   Abdominal:      General: Abdomen is flat. Bowel sounds are normal.      Palpations: Abdomen is soft.   Musculoskeletal:         General: Normal range of motion.      Cervical back: Normal range of motion.   Skin:     General: Skin is warm and dry.      Capillary Refill: Capillary refill takes less than 2 seconds.   Neurological:      General: No focal deficit present.      Mental Status: He is alert and oriented to person, place, and time.   Psychiatric:         Mood and Affect: Mood normal.         Behavior: Behavior normal.         Thought Content: Thought content normal.         Judgment: Judgment normal.       Assessment:       1. Essential hypertension    2. Hyperlipidemia LDL goal <100    3. Controlled type 2 diabetes mellitus without complication, without long-term current use of insulin    4. Osteoarthritis of multiple joints, unspecified osteoarthritis type    5. Parkinsonism, unspecified Parkinsonism type    6. Coronary artery disease, unspecified vessel or lesion type, unspecified whether angina present, unspecified whether native or transplanted heart          Plan:         Patient Instructions   Continue current meds and f/u 3 months      Problem List Items Addressed This Visit          Orthopedic    Osteoarthritis     Other Visit Diagnoses       Essential hypertension    -  Primary    Hyperlipidemia LDL goal <100        Controlled type 2 diabetes mellitus without complication, without long-term current use of insulin        Parkinsonism, unspecified Parkinsonism type        Coronary artery disease, unspecified vessel or lesion type, unspecified whether angina present, unspecified whether native or transplanted heart

## 2022-12-19 ENCOUNTER — CLINICAL SUPPORT (OUTPATIENT)
Dept: REHABILITATION | Facility: HOSPITAL | Age: 77
End: 2022-12-19
Payer: MEDICARE

## 2022-12-19 DIAGNOSIS — G20.A1 PARKINSON DISEASE: Primary | ICD-10-CM

## 2022-12-19 PROCEDURE — 97110 THERAPEUTIC EXERCISES: CPT | Mod: CQ

## 2022-12-19 NOTE — PROGRESS NOTES
Physical Therapy Treatment Note     Name: Usama Lee  Clinic Number: [id number]    Therapy Diagnosis:   Encounter Diagnosis   Name Primary?    Parkinson disease Yes     Physician: Angel Guzman NP    Visit Date: 12/19/2022    Physician Orders: PT Eval and Treat   Medical Diagnosis from Referral: G20 (ICD-10-CM) - Parkinson disease W19.XXXD (ICD-10-CM) - Fall, subsequent encounter   Evaluation Date: 11/21/2022  Plan of Care Expiration: 12/23/2022  Visit # / Visits authorized: 4/ 8    Time In: 1030 AM  Time Out: 1100    AM   Total Billable Time: 30  minutes    Precautions: Standard  Functional Level Prior to Evaluation:     Subjective     Pt reports: no complaints upon arrival.  He was compliant with home exercise program.    Pain: 0/10  Location:     Objective     Magen received therapeutic exercises to develop strength, ROM, and flexibility for  30 minutes including:  Nu-step x8 mins  HS Curl x40 40#  LAQ x40 30#  Resisted PF with BTB 2x10  Step Ups (6 inch step) x20 BL  Resisted Step Overs 10# x20 BL  Standing mini squats 1x10  Standing BLE abd 1x10   Seated Toe Ups (anterior tib) 2x20 with 3# cw  Seated Hip Abduction x50 BTB  Seated Heel Ups (gastroc/soleus) 2x20   Leg Pres x30 70#    Home Exercises Provided and Patient Education Provided     Education provided: on correct performance of therapeutic exercises.     Written Home Exercises Provided: yes.  Exercises were reviewed and Magen was able to demonstrate them prior to the end of the session.  Magen demonstrated good  understanding of the education provided.     See EMR under Media for exercises provided prior visit.    Assessment     Patient tolerated tx well. He is progressing with dynamic balance and less occurences of buckling in LE during ambulation  Magen Is progressing well towards his goals.   Pt prognosis is Excellent.     Pt will continue to benefit from skilled outpatient physical therapy to address the deficits listed in the problem  list box on initial evaluation, provide pt/family education and to maximize pt's level of independence in the home and community environment.     Pt's spiritual, cultural and educational needs considered and pt agreeable to plan of care and goals.     Anticipated barriers to physical therapy: none    Goals:    Short Term Goals: 3 weeks   1. Patient will demonstrate TUG score of 9 seconds without LOB or deviation to path with use of rollator  2. Patient will increase gross MMT of BLE to 4/5 to increase stability with dynamic standing activities.   3. Patient will increase demonstrate G-/F+ dynamic standing balance to reduce overall fall risks     Long Term Goals: 6 weeks   1. Patient will demonstrate DGI score of 19/24  2. Patient will increase FOTO score by 10 points or more relating to function utilizing cervical spine.     Plan     Continue with POC    Magdalena Jacob, PTA  12/19/2022

## 2022-12-21 ENCOUNTER — HOSPITAL ENCOUNTER (OUTPATIENT)
Dept: RADIOLOGY | Facility: HOSPITAL | Age: 77
Discharge: HOME | End: 2022-12-21
Attending: NURSE PRACTITIONER
Payer: MEDICARE

## 2022-12-21 DIAGNOSIS — G20.A1 PARKINSON DISEASE: ICD-10-CM
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
## 2022-12-21 PROCEDURE — 70450 CT HEAD/BRAIN W/O DYE: CPT | Mod: TC

## 2022-12-21 PROCEDURE — 70450 CT HEAD WITHOUT CONTRAST: ICD-10-PCS | Mod: 26,,, | Performed by: RADIOLOGY

## 2022-12-21 PROCEDURE — 70450 CT HEAD/BRAIN W/O DYE: CPT | Mod: 26,,, | Performed by: RADIOLOGY

## 2022-12-22 ENCOUNTER — TELEPHONE (OUTPATIENT)
Dept: NEUROLOGY | Facility: CLINIC | Age: 77
End: 2022-12-22
Payer: MEDICARE

## 2022-12-22 NOTE — TELEPHONE ENCOUNTER
Left VM  ----- Message from Angel Guzman NP sent at 12/21/2022  5:08 PM CST -----  Please let patient know that his CT of the head showed No evidence of acute process or interval change.  Thanks

## 2022-12-28 ENCOUNTER — CLINICAL SUPPORT (OUTPATIENT)
Dept: REHABILITATION | Facility: HOSPITAL | Age: 77
End: 2022-12-28
Payer: MEDICARE

## 2022-12-28 DIAGNOSIS — G20.A1 PARKINSON DISEASE: Primary | ICD-10-CM

## 2022-12-28 PROCEDURE — 97110 THERAPEUTIC EXERCISES: CPT | Mod: KX

## 2022-12-28 NOTE — PROGRESS NOTES
Physical Therapy Treatment Note     Name: Usama Lee  Clinic Number: [id number]    Therapy Diagnosis:   Encounter Diagnosis   Name Primary?    Parkinson disease Yes     Physician: Angel Guzman NP    Visit Date: 12/28/2022    Physician Orders: PT Eval and Treat   Medical Diagnosis from Referral: G20 (ICD-10-CM) - Parkinson disease W19.XXXD (ICD-10-CM) - Fall, subsequent encounter   Evaluation Date: 11/21/2022  Plan of Care Expiration: 12/23/2022  Visit # / Visits authorized: 5/ 8    Time In: 9:30 AM  Time Out: 10:10   AM   Total Billable Time: 40  minutes    Precautions: Standard  Functional Level Prior to Evaluation:     Subjective     Pt reports: no complaints upon arrival.  He was compliant with home exercise program.    Pain: 0/10  Location:     Objective     Magen received therapeutic exercises to develop strength, ROM, and flexibility for  40 minutes including:  Nu-step x8 mins  HS Curl x40 40#  LAQ x40 30#  Resisted PF with BTB 2x10  Step Ups (6 inch step) x20 BL 4#  Resisted Step Overs 10# x20 BL  Standing mini squats 1x10  Standing BLE abd 1x10   Seated Toe Ups (anterior tib) 2x20 with 3# cw  Seated Hip Abduction x50 BTB  Seated Heel Ups (gastroc/soleus) 2x20   Leg Pres x30 70#    Home Exercises Provided and Patient Education Provided     Education provided: on correct performance of therapeutic exercises.     Written Home Exercises Provided: yes.  Exercises were reviewed and Magen was able to demonstrate them prior to the end of the session.  Magen demonstrated good  understanding of the education provided.     See EMR under Media for exercises provided prior visit.    Assessment     Patient tolerated tx well. He is progressing with dynamic balance and less occurences of buckling in LE during ambulation  Magen Is progressing well towards his goals.   Pt prognosis is Excellent.     Pt will continue to benefit from skilled outpatient physical therapy to address the deficits listed in the  problem list box on initial evaluation, provide pt/family education and to maximize pt's level of independence in the home and community environment.     Pt's spiritual, cultural and educational needs considered and pt agreeable to plan of care and goals.     Anticipated barriers to physical therapy: none    Goals:    Short Term Goals: 3 weeks   1. Patient will demonstrate TUG score of 9 seconds without LOB or deviation to path with use of rollator  2. Patient will increase gross MMT of BLE to 4/5 to increase stability with dynamic standing activities.   3. Patient will increase demonstrate G-/F+ dynamic standing balance to reduce overall fall risks     Long Term Goals: 6 weeks   1. Patient will demonstrate DGI score of 19/24  2. Patient will increase FOTO score by 10 points or more relating to function utilizing cervical spine.     Plan     Continue with ANGY Fowler, PT  12/28/2022

## 2022-12-29 ENCOUNTER — CLINICAL SUPPORT (OUTPATIENT)
Dept: REHABILITATION | Facility: HOSPITAL | Age: 77
End: 2022-12-29
Payer: MEDICARE

## 2022-12-29 DIAGNOSIS — G20.A1 PARKINSON DISEASE: Primary | ICD-10-CM

## 2022-12-29 PROCEDURE — 97110 THERAPEUTIC EXERCISES: CPT | Mod: KX

## 2022-12-29 NOTE — PROGRESS NOTES
Physical Therapy Treatment Note     Name: Usama Lee  Clinic Number: [id number]    Therapy Diagnosis:   Encounter Diagnosis   Name Primary?    Parkinson disease Yes     Physician: Angel Guzman NP    Visit Date: 12/29/2022    Physician Orders: PT Eval and Treat   Medical Diagnosis from Referral: G20 (ICD-10-CM) - Parkinson disease W19.XXXD (ICD-10-CM) - Fall, subsequent encounter   Evaluation Date: 11/21/2022  Plan of Care Expiration: 12/23/2022  Visit # / Visits authorized: 7/ 8    Time In: 9:30 AM  Time Out: 10:10   AM   Total Billable Time: 40  minutes    Precautions: Standard  Functional Level Prior to Evaluation:     Subjective     Pt reports: no complaints upon arrival.  He was compliant with home exercise program.    Pain: 0/10  Location:     Objective     Magen received therapeutic exercises to develop strength, ROM, and flexibility for  40 minutes including:  Nu-step x8 mins  HS Curl x40 40#  LAQ x40 30#  Resisted PF with BTB 2x10  Step Ups (6 inch step) x20 BL 4#  Resisted Step Overs 10# x20 BL  Standing mini squats 1x10  Standing BLE abd 1x10   Seated Toe Ups (anterior tib) 2x20 with 3# cw  Seated Hip Abduction x50 BTB  Seated Heel Ups (gastroc/soleus) 2x20   Leg Pres x30 70#    Home Exercises Provided and Patient Education Provided     Education provided: on correct performance of therapeutic exercises.     Written Home Exercises Provided: yes.  Exercises were reviewed and Magen was able to demonstrate them prior to the end of the session.  Magen demonstrated good  understanding of the education provided.     See EMR under Media for exercises provided prior visit.    Assessment     Patient assessed for all set goals and will be discharged on this date due to meeting all set functional goals.    Goals: All Goals were achieved    Short Term Goals: 3 weeks   1. Patient will demonstrate TUG score of 9 seconds without LOB or deviation to path with use of rollator  2. Patient will increase gross  MMT of BLE to 4/5 to increase stability with dynamic standing activities.   3. Patient will increase demonstrate G-/F+ dynamic standing balance to reduce overall fall risks     Long Term Goals: 6 weeks   1. Patient will demonstrate DGI score of 19/24  2. Patient will increase FOTO score by 10 points or more relating to function utilizing cervical spine.     Plan   Discharge    Alber Fowler, PT  12/29/2022

## 2023-03-01 PROCEDURE — 88305 PATHOLOGY, DERMATOLOGY: ICD-10-PCS | Mod: 26,,, | Performed by: PATHOLOGY

## 2023-03-01 PROCEDURE — 88305 TISSUE EXAM BY PATHOLOGIST: CPT | Mod: TC,SUR | Performed by: DERMATOLOGY

## 2023-03-01 PROCEDURE — 88305 TISSUE EXAM BY PATHOLOGIST: CPT | Mod: 26,,, | Performed by: PATHOLOGY

## 2023-03-02 ENCOUNTER — LAB REQUISITION (OUTPATIENT)
Dept: LAB | Facility: HOSPITAL | Age: 78
End: 2023-03-02
Attending: DERMATOLOGY
Payer: MEDICARE

## 2023-03-02 DIAGNOSIS — D49.2 NEOPLASM OF UNSPECIFIED BEHAVIOR OF BONE, SOFT TISSUE, AND SKIN: ICD-10-CM

## 2023-03-03 LAB
ESTROGEN SERPL-MCNC: NORMAL PG/ML
INSULIN SERPL-ACNC: NORMAL U[IU]/ML
LAB AP GROSS DESCRIPTION: NORMAL
LAB AP LABORATORY NOTES: NORMAL
LAB AP SPEC A DDX: NORMAL
LAB AP SPEC A MORPHOLOGY: NORMAL
LAB AP SPEC A PROCEDURE: NORMAL
LAB AP SPEC B DDX: NORMAL
LAB AP SPEC B MORPHOLOGY: NORMAL
LAB AP SPEC B PROCEDURE: NORMAL
T3RU NFR SERPL: NORMAL %

## 2023-03-17 ENCOUNTER — TELEPHONE (OUTPATIENT)
Dept: NEUROLOGY | Facility: CLINIC | Age: 78
End: 2023-03-17
Payer: MEDICARE

## 2023-03-17 NOTE — TELEPHONE ENCOUNTER
Pt did not answer, left voicemail stating that his LYNDSEY scan was negative but he does have symptoms of parkinson's. We want him to continue his Sinemet and would also like to get a second opinion. If he is okay with a referral to a Dr. Please give a call back at [phone].

## 2023-03-20 ENCOUNTER — OFFICE VISIT (OUTPATIENT)
Dept: SPINE | Facility: CLINIC | Age: 78
End: 2023-03-20
Payer: MEDICARE

## 2023-03-20 ENCOUNTER — HOSPITAL ENCOUNTER (OUTPATIENT)
Dept: RADIOLOGY | Facility: HOSPITAL | Age: 78
Discharge: HOME | End: 2023-03-20
Attending: ORTHOPAEDIC SURGERY
Payer: MEDICARE

## 2023-03-20 DIAGNOSIS — Z09 FOLLOW-UP EXAMINATION AFTER ORTHOPEDIC SURGERY: ICD-10-CM

## 2023-03-20 DIAGNOSIS — Z09 FOLLOW-UP EXAMINATION AFTER ORTHOPEDIC SURGERY: Primary | ICD-10-CM

## 2023-03-20 DIAGNOSIS — M54.16 LUMBAR RADICULOPATHY, CHRONIC: ICD-10-CM

## 2023-03-20 DIAGNOSIS — G56.03 CARPAL TUNNEL SYNDROME, BILATERAL: Primary | ICD-10-CM

## 2023-03-20 DIAGNOSIS — G95.9 CERVICAL MYELOPATHY: ICD-10-CM

## 2023-03-20 DIAGNOSIS — M65.30 TRIGGER FINGER, UNSPECIFIED FINGER, UNSPECIFIED LATERALITY: ICD-10-CM

## 2023-03-20 DIAGNOSIS — M50.30 DDD (DEGENERATIVE DISC DISEASE), CERVICAL: ICD-10-CM

## 2023-03-20 PROCEDURE — 99214 PR OFFICE/OUTPT VISIT, EST, LEVL IV, 30-39 MIN: ICD-10-PCS | Mod: S$PBB,,, | Performed by: ORTHOPAEDIC SURGERY

## 2023-03-20 PROCEDURE — 99214 OFFICE O/P EST MOD 30 MIN: CPT | Mod: S$PBB,,, | Performed by: ORTHOPAEDIC SURGERY

## 2023-03-20 PROCEDURE — 99212 OFFICE O/P EST SF 10 MIN: CPT | Mod: PBBFAC | Performed by: ORTHOPAEDIC SURGERY

## 2023-03-20 PROCEDURE — 72040 X-RAY EXAM NECK SPINE 2-3 VW: CPT | Mod: 26,,, | Performed by: ORTHOPAEDIC SURGERY

## 2023-03-20 PROCEDURE — 72040 X-RAY EXAM NECK SPINE 2-3 VW: CPT | Mod: TC

## 2023-03-20 PROCEDURE — 72040 XR CERVICAL SPINE AP LATERAL: ICD-10-PCS | Mod: 26,,, | Performed by: ORTHOPAEDIC SURGERY

## 2023-03-22 NOTE — PROGRESS NOTES
MDM/time:  Greater than 30 minutes spent on this encounter including 10 minutes reviewing imaging and notes, 15 minutes with the patient, 5 minutes documentation    ASSESSMENT:  77 y.o. male with cervical spondylosis and myelopathy now status post C3-C7 laminectomy and fusion 04/28/2022    PLAN:  Follow-up in 1 year.  Follow-up with Dr. Dempsey for bilateral hands    HPI:  77 y.o. male here for repeat evaluation of cervical spondylosis and myelopathy now status post C3-C7 laminectomy and fusion 04/28/2022.  He has completed physical therapy.  His neck is doing well.  Reports that he has numbness in bilateral thumb and index fingers of the past 2 months.  Also has bilateral trigger finger    IMAGING:  X-ray cervical spine reviewed show:  On the AP there is normal coronal alignment.  There is uncovertebral and facet hypertrophy seen.  On the lateral there is loss of cervical lordosis.  There are spondylotic changes noted with decrease in disc height and osteophyte formation seen.  Status post C3-C7 laminectomy and fusion.      Past Medical History:   Diagnosis Date    Diabetes     Gout     Heart disease     High cholesterol     HTN (hypertension)     Parkinson disease     Sleep apnea      Past Surgical History:   Procedure Laterality Date    APPENDECTOMY      CARDIAC SURGERY      CERVICAL LAMINECTOMY WITH SPINAL FUSION N/A 04/28/2022    Procedure: C3-C7 Laminectomy and Fusion;  Surgeon: Charbel Blake MD;  [facility]: [facility] OR;  Service: Neurosurgery;  Laterality: N/A;  Neuro monitoring    FLEXOR TENDON REPAIR Left 08/19/2021    Procedure: REPAIR, TENDON, FLEXOR;  Surgeon: Daniel Dempsey MD;  Location: [facility] OR;  Service: Orthopedics;  Laterality: Left;    KNEE SURGERY      TOTAL KNEE ARTHROPLASTY Right     TRIGGER FINGER RELEASE Left 08/19/2021    Procedure: RELEASE, TRIGGER FINGER,RING;  Surgeon: Daniel Dempsey MD;  Location: [facility] OR;  Service: Orthopedics;  Laterality: Left;      Social History     Tobacco Use    Smoking status: Never    Smokeless tobacco: Never   Substance Use Topics    Alcohol use: Not Currently    Drug use: Never      Current Outpatient Medications   Medication Instructions    acetaminophen (TYLENOL) 650 mg, Oral, Daily, prn    allopurinoL (ZYLOPRIM) 300 MG tablet TAKE 1 TABLET(S) BY MOUTH DAILY    apixaban (ELIQUIS) 5 mg, 2 times daily    aspirin (ECOTRIN) 81 MG EC tablet 1 tablet, Oral, Daily    atorvastatin (LIPITOR) 40 MG tablet 1 tablet    atorvastatin (LIPITOR) 80 MG tablet TAKE ONE-HALF TABLET BY MOUTH DAILY FOR CHOLESTEROL. STOP MEDICATION AND CALL PROVIDER FOR ANY UNEXPLAINED MUSCLE ACHES,PAIN OR WEAKNESS    benztropine (COGENTIN) 1 MG tablet 1 tablet, Oral, Nightly    carbidopa-levodopa  mg (SINEMET)  mg per tablet 1 tablet, Oral, 3 times daily    carvediloL (COREG) 25 mg, Oral    carvediloL (COREG) 6.25 mg, Oral, 2 times daily    cyanocobalamin 500 MCG tablet 2 tablets    cyclobenzaprine (FLEXERIL) 5 mg, Oral, 3 times daily PRN    doxycycline (MONODOX) 100 MG capsule TAKE ONE CAPSULE BY MOUTH DAILY WITH FOOD    famotidine (PEPCID) 20 mg    ferrous sulfate (FEOSOL) 325 mg (65 mg iron) Tab tablet Oral    fluorouraciL (EFUDEX) 5 % cream APPLY TWICE DAILY TO LEFT cheeck FOR SIX WEEKS, WASH off EVERY MORNING    fluticasone propionate (FLONASE) 50 mcg/actuation nasal spray INSTILL 1 SPRAY IN NOSTRIL(S) DAILY    furosemide (LASIX) 40 mg, Oral, 2 times daily    gabapentin (NEURONTIN) 400 MG capsule 1 capsule    gabapentin (NEURONTIN) 400 mg, Oral    glipiZIDE (GLUCOTROL) 5 mg, 2 times daily with meals    isosorbide mononitrate (IMDUR) 30 MG 24 hr tablet 1 tablet, Oral, Daily    memantine (NAMENDA) 10 mg, Oral, Daily    nicotine polacrilex 2 MG Lozg DISSOLVE 1 LOZENGE IN MOUTH EVERY 2 HOURS AS NEEDED FOR SMOKING CESSATION    nitroGLYCERIN (NITROSTAT) 0.4 MG SL tablet DISSOLVE ONE TABLET UNDER THE TONGUE EVERY 5 MINUTES AS NEEDED<BR>Strength: 0.4 mg     pantoprazole (PROTONIX) 40 mg    potassium chloride (KLOR-CON) 10 MEQ TbSR 10 mEq, Oral, Daily    promethazine (PHENERGAN) 25 mg, Oral, Every 4 hours    sertraline (ZOLOFT) 50 mg, Oral, Daily    sertraline (ZOLOFT) 25 mg, Oral    SOOLANTRA 1 % Crea Each Nostril, Daily    sulfamethoxazole-trimethoprim 800-160mg (BACTRIM DS) 800-160 mg Tab 1 tablet, Oral, 2 times daily    tamsulosin (FLOMAX) 0.4 mg, Oral    traZODone (DESYREL) 50 mg    triamterene-hydrochlorothiazide 37.5-25 mg (DYAZIDE) 37.5-25 mg per capsule 1 capsule, Oral, Every morning        EXAM:  Constitutional  General Appearance:  There is no height or weight on file to calculate BMI., NAD  Psychiatric   Orientation: Oriented to time, oriented to place, oriented to person  Mood and Affect: Active and alert, normal mood, normal affect  Gait and Station   Appearance:  Normal gait, normal tandem gait, able to walk on toes, able to walk on heels  Healed incision  5/5 strength  Sensation intact  2+ pulses

## 2023-03-29 ENCOUNTER — OFFICE VISIT (OUTPATIENT)
Dept: NEUROLOGY | Facility: CLINIC | Age: 78
End: 2023-03-29
Payer: MEDICARE

## 2023-03-29 VITALS
OXYGEN SATURATION: 94 % | HEIGHT: 71 IN | WEIGHT: 237.81 LBS | HEART RATE: 76 BPM | DIASTOLIC BLOOD PRESSURE: 78 MMHG | SYSTOLIC BLOOD PRESSURE: 138 MMHG | BODY MASS INDEX: 33.29 KG/M2

## 2023-03-29 DIAGNOSIS — G25.0 BENIGN ESSENTIAL TREMOR: Primary | ICD-10-CM

## 2023-03-29 PROBLEM — G20.A1 PARKINSON DISEASE: Chronic | Status: RESOLVED | Noted: 2022-04-28 | Resolved: 2023-03-29

## 2023-03-29 PROCEDURE — 99215 OFFICE O/P EST HI 40 MIN: CPT | Mod: PBBFAC | Performed by: PSYCHIATRY & NEUROLOGY

## 2023-03-29 PROCEDURE — 99214 OFFICE O/P EST MOD 30 MIN: CPT | Mod: S$PBB,,, | Performed by: PSYCHIATRY & NEUROLOGY

## 2023-03-29 PROCEDURE — 99214 PR OFFICE/OUTPT VISIT, EST, LEVL IV, 30-39 MIN: ICD-10-PCS | Mod: S$PBB,,, | Performed by: PSYCHIATRY & NEUROLOGY

## 2023-03-29 RX ORDER — PRIMIDONE 50 MG/1
50 TABLET ORAL 3 TIMES DAILY
Qty: 270 TABLET | Refills: 3 | Status: SHIPPED | OUTPATIENT
Start: 2023-03-29 | End: 2024-03-28

## 2023-03-29 NOTE — PATIENT INSTRUCTIONS
Taper off sinemet over next month- NO PARKINSON'S DISEASE per LYNDSEY scan  Meds reconciliation for polypharmacy-ask PCP if can stop any unnecessary meds  Trial Primidone 50 mg tid   F/u 3 months

## 2023-03-29 NOTE — PROGRESS NOTES
Subjective:       Patient ID: Usama Lee is a 77 y.o. male     Chief Complaint:    Chief Complaint   Patient presents with    Follow-up        Allergies:  Codeine and Lovastatin    Current Medications:    Outpatient Encounter Medications as of 3/29/2023   Medication Sig Dispense Refill    acetaminophen (TYLENOL) 650 MG TbSR Take 650 mg by mouth Daily. prn      allopurinoL (ZYLOPRIM) 300 MG tablet TAKE 1 TABLET(S) BY MOUTH DAILY      apixaban (ELIQUIS) 5 mg Tab 5 mg 2 (two) times daily.      aspirin (ECOTRIN) 81 MG EC tablet Take 1 tablet by mouth once daily.      atorvastatin (LIPITOR) 40 MG tablet 1 tablet.      atorvastatin (LIPITOR) 80 MG tablet TAKE ONE-HALF TABLET BY MOUTH DAILY FOR CHOLESTEROL. STOP MEDICATION AND CALL PROVIDER FOR ANY UNEXPLAINED MUSCLE ACHES,PAIN OR WEAKNESS      benztropine (COGENTIN) 1 MG tablet Take 1 tablet by mouth nightly.      carbidopa-levodopa  mg (SINEMET)  mg per tablet Take 1 tablet by mouth 3 (three) times daily.      carvediloL (COREG) 25 MG tablet Take 25 mg by mouth.      carvediloL (COREG) 6.25 MG tablet Take 6.25 mg by mouth 2 (two) times daily.      cyanocobalamin 500 MCG tablet 2 tablets      cyclobenzaprine (FLEXERIL) 5 MG tablet Take 5 mg by mouth 3 (three) times daily as needed for Muscle spasms.      doxycycline (MONODOX) 100 MG capsule TAKE ONE CAPSULE BY MOUTH DAILY WITH FOOD      famotidine (PEPCID) 20 MG tablet 20 mg.      ferrous sulfate (FEOSOL) 325 mg (65 mg iron) Tab tablet Take by mouth.      fluorouraciL (EFUDEX) 5 % cream APPLY TWICE DAILY TO LEFT cheeck FOR SIX WEEKS, WASH off EVERY MORNING      fluticasone propionate (FLONASE) 50 mcg/actuation nasal spray INSTILL 1 SPRAY IN NOSTRIL(S) DAILY      furosemide (LASIX) 40 MG tablet Take 1 tablet (40 mg total) by mouth 2 (two) times daily. 60 tablet 11    gabapentin (NEURONTIN) 400 MG capsule Take 400 mg by mouth.      gabapentin (NEURONTIN) 400 MG capsule 1 capsule.      glipiZIDE  (GLUCOTROL) 5 MG tablet 5 mg 2 (two) times daily with meals.      isosorbide mononitrate (IMDUR) 30 MG 24 hr tablet Take 1 tablet by mouth once daily.      memantine (NAMENDA) 10 MG Tab Take 1 tablet (10 mg total) by mouth once daily. 90 tablet 3    nicotine polacrilex 2 MG Lozg DISSOLVE 1 LOZENGE IN MOUTH EVERY 2 HOURS AS NEEDED FOR SMOKING CESSATION      nitroGLYCERIN (NITROSTAT) 0.4 MG SL tablet DISSOLVE ONE TABLET UNDER THE TONGUE EVERY 5 MINUTES AS NEEDED  Strength: 0.4 mg 100 tablet 3    pantoprazole (PROTONIX) 40 MG tablet 40 mg.      potassium chloride (KLOR-CON) 10 MEQ TbSR Take 1 tablet (10 mEq total) by mouth once daily. 90 tablet 3    promethazine (PHENERGAN) 25 MG tablet Take 1 tablet (25 mg total) by mouth every 4 (four) hours. 45 tablet 0    sertraline (ZOLOFT) 25 MG tablet Take 25 mg by mouth.      sertraline (ZOLOFT) 50 MG tablet Take 1 tablet (50 mg total) by mouth once daily. 90 tablet 3    SOOLANTRA 1 % Crea by Each Nostril route once daily.      sulfamethoxazole-trimethoprim 800-160mg (BACTRIM DS) 800-160 mg Tab Take 1 tablet by mouth 2 (two) times daily.      tamsulosin (FLOMAX) 0.4 mg Cap Take 0.4 mg by mouth.      traZODone (DESYREL) 100 MG tablet 50 mg.      triamterene-hydrochlorothiazide 37.5-25 mg (DYAZIDE) 37.5-25 mg per capsule Take 1 capsule by mouth every morning.       No facility-administered encounter medications on file as of 3/29/2023.       History of Present Illness  78 yo WM prev dx w/ PARKINSON'S DISEASE and prev on Sinemet but recent LYNDSEY scan was negative for PARKINSON'S DISEASE and had NL uptake w/ no overt underlying parkinson features- his symptoms have been c/w benign essential tremors - b/l trmeors w/orse w/ intention and posture and oscillating speech and quivering lips    He was erroneously dx about 12-15 yrs w/ PARKINSON'S DISEASE and  has been on Sinemet since although it should not be started prior to age 70 due to incr risk of extrapyramidal symptoms from use of  such   Additionally pt has taken his Sinemet improperly by taking one dose at bedtime, as movement d/o never occur while we sleep, and taking them w/ meals which decr efficacy as they are protein bound and should be taken one hour prior to meals   He is also on polypharmacy which should be minmized in elderly  Also ambulation problems improved secondary to surgery on spine but also w/ paresthesias from DM periph neuropathy         Past Medical History:   Diagnosis Date    Diabetes     Gout     Heart disease     High cholesterol     HTN (hypertension)     Parkinson disease     Sleep apnea        Past Surgical History:   Procedure Laterality Date    APPENDECTOMY      CARDIAC SURGERY      CERVICAL LAMINECTOMY WITH SPINAL FUSION N/A 04/28/2022    Procedure: C3-C7 Laminectomy and Fusion;  Surgeon: Charbel Blake MD;  Location: [facility] OR;  Service: Neurosurgery;  Laterality: N/A;  Neuro monitoring    FLEXOR TENDON REPAIR Left 08/19/2021    Procedure: REPAIR, TENDON, FLEXOR;  Surgeon: Daniel Dempsey MD;  Location: [facility] OR;  Service: Orthopedics;  Laterality: Left;    KNEE SURGERY      TOTAL KNEE ARTHROPLASTY Right     TRIGGER FINGER RELEASE Left 08/19/2021    Procedure: RELEASE, TRIGGER FINGER,RING;  Surgeon: Daniel Dempsey MD;  Location: [facility] OR;  Service: Orthopedics;  Laterality: Left;       Social History  Mr. Lee  reports that he has never smoked. He has never used smokeless tobacco. He reports that he does not currently use alcohol. He reports that he does not use drugs.    Family History  Mr.'s Lee family history is not on file.    Review of Systems  Review of Systems   Musculoskeletal:  Positive for back pain, joint pain and neck pain.   Neurological:  Positive for tingling and sensory change.   Psychiatric/Behavioral:  Positive for depression.    All other systems reviewed and are negative.   Objective:   /78 (BP Location: Left arm, Patient Position: Sitting, BP  "Method: Large (Manual))   Pulse 76   Ht 5' 11" (1.803 m)   Wt 107.9 kg (237 lb 12.8 oz)   SpO2 (!) 94%   BMI 33.17 kg/m²    NEUROLOGICAL EXAMINATION:     MENTAL STATUS   Oriented to person, place, and time.   Speech: slurred   Level of consciousness: alert  Knowledge: good.     CRANIAL NERVES   Cranial nerves II through XII intact.     MOTOR EXAM     Strength   Strength 5/5 throughout.        B/l hand tremors     SENSORY EXAM        Paresthesias in ext's     GAIT AND COORDINATION     Tremor   Intention tremor: present  Action tremor: left arm and right arm       Slow and antalgic using walker       Physical Exam  Constitutional:       Appearance: He is normal weight.   Neurological:      General: No focal deficit present.      Mental Status: He is alert and oriented to person, place, and time. Mental status is at baseline.      Cranial Nerves: Cranial nerves 2-12 are intact.      Motor: Motor strength is normal.   Psychiatric:         Speech: Speech is slurred.        Assessment:     Benign essential tremor         Primary Diagnosis and ICD10  Benign essential tremor [G25.0]    Plan:     Patient Instructions   Taper off sinemet over next month- NO PARKINSON'S DISEASE per LYNDSEY scan  Meds reconciliation for polypharmacy-ask PCP if can stop any unnecessary meds  Trial Primidone 50 mg tid   F/u 3 months     There are no discontinued medications.    Requested Prescriptions      No prescriptions requested or ordered in this encounter       "

## 2023-03-31 DIAGNOSIS — M79.642 BILATERAL HAND PAIN: Primary | ICD-10-CM

## 2023-03-31 DIAGNOSIS — M79.641 BILATERAL HAND PAIN: Primary | ICD-10-CM

## 2023-04-03 ENCOUNTER — HOSPITAL ENCOUNTER (OUTPATIENT)
Dept: RADIOLOGY | Facility: HOSPITAL | Age: 78
Discharge: HOME | End: 2023-04-03
Attending: ORTHOPAEDIC SURGERY
Payer: MEDICARE

## 2023-04-03 ENCOUNTER — OFFICE VISIT (OUTPATIENT)
Dept: ORTHOPEDICS | Facility: CLINIC | Age: 78
End: 2023-04-03
Payer: MEDICARE

## 2023-04-03 DIAGNOSIS — G56.03 BILATERAL CARPAL TUNNEL SYNDROME: Primary | ICD-10-CM

## 2023-04-03 DIAGNOSIS — M79.641 BILATERAL HAND PAIN: ICD-10-CM

## 2023-04-03 DIAGNOSIS — M79.642 BILATERAL HAND PAIN: ICD-10-CM

## 2023-04-03 DIAGNOSIS — M65.30 TRIGGER FINGER OF LEFT HAND, UNSPECIFIED FINGER: ICD-10-CM

## 2023-04-03 PROCEDURE — 73130 XR HAND COMPLETE 3 VIEWS BILATERAL: ICD-10-PCS | Mod: 26,50,, | Performed by: ORTHOPAEDIC SURGERY

## 2023-04-03 PROCEDURE — 99213 OFFICE O/P EST LOW 20 MIN: CPT | Mod: PBBFAC | Performed by: NURSE PRACTITIONER

## 2023-04-03 PROCEDURE — 73130 X-RAY EXAM OF HAND: CPT | Mod: TC,50

## 2023-04-03 PROCEDURE — 99213 PR OFFICE/OUTPT VISIT, EST, LEVL III, 20-29 MIN: ICD-10-PCS | Mod: S$PBB,,, | Performed by: NURSE PRACTITIONER

## 2023-04-03 PROCEDURE — 99213 OFFICE O/P EST LOW 20 MIN: CPT | Mod: S$PBB,,, | Performed by: NURSE PRACTITIONER

## 2023-04-03 PROCEDURE — 73130 X-RAY EXAM OF HAND: CPT | Mod: 26,50,, | Performed by: ORTHOPAEDIC SURGERY

## 2023-04-03 NOTE — PROGRESS NOTES
77-year-old male patient presents ambulatory to orthopedic clinic with the aid of a Rollator.  He is known to orthopedic clinic being followed for a right total knee replacement arthroplasty approximately 8 half years ago.  He is well pleased with the results of his knee.  He presents today complaint of bilateral hand numbness and soreness in the index finger of his non dominant left hand.  States symptoms began approximately 6 months ago.  He states 1 year ago he had surgery on his cervical spine.  He denies increased discomfort for as a tingling goes.  States it is numb all the time.  It does not bother him to drive, hold a phone or a drink.  States his only discomfort during hours of sleep occurs when he wakes in the morning in his index finger of his left hand is tight and he has to work to get it loose again.  He does state he has had trigger finger release surgery on 1 of his hands in the past by Dr. Dempsey.  He does not however, remember which hand was.  States the symptoms are not similar to the carpal tunnel syndrome.  Denies injury or trauma.      X-ray:  X-rays today of bilateral hands AP, oblique view reviewed by Dr. Dempsey.      On review of the x-ray shows no evidence of acute fracture, dislocation or pathologic bone.  There is DJD changes noted.  Basilar joint DJD bilaterally.  It is more prominent at the left basilar joint.    PE:  Again is noted be ambulating with the aid of a Rollator.  No acute distress noted.  Physical exam of the bilateral wrist has normal motion all planes of elicitation of pain except for volar flexion bilaterally is somewhat limited.  Motion is equal bilaterally.  Phalen sign does not reproduce symptoms.  Tinel sign is negative over the median nerve wrist bilaterally.  Bilateral radial pulse is 2 out of 4.  Capillary refill all digits of both hands less than 3 seconds.      Impression:  1.)  Cervical radiculopathy versus peripheral nerve entrapment 2.)  Trigger  finger-left index     Plan:  Safety guidelines and activity restrictions are discussed with the patient.  Verbalizes understanding.  We will send him for EMG nerve conduction studies bilateral upper extremities to rule out cervical radiculopathy versus peripheral nerve entrapment.  I have given him Band-Aids and instructed him on placed in the Band-Aids over the PIP joint of the index finger during HS.  Verbalizes understanding.  We will have return to orthopedic clinic with nerve conduction studies with Dr. Dempsey or sooner as needed.

## 2023-04-03 NOTE — PATIENT INSTRUCTIONS
Safety guidelines and activity restrictions are discussed with the patient.  Verbalizes understanding.  We will send him for EMG nerve conduction studies bilateral upper extremities to rule out cervical radiculopathy versus peripheral nerve entrapment.  I have given him Band-Aids and instructed him on placed in the Band-Aids over the PIP joint of the index finger during HS.  Verbalizes understanding.  We will have return to orthopedic clinic with nerve conduction studies with Dr. Dempsey or sooner as needed.

## 2023-04-06 PROCEDURE — 88305 TISSUE EXAM BY PATHOLOGIST: CPT | Mod: TC,SUR | Performed by: DERMATOLOGY

## 2023-04-06 PROCEDURE — 88305 PATHOLOGY, DERMATOLOGY: ICD-10-PCS | Mod: 26,,, | Performed by: PATHOLOGY

## 2023-04-06 PROCEDURE — 88305 TISSUE EXAM BY PATHOLOGIST: CPT | Mod: 26,,, | Performed by: PATHOLOGY

## 2023-04-10 ENCOUNTER — LAB REQUISITION (OUTPATIENT)
Dept: LAB | Facility: HOSPITAL | Age: 78
End: 2023-04-10
Attending: DERMATOLOGY
Payer: MEDICARE

## 2023-04-10 DIAGNOSIS — R20.8 OTHER DISTURBANCES OF SKIN SENSATION: ICD-10-CM

## 2023-04-10 DIAGNOSIS — D49.2 NEOPLASM OF UNSPECIFIED BEHAVIOR OF BONE, SOFT TISSUE, AND SKIN: ICD-10-CM

## 2023-04-11 LAB
ESTROGEN SERPL-MCNC: NORMAL PG/ML
INSULIN SERPL-ACNC: NORMAL U[IU]/ML
LAB AP GROSS DESCRIPTION: NORMAL
LAB AP LABORATORY NOTES: NORMAL
LAB AP SPEC A DDX: NORMAL
LAB AP SPEC A MORPHOLOGY: NORMAL
LAB AP SPEC A PROCEDURE: NORMAL
T3RU NFR SERPL: NORMAL %

## 2023-04-19 ENCOUNTER — TELEPHONE (OUTPATIENT)
Dept: ORTHOPEDICS | Facility: CLINIC | Age: 78
End: 2023-04-19
Payer: MEDICARE

## 2023-04-19 NOTE — TELEPHONE ENCOUNTER
4/18/23 @ 1025 attempted to call pt no answer voice msg left  4/19/23 @ 0820 called and spoke with pt. DAP has reviewed EMG results and would like to see pt clinic to go over results pt agrees appt made for what works in pt schedule

## 2023-04-20 PROCEDURE — 88305 TISSUE EXAM BY PATHOLOGIST: CPT | Mod: 26,,, | Performed by: PATHOLOGY

## 2023-04-20 PROCEDURE — 88305 PATHOLOGY, DERMATOLOGY: ICD-10-PCS | Mod: 26,,, | Performed by: PATHOLOGY

## 2023-04-20 PROCEDURE — 88305 TISSUE EXAM BY PATHOLOGIST: CPT | Mod: TC,SUR | Performed by: DERMATOLOGY

## 2023-04-21 ENCOUNTER — LAB REQUISITION (OUTPATIENT)
Dept: LAB | Facility: HOSPITAL | Age: 78
End: 2023-04-21
Attending: DERMATOLOGY
Payer: MEDICARE

## 2023-04-21 DIAGNOSIS — D04.4 CARCINOMA IN SITU OF SKIN OF SCALP AND NECK: ICD-10-CM

## 2023-04-24 ENCOUNTER — OFFICE VISIT (OUTPATIENT)
Dept: ORTHOPEDICS | Facility: CLINIC | Age: 78
End: 2023-04-24
Payer: MEDICARE

## 2023-04-24 DIAGNOSIS — G56.03 BILATERAL CARPAL TUNNEL SYNDROME: Primary | ICD-10-CM

## 2023-04-24 LAB
ESTROGEN SERPL-MCNC: NORMAL PG/ML
INSULIN SERPL-ACNC: NORMAL U[IU]/ML
LAB AP GROSS DESCRIPTION: NORMAL
LAB AP LABORATORY NOTES: NORMAL
LAB AP SPEC A DDX: NORMAL
LAB AP SPEC A MORPHOLOGY: NORMAL
T3RU NFR SERPL: NORMAL %

## 2023-04-24 PROCEDURE — 99214 OFFICE O/P EST MOD 30 MIN: CPT | Mod: S$PBB,,, | Performed by: ORTHOPAEDIC SURGERY

## 2023-04-24 PROCEDURE — 99214 PR OFFICE/OUTPT VISIT, EST, LEVL IV, 30-39 MIN: ICD-10-PCS | Mod: S$PBB,,, | Performed by: ORTHOPAEDIC SURGERY

## 2023-04-24 PROCEDURE — 99213 OFFICE O/P EST LOW 20 MIN: CPT | Mod: PBBFAC | Performed by: ORTHOPAEDIC SURGERY

## 2023-04-24 NOTE — PROGRESS NOTES
CLINIC NOTE       Chief Complaint   Patient presents with    Results        Usama Lee is a 77 y.o. male seen today for evaluation of bilateral hand pain and finger numbness.  Symptoms began insidiously and been progressive over the past 3 months period of time.  He is had prior neck surgery.  He is a patient of Dr. Kyree Moody (cardiology) and takes Eliquis.  He was able to stop his Eliquis prior to his neck surgery.  He is known to me having undergone A1 pulley release for trigger finger in the past.  He is not had any carpal tunnel surgery.  He is complaining of persistent decreased sensation in the thumb and index finger of both hands.  He underwent EMG/nerve conduction studies both upper extremities performed by  (neurology) on 04/12/2023.  Studies show severe sensory motor bilateral carpal tunnel syndrome.  As well there is mild-to-moderate bilateral ulnar neuropathies across the wrist and elbows and in least moderate chronic C7, C8/T1 radiculopathy with ongoing denervation.    X-rays left hand AP lateral oblique views 04/03/2023 shows carpus to be normally aligned.  There is however moderately severe pantrapezial DJD.  No evidence of fracture, dislocation or pathologic bone.    X-rays right hand three views 04/03/2023 shows the carpus to be normally aligned.  There is moderate pantrapezial DJD.  No evidence of acute fracture, dislocation or pathologic bone.  Past Medical History:   Diagnosis Date    Diabetes     Gout     Heart disease     High cholesterol     HTN (hypertension)     Parkinson disease     Sleep apnea      No family history on file.  Current Outpatient Medications on File Prior to Visit   Medication Sig Dispense Refill    acetaminophen (TYLENOL) 650 MG TbSR Take 650 mg by mouth Daily. prn      allopurinoL (ZYLOPRIM) 300 MG tablet TAKE 1 TABLET(S) BY MOUTH DAILY      apixaban (ELIQUIS) 5 mg Tab 5 mg 2 (two) times daily.      aspirin (ECOTRIN) 81 MG EC tablet Take 1 tablet  by mouth once daily.      atorvastatin (LIPITOR) 40 MG tablet 1 tablet.      atorvastatin (LIPITOR) 80 MG tablet TAKE ONE-HALF TABLET BY MOUTH DAILY FOR CHOLESTEROL. STOP MEDICATION AND CALL PROVIDER FOR ANY UNEXPLAINED MUSCLE ACHES,PAIN OR WEAKNESS      benztropine (COGENTIN) 1 MG tablet Take 1 tablet by mouth nightly.      carbidopa-levodopa  mg (SINEMET)  mg per tablet Take 1 tablet by mouth 3 (three) times daily.      carvediloL (COREG) 25 MG tablet Take 25 mg by mouth.      carvediloL (COREG) 6.25 MG tablet Take 6.25 mg by mouth 2 (two) times daily.      cyanocobalamin 500 MCG tablet 2 tablets      cyclobenzaprine (FLEXERIL) 5 MG tablet Take 5 mg by mouth 3 (three) times daily as needed for Muscle spasms.      doxycycline (MONODOX) 100 MG capsule TAKE ONE CAPSULE BY MOUTH DAILY WITH FOOD      famotidine (PEPCID) 20 MG tablet 20 mg.      ferrous sulfate (FEOSOL) 325 mg (65 mg iron) Tab tablet Take by mouth.      fluorouraciL (EFUDEX) 5 % cream APPLY TWICE DAILY TO LEFT cheeck FOR SIX WEEKS, WASH off EVERY MORNING      fluticasone propionate (FLONASE) 50 mcg/actuation nasal spray INSTILL 1 SPRAY IN NOSTRIL(S) DAILY      furosemide (LASIX) 40 MG tablet Take 1 tablet (40 mg total) by mouth 2 (two) times daily. 60 tablet 11    gabapentin (NEURONTIN) 400 MG capsule Take 400 mg by mouth.      gabapentin (NEURONTIN) 400 MG capsule 1 capsule.      glipiZIDE (GLUCOTROL) 5 MG tablet 5 mg 2 (two) times daily with meals.      isosorbide mononitrate (IMDUR) 30 MG 24 hr tablet Take 1 tablet by mouth once daily.      memantine (NAMENDA) 10 MG Tab Take 1 tablet (10 mg total) by mouth once daily. 90 tablet 3    nicotine polacrilex 2 MG Lozg DISSOLVE 1 LOZENGE IN MOUTH EVERY 2 HOURS AS NEEDED FOR SMOKING CESSATION      nitroGLYCERIN (NITROSTAT) 0.4 MG SL tablet DISSOLVE ONE TABLET UNDER THE TONGUE EVERY 5 MINUTES AS NEEDED  Strength: 0.4 mg 100 tablet 3    pantoprazole (PROTONIX) 40 MG tablet 40 mg.      potassium  chloride (KLOR-CON) 10 MEQ TbSR Take 1 tablet (10 mEq total) by mouth once daily. 90 tablet 3    primidone (MYSOLINE) 50 MG Tab Take 1 tablet (50 mg total) by mouth 3 (three) times daily. 270 tablet 3    promethazine (PHENERGAN) 25 MG tablet Take 1 tablet (25 mg total) by mouth every 4 (four) hours. 45 tablet 0    sertraline (ZOLOFT) 25 MG tablet Take 25 mg by mouth.      sertraline (ZOLOFT) 50 MG tablet Take 1 tablet (50 mg total) by mouth once daily. 90 tablet 3    SOOLANTRA 1 % Crea by Each Nostril route once daily.      sulfamethoxazole-trimethoprim 800-160mg (BACTRIM DS) 800-160 mg Tab Take 1 tablet by mouth 2 (two) times daily.      tamsulosin (FLOMAX) 0.4 mg Cap Take 0.4 mg by mouth.      traZODone (DESYREL) 100 MG tablet 50 mg.      triamterene-hydrochlorothiazide 37.5-25 mg (DYAZIDE) 37.5-25 mg per capsule Take 1 capsule by mouth every morning.       No current facility-administered medications on file prior to visit.       ROS     There were no vitals filed for this visit.    Past Surgical History:   Procedure Laterality Date    APPENDECTOMY      CARDIAC SURGERY      CERVICAL LAMINECTOMY WITH SPINAL FUSION N/A 04/28/2022    Procedure: C3-C7 Laminectomy and Fusion;  Surgeon: Charbel Blake MD;  Location: [facility] OR;  Service: Neurosurgery;  Laterality: N/A;  Neuro monitoring    FLEXOR TENDON REPAIR Left 08/19/2021    Procedure: REPAIR, TENDON, FLEXOR;  Surgeon: Daniel Dempsey MD;  Location: [facility] OR;  Service: Orthopedics;  Laterality: Left;    KNEE SURGERY      TOTAL KNEE ARTHROPLASTY Right     TRIGGER FINGER RELEASE Left 08/19/2021    Procedure: RELEASE, TRIGGER FINGER,RING;  Surgeon: Daniel Dempsey MD;  Location: [facility] OR;  Service: Orthopedics;  Laterality: Left;        Review of patient's allergies indicates:   Allergen Reactions    Codeine     Lovastatin      Other reaction(s): Muscle pain        Ortho Exam :  Well-developed well-nourished  male no acute  distress.  Alert oriented cooperative.  Neck is supple without JVD.  Skin is warm dry no lesions seen.  Right upper extremity is good motion right shoulder elbow forearm and wrist.  Positive Tinel's test over the volar carpus region.  Positive Phalen's test median nerve distribution 20 seconds.  No thenar or intrinsic muscle wasting appreciated.  No clawing.  Decreased sensation light touch in the thumb and index finger.  Exam of the left upper extremity shows good motion left shoulder elbow forearm and wrist.  Negative Adson's and hyperabduction test.  She is good motion left shoulder elbow forearm wrist and hand.  Positive Tinel's test.  Positive Phalen's test.  Decreased sensation thumb and index finger with good cap refill to all fingertips and thumb.      Assessment and Plan  Patient Active Problem List    Diagnosis Date Noted    Bilateral carpal tunnel syndrome 04/03/2023    Anxiety state 11/09/2022    Benign prostatic hyperplasia 11/09/2022    Chronic atrial fibrillation 11/09/2022    Carbuncle and furuncle of upper arm and forearm 11/09/2022    Chronic diastolic heart failure 11/09/2022    Atherosclerosis of native coronary artery with unstable angina pectoris 11/09/2022    Diabetic neuropathy 11/09/2022    Gastroesophageal reflux disease 11/09/2022    Heart failure 11/09/2022    Iron deficiency anemia 11/09/2022    Low back pain 11/09/2022    Osteoarthritis 11/09/2022    Peripheral neuropathy 11/09/2022    Polyneuropathy due to type 2 diabetes mellitus 11/09/2022    Potassium deficiency 11/09/2022    Obstructive sleep apnea syndrome 11/09/2022    Fall 11/09/2022    Dysphagia 11/09/2022    On long term drug therapy 11/09/2022    Visual hallucinations 05/03/2022    Cervical myelopathy 04/28/2022    HTN (hypertension) 04/28/2022    DM (diabetes mellitus) 04/28/2022    HLD (hyperlipidemia) 04/28/2022    History of total knee arthroplasty, right 09/23/2021    Trigger finger of left hand 07/26/2021    Impression:   Severe bilateral carpal tunnel syndrome  Plan:  Patient is offered carpal tunnel surgical release.  He would like to proceed on the left side.  Procedure was discussed in details.  The potential benefits and risks of surgery outlined to include but not limited to bleeding, infection damage to blood vessels nerves, need for further surgery, other risks and complications including even death the patient wished to proceed.  He will discontinue his Eliquis 48 hours prior to surgery.                                  Radiology Interpretation        Patient Name: Usama Lee  Date: 4/24/2023  YOB: 1945  MRN# [id number]        ORDERING DIAGNOSIS:  No diagnosis found.            X-rays left hand AP lateral oblique views 04/03/2023 shows carpus to be normally aligned.  There is however moderately severe pantrapezial DJD.  No evidence of fracture, dislocation or pathologic bone.    X-rays right hand three views 04/03/2023 shows the carpus to be normally aligned.  There is moderate pantrapezial DJD.  No evidence of acute fracture, dislocation or pathologic bone.           MD Daniel Curiel M.D.

## 2023-05-04 DIAGNOSIS — G56.02 CARPAL TUNNEL SYNDROME ON LEFT: Primary | ICD-10-CM

## 2023-05-09 DIAGNOSIS — Z71.89 COMPLEX CARE COORDINATION: ICD-10-CM

## 2023-06-01 ENCOUNTER — HOSPITAL ENCOUNTER (OUTPATIENT)
Facility: HOSPITAL | Age: 78
Discharge: HOME | End: 2023-06-01
Attending: ORTHOPAEDIC SURGERY | Admitting: ORTHOPAEDIC SURGERY
Payer: MEDICARE

## 2023-06-01 VITALS
HEART RATE: 60 BPM | BODY MASS INDEX: 32.48 KG/M2 | OXYGEN SATURATION: 95 % | RESPIRATION RATE: 20 BRPM | DIASTOLIC BLOOD PRESSURE: 58 MMHG | HEIGHT: 71 IN | TEMPERATURE: 98 F | WEIGHT: 232 LBS | SYSTOLIC BLOOD PRESSURE: 120 MMHG

## 2023-06-01 DIAGNOSIS — G56.02 CARPAL TUNNEL SYNDROME OF LEFT WRIST: ICD-10-CM

## 2023-06-01 LAB — GLUCOSE SERPL-MCNC: 122 MG/DL (ref 70–105)

## 2023-06-01 PROCEDURE — 99499 UNLISTED E&M SERVICE: CPT | Mod: ,,, | Performed by: ORTHOPAEDIC SURGERY

## 2023-06-01 PROCEDURE — 99499 NO LOS: ICD-10-PCS | Mod: ,,, | Performed by: ORTHOPAEDIC SURGERY

## 2023-06-01 PROCEDURE — 82962 GLUCOSE BLOOD TEST: CPT

## 2023-06-01 RX ORDER — HYDROMORPHONE HYDROCHLORIDE 2 MG/ML
0.5 INJECTION, SOLUTION INTRAMUSCULAR; INTRAVENOUS; SUBCUTANEOUS EVERY 5 MIN PRN
Status: CANCELLED | OUTPATIENT
Start: 2023-06-01

## 2023-06-01 RX ORDER — ONDANSETRON 2 MG/ML
4 INJECTION INTRAMUSCULAR; INTRAVENOUS DAILY PRN
Status: CANCELLED | OUTPATIENT
Start: 2023-06-01

## 2023-06-01 RX ORDER — SODIUM CHLORIDE 9 MG/ML
INJECTION, SOLUTION INTRAVENOUS CONTINUOUS
Status: CANCELLED | OUTPATIENT
Start: 2023-06-01

## 2023-06-01 RX ORDER — DIPHENHYDRAMINE HYDROCHLORIDE 50 MG/ML
25 INJECTION INTRAMUSCULAR; INTRAVENOUS EVERY 6 HOURS PRN
Status: CANCELLED | OUTPATIENT
Start: 2023-06-01

## 2023-06-01 RX ORDER — MEPERIDINE HYDROCHLORIDE 25 MG/ML
25 INJECTION INTRAMUSCULAR; INTRAVENOUS; SUBCUTANEOUS ONCE AS NEEDED
Status: CANCELLED | OUTPATIENT
Start: 2023-06-01 | End: 2023-06-02

## 2023-06-01 NOTE — HPI
Chief complaint:  Left wrist pain/finger numbness   History:   Usama Lee is a 77 y.o. male seen for evaluation of bilateral hand pain and finger numbness.  Symptoms began insidiously and been progressive over the past 3 months period of time.  He is had prior neck surgery.  He is a patient of Dr. Kyree Moody (cardiology) and takes Eliquis.  He was able to stop his Eliquis prior to his neck surgery.  He is known to me having undergone A1 pulley release for trigger finger in the past.  He is not had any carpal tunnel surgery.  He is complaining of persistent decreased sensation in the thumb and index finger of both hands.  He underwent EMG/nerve conduction studies both upper extremities performed by  (neurology) on 04/12/2023.  Studies show severe sensory motor bilateral carpal tunnel syndrome.  As well there is mild-to-moderate bilateral ulnar neuropathies across the wrist and elbows and in least moderate chronic C7, C8/T1 radiculopathy with ongoing denervation.    X-rays left hand AP lateral oblique views 04/03/2023 shows carpus to be normally aligned.  There is however moderately severe pantrapezial DJD.  No evidence of fracture, dislocation or pathologic bone.     Impression:  Severe median nerve compression (carpal tunnel syndrome)-left wrist  Plan:  Left carpal tunnel surgical release, outpatient beer block anesthesia discussed.  The potential benefits and risks of surgery outlined to include but not limited to bleeding, infection, damage to blood vessels and nerves need for further surgery, other risks and complications including even death the patient wished to proceed.

## 2023-06-01 NOTE — H&P
[facility] - Orthopedic Periop Services  Orthopedics  H&P    Patient Name: Usama Lee  MRN: [id number]  Admission Date: 6/1/2023  Primary Care Provider: Clayton Padilla MD    Patient information was obtained from patient and ER records.     Subjective:     Principal Problem:Carpal tunnel syndrome of left wrist    Chief Complaint: No chief complaint on file.       HPI: Chief complaint:  Left wrist pain/finger numbness   History:   Usama Lee is a 77 y.o. male seen for evaluation of bilateral hand pain and finger numbness.  Symptoms began insidiously and been progressive over the past 3 months period of time.  He is had prior neck surgery.  He is a patient of Dr. Kyree Moody (cardiology) and takes Eliquis.  He was able to stop his Eliquis prior to his neck surgery.  He is known to me having undergone A1 pulley release for trigger finger in the past.  He is not had any carpal tunnel surgery.  He is complaining of persistent decreased sensation in the thumb and index finger of both hands.  He underwent EMG/nerve conduction studies both upper extremities performed by  (neurology) on 04/12/2023.  Studies show severe sensory motor bilateral carpal tunnel syndrome.  As well there is mild-to-moderate bilateral ulnar neuropathies across the wrist and elbows and in least moderate chronic C7, C8/T1 radiculopathy with ongoing denervation.    X-rays left hand AP lateral oblique views 04/03/2023 shows carpus to be normally aligned.  There is however moderately severe pantrapezial DJD.  No evidence of fracture, dislocation or pathologic bone.     Impression:  Severe median nerve compression (carpal tunnel syndrome)-left wrist  Plan:  Left carpal tunnel surgical release, outpatient beer block anesthesia discussed.  The potential benefits and risks of surgery outlined to include but not limited to bleeding, infection, damage to blood vessels and nerves need for further surgery, other risks and  complications including even death the patient wished to proceed.      Past Medical History:   Diagnosis Date    Anticoagulant long-term use     Arthritis     Cancer     skin    Diabetes     Encounter for blood transfusion     Gout     Heart disease     High cholesterol     HTN (hypertension)     Parkinson disease     Sleep apnea        Past Surgical History:   Procedure Laterality Date    APPENDECTOMY      CARDIAC SURGERY      CERVICAL LAMINECTOMY WITH SPINAL FUSION N/A 04/28/2022    Procedure: C3-C7 Laminectomy and Fusion;  Surgeon: Charbel Blake MD;  Location: [facility] OR;  Service: Neurosurgery;  Laterality: N/A;  Neuro monitoring    FLEXOR TENDON REPAIR Left 08/19/2021    Procedure: REPAIR, TENDON, FLEXOR;  Surgeon: Daniel Dempsey MD;  Location: [facility] OR;  Service: Orthopedics;  Laterality: Left;    KNEE SURGERY      NECK SURGERY      TOTAL KNEE ARTHROPLASTY Right     TRIGGER FINGER RELEASE Left 08/19/2021    Procedure: RELEASE, TRIGGER FINGER,RING;  Surgeon: Daniel Dempsey MD;  Location: [facility] OR;  Service: Orthopedics;  Laterality: Left;       Review of patient's allergies indicates:   Allergen Reactions    Codeine     Lortab [hydrocodone-acetaminophen] Hallucinations    Lovastatin      Other reaction(s): Muscle pain       No current facility-administered medications for this encounter.     Family History    None       Tobacco Use    Smoking status: Never    Smokeless tobacco: Former   Substance and Sexual Activity    Alcohol use: Not Currently    Drug use: Never    Sexual activity: Not Currently     Review of Systems   Constitutional: Negative.   Objective:     Vital Signs (Most Recent):  Temp: 98 °F (36.7 °C) (06/01/23 0631)  Pulse: 60 (06/01/23 0631)  Resp: 20 (06/01/23 0631)  BP: (!) 120/58 (06/01/23 0638)  SpO2: 95 % (06/01/23 0631) Vital Signs (24h Range):  Temp:  [98 °F (36.7 °C)] 98 °F (36.7 °C)  Pulse:  [60] 60  Resp:  [20] 20  SpO2:  [95  "%] 95 %  BP: (120)/(58) 120/58     Weight: 105.2 kg (232 lb)  Height: 5' 11" (180.3 cm)  Body mass index is 32.36 kg/m².    No intake or output data in the 24 hours ending 06/01/23 0949     General    Vitals reviewed.  Constitutional: He is oriented to person, place, and time. He appears well-developed and well-nourished.   HENT:   Head: Normocephalic and atraumatic.   Eyes: EOM are normal. Pupils are equal, round, and reactive to light.   Neck: Neck supple.   Cardiovascular:  Normal rate, regular rhythm and normal heart sounds.            Pulmonary/Chest: Effort normal and breath sounds normal.   Abdominal: Soft. Bowel sounds are normal.   Neurological: He is alert and oriented to person, place, and time.   Psychiatric: He has a normal mood and affect. His behavior is normal.             Right Hand/Wrist Exam     Tests   Phalens sign: positive  Carpal Tunnel Compression Test: positive      Other     Neuorologic Exam    Median Distribution: abnormal  Ulnar Distribution: normal  Radial Distribution: normal      Left Hand/Wrist Exam     Other     Sensory Exam  Median Distribution: normal  Ulnar Distribution: normal  Radial Distribution: normal          Vascular Exam       Capillary Refill  Right Hand: normal capillary refill         Significant Labs: All pertinent labs within the past 24 hours have been reviewed.    Significant Imaging: I have reviewed all pertinent imaging results/findings.    Assessment/Plan:     No notes have been filed under this hospital service.  Service: Orthopedic Surgery      Daniel Dempsey MD  Orthopedics  [facility] - Orthopedic Periop Services    "

## 2023-06-01 NOTE — SUBJECTIVE & OBJECTIVE
"Past Medical History:   Diagnosis Date    Anticoagulant long-term use     Arthritis     Cancer     skin    Diabetes     Encounter for blood transfusion     Gout     Heart disease     High cholesterol     HTN (hypertension)     Parkinson disease     Sleep apnea        Past Surgical History:   Procedure Laterality Date    APPENDECTOMY      CARDIAC SURGERY      CERVICAL LAMINECTOMY WITH SPINAL FUSION N/A 04/28/2022    Procedure: C3-C7 Laminectomy and Fusion;  Surgeon: Charbel Blake MD;  Location: [facility] OR;  Service: Neurosurgery;  Laterality: N/A;  Neuro monitoring    FLEXOR TENDON REPAIR Left 08/19/2021    Procedure: REPAIR, TENDON, FLEXOR;  Surgeon: Daniel Dempsey MD;  Location: [facility] OR;  Service: Orthopedics;  Laterality: Left;    KNEE SURGERY      NECK SURGERY      TOTAL KNEE ARTHROPLASTY Right     TRIGGER FINGER RELEASE Left 08/19/2021    Procedure: RELEASE, TRIGGER FINGER,RING;  Surgeon: Daniel Dempsey MD;  Location: [facility] OR;  Service: Orthopedics;  Laterality: Left;       Review of patient's allergies indicates:   Allergen Reactions    Codeine     Lortab [hydrocodone-acetaminophen] Hallucinations    Lovastatin      Other reaction(s): Muscle pain       No current facility-administered medications for this encounter.     Family History    None       Tobacco Use    Smoking status: Never    Smokeless tobacco: Former   Substance and Sexual Activity    Alcohol use: Not Currently    Drug use: Never    Sexual activity: Not Currently     Review of Systems   Constitutional: Negative.   Objective:     Vital Signs (Most Recent):  Temp: 98 °F (36.7 °C) (06/01/23 0631)  Pulse: 60 (06/01/23 0631)  Resp: 20 (06/01/23 0631)  BP: (!) 120/58 (06/01/23 0638)  SpO2: 95 % (06/01/23 0631) Vital Signs (24h Range):  Temp:  [98 °F (36.7 °C)] 98 °F (36.7 °C)  Pulse:  [60] 60  Resp:  [20] 20  SpO2:  [95 %] 95 %  BP: (120)/(58) 120/58     Weight: 105.2 kg (232 lb)  Height: 5' 11" (180.3 cm)  Body mass " index is 32.36 kg/m².    No intake or output data in the 24 hours ending 06/01/23 0949     General    Vitals reviewed.  Constitutional: He is oriented to person, place, and time. He appears well-developed and well-nourished.   HENT:   Head: Normocephalic and atraumatic.   Eyes: EOM are normal. Pupils are equal, round, and reactive to light.   Neck: Neck supple.   Cardiovascular:  Normal rate, regular rhythm and normal heart sounds.            Pulmonary/Chest: Effort normal and breath sounds normal.   Abdominal: Soft. Bowel sounds are normal.   Neurological: He is alert and oriented to person, place, and time.   Psychiatric: He has a normal mood and affect. His behavior is normal.             Right Hand/Wrist Exam     Tests   Phalens sign: positive  Carpal Tunnel Compression Test: positive      Other     Neuorologic Exam    Median Distribution: abnormal  Ulnar Distribution: normal  Radial Distribution: normal      Left Hand/Wrist Exam     Other     Sensory Exam  Median Distribution: normal  Ulnar Distribution: normal  Radial Distribution: normal          Vascular Exam       Capillary Refill  Right Hand: normal capillary refill         Significant Labs: All pertinent labs within the past 24 hours have been reviewed.    Significant Imaging: I have reviewed all pertinent imaging results/findings.

## 2023-06-01 NOTE — INTERVAL H&P NOTE
The patient has been examined and the H&P has been reviewed:    I concur with the findings and no changes have occurred since H&P was written.    Surgery risks, benefits and alternative options discussed and understood by patient/family.          Active Hospital Problems    Diagnosis  POA    *Carpal tunnel syndrome of left wrist [G56.02]  Yes      Resolved Hospital Problems   No resolved problems to display.

## 2023-06-16 ENCOUNTER — LAB REQUISITION (OUTPATIENT)
Dept: LAB | Facility: HOSPITAL | Age: 78
End: 2023-06-16
Attending: INTERNAL MEDICINE
Payer: MEDICARE

## 2023-06-16 DIAGNOSIS — E11.51 TYPE 2 DIABETES MELLITUS WITH DIABETIC PERIPHERAL ANGIOPATHY WITHOUT GANGRENE: ICD-10-CM

## 2023-06-16 LAB
EST. AVERAGE GLUCOSE BLD GHB EST-MCNC: 120 MG/DL
HBA1C MFR BLD HPLC: 6.2 % (ref 4.5–6.6)

## 2023-06-16 PROCEDURE — 83036 HEMOGLOBIN GLYCOSYLATED A1C: CPT

## 2023-06-27 RX ORDER — ALPRAZOLAM 0.5 MG/1
0.5 TABLET ORAL
COMMUNITY
Start: 2023-05-12

## 2023-06-27 RX ORDER — CLOPIDOGREL BISULFATE 75 MG/1
75 TABLET ORAL
Status: ON HOLD | COMMUNITY
End: 2023-08-01

## 2023-06-27 RX ORDER — EZETIMIBE 10 MG/1
10 TABLET ORAL
COMMUNITY
Start: 2023-05-15

## 2023-06-27 RX ORDER — MELOXICAM 15 MG/1
15 TABLET ORAL
COMMUNITY

## 2023-06-29 ENCOUNTER — OFFICE VISIT (OUTPATIENT)
Dept: NEUROLOGY | Facility: CLINIC | Age: 78
End: 2023-06-29
Payer: MEDICARE

## 2023-06-29 VITALS
BODY MASS INDEX: 32.48 KG/M2 | HEIGHT: 71 IN | HEART RATE: 76 BPM | SYSTOLIC BLOOD PRESSURE: 117 MMHG | OXYGEN SATURATION: 99 % | DIASTOLIC BLOOD PRESSURE: 71 MMHG | WEIGHT: 232 LBS

## 2023-06-29 DIAGNOSIS — G25.0 BENIGN ESSENTIAL TREMOR: Primary | ICD-10-CM

## 2023-06-29 PROCEDURE — 99215 OFFICE O/P EST HI 40 MIN: CPT | Mod: PBBFAC | Performed by: PSYCHIATRY & NEUROLOGY

## 2023-06-29 PROCEDURE — 99214 PR OFFICE/OUTPT VISIT, EST, LEVL IV, 30-39 MIN: ICD-10-PCS | Mod: S$PBB,,, | Performed by: PSYCHIATRY & NEUROLOGY

## 2023-06-29 PROCEDURE — 99214 OFFICE O/P EST MOD 30 MIN: CPT | Mod: S$PBB,,, | Performed by: PSYCHIATRY & NEUROLOGY

## 2023-06-29 NOTE — PATIENT INSTRUCTIONS
Careful w/ excess beer intake  Cont Primidone 50 mg 2-3 x daily   Healthy lifestyle habits   F/u 6 months

## 2023-06-29 NOTE — PROGRESS NOTES
Subjective:       Patient ID: Usama Lee is a 78 y.o. male     Chief Complaint:    Chief Complaint   Patient presents with    Follow-up        Allergies:  Codeine, Lortab [hydrocodone-acetaminophen], and Lovastatin    Current Medications:    Outpatient Encounter Medications as of 6/29/2023   Medication Sig Dispense Refill    acetaminophen (TYLENOL) 650 MG TbSR Take 650 mg by mouth Daily. prn      allopurinoL (ZYLOPRIM) 300 MG tablet TAKE 1 TABLET(S) BY MOUTH DAILY      ALPRAZolam (XANAX) 0.5 MG tablet Take 0.5 mg by mouth.      apixaban (ELIQUIS) 5 mg Tab 5 mg 2 (two) times daily.      aspirin (ECOTRIN) 81 MG EC tablet Take 1 tablet by mouth once daily.      atorvastatin (LIPITOR) 40 MG tablet 1 tablet.      atorvastatin (LIPITOR) 80 MG tablet TAKE ONE-HALF TABLET BY MOUTH DAILY FOR CHOLESTEROL. STOP MEDICATION AND CALL PROVIDER FOR ANY UNEXPLAINED MUSCLE ACHES,PAIN OR WEAKNESS      benztropine (COGENTIN) 1 MG tablet Take 1 tablet by mouth nightly.      carbidopa-levodopa  mg (SINEMET)  mg per tablet Take 1 tablet by mouth 3 (three) times daily.      carvediloL (COREG) 25 MG tablet Take 25 mg by mouth.      carvediloL (COREG) 6.25 MG tablet Take 6.25 mg by mouth 2 (two) times daily.      clopidogreL (PLAVIX) 75 mg tablet Take 75 mg by mouth.      cyanocobalamin 500 MCG tablet 2 tablets      cyclobenzaprine (FLEXERIL) 5 MG tablet Take 5 mg by mouth 3 (three) times daily as needed for Muscle spasms.      doxycycline (MONODOX) 100 MG capsule TAKE ONE CAPSULE BY MOUTH DAILY WITH FOOD      ezetimibe (ZETIA) 10 mg tablet Take 10 mg by mouth.      famotidine (PEPCID) 20 MG tablet 20 mg.      ferrous sulfate (FEOSOL) 325 mg (65 mg iron) Tab tablet Take by mouth.      fluorouraciL (EFUDEX) 5 % cream APPLY TWICE DAILY TO LEFT cheeck FOR SIX WEEKS, WASH off EVERY MORNING      fluticasone propionate (FLONASE) 50 mcg/actuation nasal spray INSTILL 1 SPRAY IN NOSTRIL(S) DAILY      furosemide (LASIX) 40 MG  tablet Take 1 tablet (40 mg total) by mouth 2 (two) times daily. 60 tablet 11    gabapentin (NEURONTIN) 400 MG capsule Take 400 mg by mouth.      gabapentin (NEURONTIN) 400 MG capsule 1 capsule.      glipiZIDE (GLUCOTROL) 5 MG tablet 5 mg 2 (two) times daily with meals.      isosorbide mononitrate (IMDUR) 30 MG 24 hr tablet Take 1 tablet by mouth once daily.      meloxicam (MOBIC) 15 MG tablet Take 15 mg by mouth.      memantine (NAMENDA) 10 MG Tab Take 1 tablet (10 mg total) by mouth once daily. 90 tablet 3    nicotine polacrilex 2 MG Lozg DISSOLVE 1 LOZENGE IN MOUTH EVERY 2 HOURS AS NEEDED FOR SMOKING CESSATION      nitroGLYCERIN (NITROSTAT) 0.4 MG SL tablet DISSOLVE ONE TABLET UNDER THE TONGUE EVERY 5 MINUTES AS NEEDED  Strength: 0.4 mg 100 tablet 3    pantoprazole (PROTONIX) 40 MG tablet 40 mg.      potassium chloride (KLOR-CON) 10 MEQ TbSR Take 1 tablet (10 mEq total) by mouth once daily. 90 tablet 3    primidone (MYSOLINE) 50 MG Tab Take 1 tablet (50 mg total) by mouth 3 (three) times daily. 270 tablet 3    promethazine (PHENERGAN) 25 MG tablet Take 1 tablet (25 mg total) by mouth every 4 (four) hours. 45 tablet 0    sertraline (ZOLOFT) 25 MG tablet Take 25 mg by mouth.      sertraline (ZOLOFT) 50 MG tablet Take 1 tablet (50 mg total) by mouth once daily. 90 tablet 3    SOOLANTRA 1 % Crea by Each Nostril route once daily.      sulfamethoxazole-trimethoprim 800-160mg (BACTRIM DS) 800-160 mg Tab Take 1 tablet by mouth 2 (two) times daily.      tamsulosin (FLOMAX) 0.4 mg Cap Take 0.4 mg by mouth.      traZODone (DESYREL) 100 MG tablet 50 mg.      triamterene-hydrochlorothiazide 37.5-25 mg (DYAZIDE) 37.5-25 mg per capsule Take 1 capsule by mouth every morning.       No facility-administered encounter medications on file as of 6/29/2023.       History of Present Illness  79 yo WM w/ essential tremors well controlled on current Primidone 50 mg bid but he stopped b/c when he drinks few beers it knock s him out?  He  "states Primidone really helps he tremors        Past Medical History:   Diagnosis Date    Anticoagulant long-term use     Arthritis     Cancer     skin    Diabetes     Encounter for blood transfusion     Gout     Heart disease     High cholesterol     HTN (hypertension)     Parkinson disease     Sleep apnea        Past Surgical History:   Procedure Laterality Date    APPENDECTOMY      CARDIAC SURGERY      CERVICAL LAMINECTOMY WITH SPINAL FUSION N/A 04/28/2022    Procedure: C3-C7 Laminectomy and Fusion;  Surgeon: Charbel Blake MD;  Location: [facility] OR;  Service: Neurosurgery;  Laterality: N/A;  Neuro monitoring    FLEXOR TENDON REPAIR Left 08/19/2021    Procedure: REPAIR, TENDON, FLEXOR;  Surgeon: Daniel Dempsey MD;  Location: [facility] OR;  Service: Orthopedics;  Laterality: Left;    KNEE SURGERY      NECK SURGERY      TOTAL KNEE ARTHROPLASTY Right     TRIGGER FINGER RELEASE Left 08/19/2021    Procedure: RELEASE, TRIGGER FINGER,RING;  Surgeon: Daniel Dempsey MD;  Location: [facility] OR;  Service: Orthopedics;  Laterality: Left;       Social History  Mr. Lee  reports that he has never smoked. He has quit using smokeless tobacco. He reports that he does not currently use alcohol. He reports that he does not use drugs.    Family History  Mr.'s Lee family history is not on file.    Review of Systems  Review of Systems   Neurological:  Positive for tremors.   All other systems reviewed and are negative.   Objective:   /71 (BP Location: Right arm, Patient Position: Sitting, BP Method: Large (Automatic))   Pulse 76   Ht 5' 11" (1.803 m)   Wt 105.2 kg (232 lb)   SpO2 99%   BMI 32.36 kg/m²    NEUROLOGICAL EXAMINATION:     MENTAL STATUS   Oriented to person, place, and time.   Level of consciousness: alert  Knowledge: consistent with education.     CRANIAL NERVES   Cranial nerves II through XII intact.     MOTOR EXAM     Strength   Strength 5/5 throughout.     GAIT AND COORDINATION "        Antalgic gait      Physical Exam  Vitals reviewed.   Constitutional:       Appearance: He is obese.   Neurological:      Mental Status: He is oriented to person, place, and time. Mental status is at baseline.      Cranial Nerves: Cranial nerves 2-12 are intact.      Motor: Motor strength is normal.        Assessment:     Benign essential tremor         Primary Diagnosis and ICD10  Benign essential tremor [G25.0]    Plan:     Patient Instructions   Careful w/ excess beer intake  Cont Primidone 50 mg 2-3 x daily   Healthy lifestyle habits   F/u 6 months    There are no discontinued medications.    Requested Prescriptions      No prescriptions requested or ordered in this encounter

## 2023-07-05 ENCOUNTER — DOCUMENT SCAN (OUTPATIENT)
Dept: HOME HEALTH SERVICES | Facility: HOSPITAL | Age: 78
End: 2023-07-05
Payer: MEDICARE

## 2023-07-19 ENCOUNTER — TELEPHONE (OUTPATIENT)
Dept: ORTHOPEDICS | Facility: CLINIC | Age: 78
End: 2023-07-19
Payer: MEDICARE

## 2023-07-19 DIAGNOSIS — G56.02 CARPAL TUNNEL SYNDROME ON LEFT: Primary | ICD-10-CM

## 2023-07-19 NOTE — TELEPHONE ENCOUNTER
----- Message from Felisa Bennett sent at 7/19/2023  2:44 PM CDT -----  Regarding: Surgery  Patient is ready to set up surgery for carpal tunnel . Call back [phone].

## 2023-08-01 ENCOUNTER — ANESTHESIA (OUTPATIENT)
Dept: SURGERY | Facility: HOSPITAL | Age: 78
End: 2023-08-01
Payer: MEDICARE

## 2023-08-01 ENCOUNTER — HOSPITAL ENCOUNTER (OUTPATIENT)
Facility: HOSPITAL | Age: 78
Discharge: HOME | End: 2023-08-01
Attending: ORTHOPAEDIC SURGERY | Admitting: ORTHOPAEDIC SURGERY
Payer: MEDICARE

## 2023-08-01 ENCOUNTER — ANESTHESIA EVENT (OUTPATIENT)
Dept: SURGERY | Facility: HOSPITAL | Age: 78
End: 2023-08-01
Payer: MEDICARE

## 2023-08-01 VITALS
HEART RATE: 62 BPM | RESPIRATION RATE: 16 BRPM | OXYGEN SATURATION: 96 % | DIASTOLIC BLOOD PRESSURE: 80 MMHG | TEMPERATURE: 98 F | SYSTOLIC BLOOD PRESSURE: 139 MMHG

## 2023-08-01 DIAGNOSIS — G56.02 CARPAL TUNNEL SYNDROME OF LEFT WRIST: Primary | ICD-10-CM

## 2023-08-01 LAB
GLUCOSE SERPL-MCNC: 106 MG/DL (ref 70–105)
GLUCOSE SERPL-MCNC: 142 MG/DL (ref 70–105)

## 2023-08-01 PROCEDURE — D9220A PRA ANESTHESIA: ICD-10-PCS | Mod: CRNA,,, | Performed by: NURSE ANESTHETIST, CERTIFIED REGISTERED

## 2023-08-01 PROCEDURE — 37000008 HC ANESTHESIA 1ST 15 MINUTES: Performed by: ORTHOPAEDIC SURGERY

## 2023-08-01 PROCEDURE — 71000015 HC POSTOP RECOV 1ST HR: Performed by: ORTHOPAEDIC SURGERY

## 2023-08-01 PROCEDURE — D9220A PRA ANESTHESIA: Mod: ANES,,, | Performed by: ANESTHESIOLOGY

## 2023-08-01 PROCEDURE — 64721 PR REVISE MEDIAN N/CARPAL TUNNEL SURG: ICD-10-PCS | Mod: LT,,, | Performed by: ORTHOPAEDIC SURGERY

## 2023-08-01 PROCEDURE — 36000706: Performed by: ORTHOPAEDIC SURGERY

## 2023-08-01 PROCEDURE — 37000009 HC ANESTHESIA EA ADD 15 MINS: Performed by: ORTHOPAEDIC SURGERY

## 2023-08-01 PROCEDURE — D9220A PRA ANESTHESIA: Mod: CRNA,,, | Performed by: NURSE ANESTHETIST, CERTIFIED REGISTERED

## 2023-08-01 PROCEDURE — 64721 CARPAL TUNNEL SURGERY: CPT | Mod: LT,,, | Performed by: ORTHOPAEDIC SURGERY

## 2023-08-01 PROCEDURE — 71000033 HC RECOVERY, INTIAL HOUR: Performed by: ORTHOPAEDIC SURGERY

## 2023-08-01 PROCEDURE — 63600175 PHARM REV CODE 636 W HCPCS: Performed by: ORTHOPAEDIC SURGERY

## 2023-08-01 PROCEDURE — 25000003 PHARM REV CODE 250: Performed by: NURSE ANESTHETIST, CERTIFIED REGISTERED

## 2023-08-01 PROCEDURE — 36000707: Performed by: ORTHOPAEDIC SURGERY

## 2023-08-01 PROCEDURE — 71000016 HC POSTOP RECOV ADDL HR: Performed by: ORTHOPAEDIC SURGERY

## 2023-08-01 PROCEDURE — 63600175 PHARM REV CODE 636 W HCPCS: Performed by: NURSE ANESTHETIST, CERTIFIED REGISTERED

## 2023-08-01 PROCEDURE — D9220A PRA ANESTHESIA: ICD-10-PCS | Mod: ANES,,, | Performed by: ANESTHESIOLOGY

## 2023-08-01 PROCEDURE — 82962 GLUCOSE BLOOD TEST: CPT | Mod: 91

## 2023-08-01 PROCEDURE — 25000003 PHARM REV CODE 250: Performed by: ORTHOPAEDIC SURGERY

## 2023-08-01 RX ORDER — ONDANSETRON 2 MG/ML
4 INJECTION INTRAMUSCULAR; INTRAVENOUS DAILY PRN
Status: DISCONTINUED | OUTPATIENT
Start: 2023-08-01 | End: 2023-08-01 | Stop reason: HOSPADM

## 2023-08-01 RX ORDER — HYDROCODONE BITARTRATE AND ACETAMINOPHEN 10; 325 MG/1; MG/1
1 TABLET ORAL EVERY 4 HOURS PRN
Status: DISCONTINUED | OUTPATIENT
Start: 2023-08-01 | End: 2023-08-01 | Stop reason: HOSPADM

## 2023-08-01 RX ORDER — PROPOFOL 10 MG/ML
VIAL (ML) INTRAVENOUS CONTINUOUS PRN
Status: DISCONTINUED | OUTPATIENT
Start: 2023-08-01 | End: 2023-08-01

## 2023-08-01 RX ORDER — MEPERIDINE HYDROCHLORIDE 25 MG/ML
25 INJECTION INTRAMUSCULAR; INTRAVENOUS; SUBCUTANEOUS ONCE AS NEEDED
Status: DISCONTINUED | OUTPATIENT
Start: 2023-08-01 | End: 2023-08-01 | Stop reason: HOSPADM

## 2023-08-01 RX ORDER — ONDANSETRON 4 MG/1
8 TABLET, ORALLY DISINTEGRATING ORAL EVERY 8 HOURS PRN
Status: DISCONTINUED | OUTPATIENT
Start: 2023-08-01 | End: 2023-08-01 | Stop reason: HOSPADM

## 2023-08-01 RX ORDER — HYDROCODONE BITARTRATE AND ACETAMINOPHEN 5; 325 MG/1; MG/1
1 TABLET ORAL EVERY 4 HOURS PRN
Status: DISCONTINUED | OUTPATIENT
Start: 2023-08-01 | End: 2023-08-01 | Stop reason: HOSPADM

## 2023-08-01 RX ORDER — PROMETHAZINE HYDROCHLORIDE 25 MG/1
25 TABLET ORAL EVERY 6 HOURS PRN
Status: DISCONTINUED | OUTPATIENT
Start: 2023-08-01 | End: 2023-08-01 | Stop reason: HOSPADM

## 2023-08-01 RX ORDER — DIPHENHYDRAMINE HYDROCHLORIDE 50 MG/ML
25 INJECTION INTRAMUSCULAR; INTRAVENOUS EVERY 6 HOURS PRN
Status: DISCONTINUED | OUTPATIENT
Start: 2023-08-01 | End: 2023-08-01 | Stop reason: HOSPADM

## 2023-08-01 RX ORDER — ACETAMINOPHEN 500 MG
1000 TABLET ORAL EVERY 6 HOURS PRN
Status: DISCONTINUED | OUTPATIENT
Start: 2023-08-01 | End: 2023-08-01 | Stop reason: HOSPADM

## 2023-08-01 RX ORDER — MIDAZOLAM HYDROCHLORIDE 1 MG/ML
INJECTION INTRAMUSCULAR; INTRAVENOUS
Status: DISCONTINUED | OUTPATIENT
Start: 2023-08-01 | End: 2023-08-01

## 2023-08-01 RX ORDER — SODIUM CHLORIDE 9 MG/ML
INJECTION, SOLUTION INTRAVENOUS CONTINUOUS
Status: DISCONTINUED | OUTPATIENT
Start: 2023-08-01 | End: 2023-08-01 | Stop reason: HOSPADM

## 2023-08-01 RX ORDER — HYDROMORPHONE HYDROCHLORIDE 2 MG/ML
0.5 INJECTION, SOLUTION INTRAMUSCULAR; INTRAVENOUS; SUBCUTANEOUS EVERY 5 MIN PRN
Status: DISCONTINUED | OUTPATIENT
Start: 2023-08-01 | End: 2023-08-01 | Stop reason: HOSPADM

## 2023-08-01 RX ORDER — BUPIVACAINE HYDROCHLORIDE 2.5 MG/ML
INJECTION, SOLUTION EPIDURAL; INFILTRATION; INTRACAUDAL
Status: DISCONTINUED | OUTPATIENT
Start: 2023-08-01 | End: 2023-08-01 | Stop reason: HOSPADM

## 2023-08-01 RX ORDER — FENTANYL CITRATE 50 UG/ML
INJECTION, SOLUTION INTRAMUSCULAR; INTRAVENOUS
Status: DISCONTINUED | OUTPATIENT
Start: 2023-08-01 | End: 2023-08-01

## 2023-08-01 RX ADMIN — SODIUM CHLORIDE: 9 INJECTION, SOLUTION INTRAVENOUS at 02:08

## 2023-08-01 RX ADMIN — FENTANYL CITRATE 50 MCG: 50 INJECTION INTRAMUSCULAR; INTRAVENOUS at 02:08

## 2023-08-01 RX ADMIN — PROPOFOL 25 MCG/KG/MIN: 10 INJECTION, EMULSION INTRAVENOUS at 02:08

## 2023-08-01 RX ADMIN — MIDAZOLAM 2 MG: 1 INJECTION INTRAMUSCULAR; INTRAVENOUS at 02:08

## 2023-08-01 RX ADMIN — SODIUM CHLORIDE: 9 INJECTION, SOLUTION INTRAVENOUS at 11:08

## 2023-08-01 NOTE — SUBJECTIVE & OBJECTIVE
Past Medical History:   Diagnosis Date    Anticoagulant long-term use     Arthritis     Cancer     skin    Diabetes     Encounter for blood transfusion     Gout     Heart disease     High cholesterol     HTN (hypertension)     Parkinson disease     Sleep apnea        Past Surgical History:   Procedure Laterality Date    APPENDECTOMY      CARDIAC SURGERY      CERVICAL LAMINECTOMY WITH SPINAL FUSION N/A 04/28/2022    Procedure: C3-C7 Laminectomy and Fusion;  Surgeon: Charbel Blake MD;  Location: [facility] OR;  Service: Neurosurgery;  Laterality: N/A;  Neuro monitoring    FLEXOR TENDON REPAIR Left 08/19/2021    Procedure: REPAIR, TENDON, FLEXOR;  Surgeon: Daniel Dempsey MD;  Location: [facility] OR;  Service: Orthopedics;  Laterality: Left;    KNEE SURGERY      NECK SURGERY      TOTAL KNEE ARTHROPLASTY Right     TRIGGER FINGER RELEASE Left 08/19/2021    Procedure: RELEASE, TRIGGER FINGER,RING;  Surgeon: Daniel Dempsey MD;  Location: [facility] OR;  Service: Orthopedics;  Laterality: Left;       Review of patient's allergies indicates:   Allergen Reactions    Codeine     Lortab [hydrocodone-acetaminophen] Hallucinations    Lovastatin      Other reaction(s): Muscle pain       No current facility-administered medications for this encounter.     Family History    None       Tobacco Use    Smoking status: Never    Smokeless tobacco: Former   Substance and Sexual Activity    Alcohol use: Not Currently    Drug use: Never    Sexual activity: Not Currently     Review of Systems   Constitutional: Negative.     Objective:     Vital Signs (Most Recent):    Vital Signs (24h Range):              There is no height or weight on file to calculate BMI.    No intake or output data in the 24 hours ending 08/01/23 1107     General    Vitals reviewed.  Constitutional: He is oriented to person, place, and time. He appears well-developed and well-nourished.   HENT:   Head: Normocephalic and atraumatic.   Eyes: EOM are  normal. Pupils are equal, round, and reactive to light.   Neck: Neck supple.   Cardiovascular:  Normal rate, regular rhythm and normal heart sounds.            Pulmonary/Chest: Effort normal and breath sounds normal.   Abdominal: Soft. Bowel sounds are normal.   Neurological: He is alert and oriented to person, place, and time.   Psychiatric: He has a normal mood and affect. His behavior is normal.             Right Hand/Wrist Exam   Right hand exam is normal.      Left Hand/Wrist Exam     Tests   Phalens sign: positive  Tinel's sign (median nerve): positive  Carpal Tunnel Compression Test: positive      Other     Sensory Exam  Median Distribution: abnormal  Ulnar Distribution: normal  Radial Distribution: normal          Vascular Exam       Capillary Refill  Left Hand: normal capillary refill           Significant Labs: All pertinent labs within the past 24 hours have been reviewed.    Significant Imaging: I have reviewed all pertinent imaging results/findings.

## 2023-08-01 NOTE — H&P
[facility] - Orthopedic Periop Services  Orthopedics  H&P    Patient Name: Usama Lee  MRN: [id number]  Admission Date: 8/1/2023  Primary Care Provider: Clayotn Padilla MD    Patient information was obtained from patient and ER records.     Subjective:     Principal Problem:Carpal tunnel syndrome of left wrist    Chief Complaint: No chief complaint on file.       HPI: Chief complaint:  Pain/numbness-left wrist   History:      Usama Lee is a 77 y.o. male seen for evaluation of bilateral hand pain and finger numbness.  Symptoms began insidiously and been progressive over the past 6 months period of time.  He is had prior neck surgery.  He is a patient of Dr. Kyree Moody (cardiology) and takes Eliquis.  He was able to stop his Eliquis prior to his neck surgery.  He is known to me having undergone A1 pulley release for trigger finger in the past.  He is not had any carpal tunnel surgery.  He is complaining of persistent decreased sensation in the thumb and index finger of both hands.  He underwent EMG/nerve conduction studies both upper extremities performed by  (neurology) on 04/12/2023.  Studies show severe sensory motor bilateral carpal tunnel syndrome.  As well there is mild-to-moderate bilateral ulnar neuropathies across the wrist and elbows and in least moderate chronic C7, C8/T1 radiculopathy with ongoing denervation.    X-rays left hand AP lateral oblique views 04/03/2023 shows carpus to be normally aligned.  There is however moderately severe pantrapezial DJD.  No evidence of fracture, dislocation or pathologic bone.     Impression: Severe Left Carpal tunnel syndrome  Plan Left Carpal tunnel release      Past Medical History:   Diagnosis Date    Anticoagulant long-term use     Arthritis     Cancer     skin    Diabetes     Encounter for blood transfusion     Gout     Heart disease     High cholesterol     HTN (hypertension)     Parkinson disease     Sleep apnea         Past Surgical History:   Procedure Laterality Date    APPENDECTOMY      CARDIAC SURGERY      CERVICAL LAMINECTOMY WITH SPINAL FUSION N/A 04/28/2022    Procedure: C3-C7 Laminectomy and Fusion;  Surgeon: Charbel Blake MD;  Location: [facility] OR;  Service: Neurosurgery;  Laterality: N/A;  Neuro monitoring    FLEXOR TENDON REPAIR Left 08/19/2021    Procedure: REPAIR, TENDON, FLEXOR;  Surgeon: Daniel Dempsey MD;  Location: [facility] OR;  Service: Orthopedics;  Laterality: Left;    KNEE SURGERY      NECK SURGERY      TOTAL KNEE ARTHROPLASTY Right     TRIGGER FINGER RELEASE Left 08/19/2021    Procedure: RELEASE, TRIGGER FINGER,RING;  Surgeon: Daniel Dempsey MD;  Location: [facility] OR;  Service: Orthopedics;  Laterality: Left;       Review of patient's allergies indicates:   Allergen Reactions    Codeine     Lortab [hydrocodone-acetaminophen] Hallucinations    Lovastatin      Other reaction(s): Muscle pain       No current facility-administered medications for this encounter.     Family History    None       Tobacco Use    Smoking status: Never    Smokeless tobacco: Former   Substance and Sexual Activity    Alcohol use: Not Currently    Drug use: Never    Sexual activity: Not Currently     Review of Systems   Constitutional: Negative.     Objective:     Vital Signs (Most Recent):    Vital Signs (24h Range):              There is no height or weight on file to calculate BMI.    No intake or output data in the 24 hours ending 08/01/23 1107     General    Vitals reviewed.  Constitutional: He is oriented to person, place, and time. He appears well-developed and well-nourished.   HENT:   Head: Normocephalic and atraumatic.   Eyes: EOM are normal. Pupils are equal, round, and reactive to light.   Neck: Neck supple.   Cardiovascular:  Normal rate, regular rhythm and normal heart sounds.            Pulmonary/Chest: Effort normal and breath sounds normal.   Abdominal: Soft. Bowel  sounds are normal.   Neurological: He is alert and oriented to person, place, and time.   Psychiatric: He has a normal mood and affect. His behavior is normal.             Right Hand/Wrist Exam   Right hand exam is normal.      Left Hand/Wrist Exam     Tests   Phalens sign: positive  Tinel's sign (median nerve): positive  Carpal Tunnel Compression Test: positive      Other     Sensory Exam  Median Distribution: abnormal  Ulnar Distribution: normal  Radial Distribution: normal          Vascular Exam       Capillary Refill  Left Hand: normal capillary refill           Significant Labs: All pertinent labs within the past 24 hours have been reviewed.    Significant Imaging: I have reviewed all pertinent imaging results/findings.    Assessment/Plan:     No notes have been filed under this hospital service.  Service: Orthopedic Surgery      Daniel Dempsey MD  Orthopedics  [facility] - Orthopedic Periop Services

## 2023-08-01 NOTE — OP NOTE
[facility] - Orthopedic Periop Services  Surgery Department  Operative Note    SUMMARY     Date of Procedure: 8/1/2023     Procedure: Procedure(s) (LRB):  RELEASE, CARPAL TUNNEL (Left)     Surgeon(s) and Role:     * Daniel Dempsey MD - Primary    Assisting Surgeon: None    Pre-Operative Diagnosis: Carpal tunnel syndrome on left [G56.02]    Post-Operative Diagnosis: Post-Op Diagnosis Codes:     * Carpal tunnel syndrome on left [G56.02]    Anesthesia: [place] block    Technical Procedures Used:              DEPARTMENT OF ORTHOPEDIC SURGERY                OPERATIVE REPORT     NAME:  Usama Lee  MRN: [id number]               DATE OF SURGERY:  08/01/2023       PREOPERATIVE DIAGNOSIS:  left carpal tunnel syndrome    POSTOPERATIVE DIAGNOSIS:  left carpal tunnel syndrome    ANESTHESIA:  [place] block    PROCEDURE:  leftcarpal tunnel surgical release    PROCEDURE IN DETAIL:  The patient was taken to the operating room and placed in the supine position.  After an adequate level of regional chichi block anesthesia had been achieved (SeeAnesthesia Note). The patients left upper extremity was scrubbed with Betadine and prepped in sterile fashion.  The operation was begun by making a linear skin incision from mid palm to the distal volar wrist crease.  The incision was carried carefully through the subcutaneous layers.  Now the transverse palmar fascia and volar carpal ligament were incised in line with the skin incision. A freer elevator was placed between the overlying fascia and underlying carpal canal contents.  Inspection revealed flexor tenosynovitis.  Inspection of the median nerve revealed constrictions beneath the volar carpal ligament.  A careful external neurolysis was performed using Loupe magnification.  A limited tenosynovectomy was performed.  Skin margins were infiltrated with ½% Marcaine without Epinephrine.   The tourniquet was let down and hemostasis was achieved with the bipolar electrocautery.  The  wound was irrigated copiously with antibiotic solution and the skin margins were reapproximated with running 5-0 Nylon suture.  The wound was dressed sterilely.  The patient was taken to the recovery room in satisfactory condition.  Estimated blood loss:  Minimal.  The patient received one gram of Ancef antibiotic intravenously prior to the procedure. Tourniquet time: 40min    Daniel Dempsey           Significant Surgical Tasks Conducted by the Assistant(s), if Applicable:     Complications: No    Estimated Blood Loss (EBL): 5 mL           Implants: * No implants in log *    Specimens:   Specimen (24h ago, onward)      None                    Condition: Good    Disposition: PACU - hemodynamically stable.    Attestation: I was present and scrubbed for the entire procedure.

## 2023-08-01 NOTE — PROGRESS NOTES
1555 RELEASED TO [facility] RN AWAKE, ALERT. NO DISTRESS NOTED. V/S 130/68-67-16-98%. FAMILY AT BEDSIDE.

## 2023-08-01 NOTE — ANESTHESIA PREPROCEDURE EVALUATION
08/01/2023  Usama Lee is a 78 y.o., male.      Pre-op Assessment    I have reviewed the Patient Summary Reports.    I have reviewed the NPO Status.   I have reviewed the Medications.     Review of Systems  Anesthesia Hx:  No problems with previous Anesthesia  Denies Family Hx of Anesthesia complications.   Denies Personal Hx of Anesthesia complications.   Social:  Non-Smoker, No Alcohol Use    Hematology/Oncology:  Hematology Normal   Oncology Normal     EENT/Dental:EENT/Dental Normal   Cardiovascular:   Exercise tolerance: good Pacemaker Hypertension CAD  CABG/stent Dysrhythmias atrial fibrillation Angina CHF hyperlipidemia ECG has been reviewed.    Pulmonary:   Sleep Apnea, CPAP    Renal/:   BPH    Hepatic/GI:   GERD    Musculoskeletal:   Arthritis     Neurological:   Neuromuscular Disease,   Peripheral Neuropathy  Dementia mild  Movement Disorder Dx, Parkinson's Disease   Endocrine:   Diabetes  Obesity / BMI > 30  Dermatological:  Skin Normal    Psych:   anxiety          Physical Exam  General: Well nourished, Cooperative, Alert and Oriented    Airway:  Mallampati: II / II  Mouth Opening: Normal  TM Distance: Normal  Neck ROM: Normal ROM    Chest/Lungs:  Clear to auscultation    Heart:  Rate: Normal  Rhythm: Regular Rhythm  Sounds: Normal        Chemistry        Component Value Date/Time     11/09/2022 1554    K 3.7 11/09/2022 1554     11/09/2022 1554    CO2 33 (H) 11/09/2022 1554    BUN 16 11/09/2022 1554    CREATININE 0.83 11/09/2022 1554     (H) 11/09/2022 1554        Component Value Date/Time    CALCIUM 8.8 11/09/2022 1554    ALKPHOS 160 (H) 11/09/2022 1554    AST 18 11/09/2022 1554    ALT 20 11/09/2022 1554    BILITOT 0.4 11/09/2022 1554    EGFRNONAA 93 05/03/2022 0353        Lab Results   Component Value Date    WBC 6.26 11/09/2022    RBC 4.23 (L) 11/09/2022    HGB 12.7  (L) 11/09/2022    MCV 92.7 11/09/2022    MCH 30.0 11/09/2022    MCHC 32.4 11/09/2022    RDW 15.9 (H) 11/09/2022     11/09/2022    MPV 9.7 11/09/2022    LYMPH 29.1 11/09/2022    LYMPH 1.82 11/09/2022    MONO 8.5 (H) 11/09/2022    EOS 0.08 11/09/2022    BASO 0.02 11/09/2022     Results for orders placed or performed in visit on 04/13/22   EKG 12-lead    Collection Time: 04/13/22 10:04 AM    Narrative    Test Reason : Z01.818,    Vent. Rate : 081 BPM     Atrial Rate : 081 BPM     P-R Int : 342 ms          QRS Dur : 084 ms      QT Int : 372 ms       P-R-T Axes : 092 -36 -32 degrees     QTc Int : 432 ms    Atrial-paced rhythm with prolonged AV conduction  Left axis deviation  Low voltage QRS  Nonspecific ST and T wave abnormality  Abnormal ECG  When compared with ECG of 19-AUG-2021 11:27,  No significant change was found  Confirmed by Lisandra Elmore MD [id number]) on 4/14/2022 11:13:07 AM    Referred By: LUDIN LACKEY           Confirmed By:Lisandra Elmore MD         Anesthesia Plan  Type of Anesthesia, risks & benefits discussed:    Anesthesia Type: MAC, Regional  Intra-op Monitoring Plan: Standard ASA Monitors  Post Op Pain Control Plan: multimodal analgesia  Induction:  IV  Airway Plan: Direct  Informed Consent: Informed consent signed with the Patient and all parties understand the risks and agree with anesthesia plan.  All questions answered. Patient consented to blood products? Yes  ASA Score: 3  Day of Surgery Review of History & Physical: H&P Update referred to the surgeon/provider.I have interviewed and examined the patient. I have reviewed the patient's H&P dated: There are no significant changes.   Anesthesia Plan Notes: ASA 3, RANJITH block/MAC, NPO status light snack at 05:30    Ready For Surgery From Anesthesia Perspective.     .

## 2023-08-01 NOTE — PROGRESS NOTES
1523 RECEIVED TO RR AWAKE, ALERT. COLOR PINK. NO RESP.DISTRESS NOTED. HOB ELEVATED. DRESSING LEFT ARM WITH DRESSING D/I. ARM SLING LEFT ARM. LEFT HAND WARM, PINK, MOBILE. NO C/O PAIN. IV INFUSING WELL RIGHT HAND 20G. CATH. SCD AND RYAN HOSE IN PROGRESS. BLOOD SUGAR 106. NO DISTRESS NOTED. SEE FLOW SHEET.    1553 AWAKE,ALERT. NO C/O PAIN OR DISCOMFORT. TRANSFERRED TO ROOM WITHOUT DISTRESS NOTED.

## 2023-08-01 NOTE — ANESTHESIA POSTPROCEDURE EVALUATION
Anesthesia Post Evaluation    Patient: Usama Lee    Procedure(s) Performed: Procedure(s) (LRB):  RELEASE, CARPAL TUNNEL (Left)    Final Anesthesia Type: general      Patient location during evaluation: PACU  Patient participation: Yes- Able to Participate  Level of consciousness: awake and sedated  Post-procedure vital signs: reviewed and stable  Pain management: adequate  Airway patency: patent    PONV status at discharge: No PONV  Anesthetic complications: no      Cardiovascular status: blood pressure returned to baseline  Respiratory status: unassisted  Hydration status: euvolemic  Follow-up not needed.          Vitals Value Taken Time   /82 08/01/23 1550   Temp 36.4 °C (97.5 °F) 08/01/23 1525   Pulse 61 08/01/23 1552   Resp 16 08/01/23 1553   SpO2 98 % 08/01/23 1552   Vitals shown include unvalidated device data.      Event Time   Out of Recovery 15:53:00         Pain/Magdy Score: Magdy Score: 10 (8/1/2023  3:45 PM)

## 2023-08-01 NOTE — DISCHARGE SUMMARY
[facility] - Orthopedic Periop Services  Discharge Note  Short Stay    Procedure(s) (LRB):  RELEASE, CARPAL TUNNEL (Left)      OUTCOME: Patient tolerated treatment/procedure well without complication and is now ready for discharge.    DISPOSITION: Home or Self Care    FINAL DIAGNOSIS:  Carpal tunnel syndrome of left wrist    FOLLOWUP: In clinic    DISCHARGE INSTRUCTIONS:    Discharge Procedure Orders   Diet general     Keep surgical extremity elevated     Ice to affected area   Order Comments: using barrier between ice and skin (specify duration&frequency)     Remove dressing in 72 hours   Order Comments: Keep dressing in place for 72 hours     Change dressing (specify)   Order Comments: Dressing change: one time per day beginning 72 hours post op.     Call MD for:  temperature >100.4     Call MD for:  persistent nausea and vomiting     Call MD for:  severe uncontrolled pain     Call MD for:  difficulty breathing, headache or visual disturbances     Call MD for:  redness, tenderness, or signs of infection (pain, swelling, redness, odor or green/yellow discharge around incision site)     Call MD for:  hives     Call MD for:  persistent dizziness or light-headedness     Call MD for:  extreme fatigue     Activity as tolerated     Weight bearing as tolerated         Clinical Reference Documents Added to Patient Instructions         Document    CARPAL TUNNEL RELEASE (ENGLISH)    [facility] OPIOID AVS FACTSHEET            TIME SPENT ON DISCHARGE: 15 minutes

## 2023-08-01 NOTE — BRIEF OP NOTE
[facility] - Orthopedic Periop Services  Brief Operative Note    Surgery Date: 8/1/2023     Surgeon(s) and Role:     * Daniel Dempsey MD - Primary    Assisting Surgeon: None    Pre-op Diagnosis:  Carpal tunnel syndrome on left [G56.02]    Post-op Diagnosis:  Post-Op Diagnosis Codes:     * Carpal tunnel syndrome on left [G56.02]    Procedure(s) (LRB):  RELEASE, CARPAL TUNNEL (Left)    Anesthesia: Choice    Description of the findings of the procedure(s): See Op Note     Estimated Blood Loss: 5 mL         Specimens:   Specimen (24h ago, onward)      None              Discharge Note    OUTCOME: Patient tolerated treatment/procedure well without complication and is now ready for discharge.    DISPOSITION: Home or Self Care    FINAL DIAGNOSIS:  Carpal tunnel syndrome of left wrist    FOLLOWUP: In clinic    DISCHARGE INSTRUCTIONS:    Discharge Procedure Orders   Diet general     Keep surgical extremity elevated     Ice to affected area   Order Comments: using barrier between ice and skin (specify duration&frequency)     Remove dressing in 72 hours   Order Comments: Keep dressing in place for 72 hours     Change dressing (specify)   Order Comments: Dressing change: one time per day beginning 72 hours post op.     Call MD for:  temperature >100.4     Call MD for:  persistent nausea and vomiting     Call MD for:  severe uncontrolled pain     Call MD for:  difficulty breathing, headache or visual disturbances     Call MD for:  redness, tenderness, or signs of infection (pain, swelling, redness, odor or green/yellow discharge around incision site)     Call MD for:  hives     Call MD for:  persistent dizziness or light-headedness     Call MD for:  extreme fatigue     Activity as tolerated     Weight bearing as tolerated        Clinical Reference Documents Added to Patient Instructions         Document    CARPAL TUNNEL RELEASE (ENGLISH)    [facility] OPIOID AVS FACTSHEET

## 2023-08-01 NOTE — TRANSFER OF CARE
Anesthesia Transfer of Care Note    Patient: Usama Lee    Procedure(s) Performed: Procedure(s) (LRB):  RELEASE, CARPAL TUNNEL (Left)    Patient location: PACU    Anesthesia Type: general    Transport from OR: Transported from OR on room air with adequate spontaneous ventilation    Post pain: adequate analgesia    Post assessment: no apparent anesthetic complications    Post vital signs: stable    Level of consciousness: responds to stimulation and awake    Nausea/Vomiting: no nausea/vomiting    Complications: none    Transfer of care protocol was followedComments: Good SV continue, NAD noted, VSS, RTRN      Last vitals:   Visit Vitals  /81   Pulse 62   Temp 36.4 °C (97.5 °F)   Resp 14   SpO2 96%

## 2023-08-01 NOTE — HPI
Chief complaint:  Pain/numbness-left wrist   History:      Usama Lee is a 77 y.o. male seen for evaluation of bilateral hand pain and finger numbness.  Symptoms began insidiously and been progressive over the past 6 months period of time.  He is had prior neck surgery.  He is a patient of Dr. Kyree Moody (cardiology) and takes Eliquis.  He was able to stop his Eliquis prior to his neck surgery.  He is known to me having undergone A1 pulley release for trigger finger in the past.  He is not had any carpal tunnel surgery.  He is complaining of persistent decreased sensation in the thumb and index finger of both hands.  He underwent EMG/nerve conduction studies both upper extremities performed by  (neurology) on 04/12/2023.  Studies show severe sensory motor bilateral carpal tunnel syndrome.  As well there is mild-to-moderate bilateral ulnar neuropathies across the wrist and elbows and in least moderate chronic C7, C8/T1 radiculopathy with ongoing denervation.    X-rays left hand AP lateral oblique views 04/03/2023 shows carpus to be normally aligned.  There is however moderately severe pantrapezial DJD.  No evidence of fracture, dislocation or pathologic bone.     Impression: Severe Left Carpal tunnel syndrome  Plan Left Carpal tunnel release

## 2023-08-11 ENCOUNTER — OFFICE VISIT (OUTPATIENT)
Dept: ORTHOPEDICS | Facility: CLINIC | Age: 78
End: 2023-08-11
Payer: MEDICARE

## 2023-08-11 VITALS
HEIGHT: 71 IN | OXYGEN SATURATION: 96 % | BODY MASS INDEX: 32.48 KG/M2 | TEMPERATURE: 98 F | RESPIRATION RATE: 20 BRPM | SYSTOLIC BLOOD PRESSURE: 112 MMHG | WEIGHT: 232 LBS | DIASTOLIC BLOOD PRESSURE: 76 MMHG | HEART RATE: 83 BPM

## 2023-08-11 DIAGNOSIS — Z98.890 S/P CARPAL TUNNEL RELEASE: Primary | ICD-10-CM

## 2023-08-11 PROCEDURE — 99215 OFFICE O/P EST HI 40 MIN: CPT | Mod: PBBFAC | Performed by: NURSE PRACTITIONER

## 2023-08-11 PROCEDURE — 99024 PR POST-OP FOLLOW-UP VISIT: ICD-10-PCS | Mod: ,,, | Performed by: NURSE PRACTITIONER

## 2023-08-11 PROCEDURE — 99024 POSTOP FOLLOW-UP VISIT: CPT | Mod: ,,, | Performed by: NURSE PRACTITIONER

## 2023-08-11 NOTE — PATIENT INSTRUCTIONS
Safety guidelines and activity restrictions are discussed with the patient.  Verbalized understanding.  We discussed that it can take some time for his long finger to wake up if it he had been asleep for protracted period of time prior to surgery.  He states it has.  He also understands that he may not fully regained sensation in that finger.  Sutures removed Steri-Strips applied.  He is advised to keep it covered during the day to prevent sweats dirt from entering the wound.  Leave it open at night during HS to promote wound healing.  We will have return to orthopedic clinic for re-evaluation of his left hand in 2 weeks or sooner as needed.  He was contemplating right carpal tunnel release in the near future.  He will call back for scheduling.

## 2023-08-11 NOTE — PROGRESS NOTES
78-year-old male patient presents ambulatory to orthopedic clinic re-evaluation of his left hand.  He is known to orthopedic having undergone carpal tunnel release of the left wrist on 08/01/2023.  States he continues to have decreased sensation in his long finger with some mild discomfort.  He states it was constantly numb prior to his surgical intervention.  Also states that he is pulled a suture.  He does ambulate with the aid of a rolling walker.  He also has known carpal tunnel syndrome of the right upper extremity.  He states that he would rather wait till after deer season to pursue surgical intervention.      PE:  Physical exam is left hand surgical sites open to air.  There is pulled suture in the proximal 3rd area.  Surgeon sites healing well without sign or symptom of infection.  There is superficial opening of the incision in the proximal 3rd area where the pull sutures noted.  Left radial pulse 2/4.  Capillary refill all digits left hand less than 3 seconds.      Impression:  1.)  10 days following left carpal tunnel release 2.)  Carpal tunnel syndrome right upper extremity     Plan:  Safety guidelines and activity restrictions are discussed with the patient.  Verbalized understanding.  We discussed that it can take some time for his long finger to wake up if it he had been asleep for protracted period of time prior to surgery.  He states it has.  He also understands that he may not fully regained sensation in that finger.  Sutures removed Steri-Strips applied.  He is advised to keep it covered during the day to prevent sweats dirt from entering the wound.  Leave it open at night during HS to promote wound healing.  We will have return to orthopedic clinic for re-evaluation of his left hand in 2 weeks or sooner as needed.  He was contemplating right carpal tunnel release in the near future.  He will call back for scheduling.

## 2023-08-22 ENCOUNTER — DOCUMENT SCAN (OUTPATIENT)
Dept: HOME HEALTH SERVICES | Facility: HOSPITAL | Age: 78
End: 2023-08-22
Payer: MEDICARE

## 2023-08-28 ENCOUNTER — OFFICE VISIT (OUTPATIENT)
Dept: ORTHOPEDICS | Facility: CLINIC | Age: 78
End: 2023-08-28
Payer: MEDICARE

## 2023-08-28 VITALS
DIASTOLIC BLOOD PRESSURE: 86 MMHG | HEIGHT: 71 IN | OXYGEN SATURATION: 99 % | SYSTOLIC BLOOD PRESSURE: 132 MMHG | WEIGHT: 232 LBS | RESPIRATION RATE: 16 BRPM | HEART RATE: 72 BPM | TEMPERATURE: 98 F | BODY MASS INDEX: 32.48 KG/M2

## 2023-08-28 DIAGNOSIS — Z98.890 S/P CARPAL TUNNEL RELEASE: Primary | ICD-10-CM

## 2023-08-28 PROCEDURE — 99024 POSTOP FOLLOW-UP VISIT: CPT | Mod: ,,, | Performed by: NURSE PRACTITIONER

## 2023-08-28 PROCEDURE — 99215 OFFICE O/P EST HI 40 MIN: CPT | Mod: PBBFAC | Performed by: NURSE PRACTITIONER

## 2023-08-28 PROCEDURE — 99024 PR POST-OP FOLLOW-UP VISIT: ICD-10-PCS | Mod: ,,, | Performed by: NURSE PRACTITIONER

## 2023-08-28 NOTE — PROGRESS NOTES
78-year-old male patient presents ambulatory orthopedic clinic re-evaluation of his left hand.  He was found to Orthopedics having undergone carpal tunnel release left wrist on 08/01/2023.  He was last seen orthopedic clinic on 08/11/2023 at which time sutures removed Steri-Strips applied.  Since that visit he is had the wound opened up.  He states that it is had some fin clear yellowish type drainage without odor.  Dr. Dempsey's office was notified.  The patient was instructed to clean with hydrogen peroxide and covered with a Band-Aid.  The opening of the wound is more than likely related to the fact that he is dependent on a rolling walker for ambulation.  This causes him to put excess pressure over the surgical site.      PE:  Physical exam left hand skin is warm and dry.  Surgical sites healing well without sign or symptom of infection.  Superficial opening is noted.  Left radial pulse 2/4.  Capillary refill digits left hand less 3 seconds.  Is able to clinic tight fist.  Able fully extend all digits left hand.      Impression:  4 weeks following carpal tunnel release left wrist     Plan:  Safety guidelines and activity restrictions discussed with the patient.  Verbalized understanding.  Continue wound care as prescribed by Dr. Dempsey.  Wound covered with a large Band-Aid.  Cautioned to if possible put his weight on his metatarsal heads and avoid direct pressure on his wrist.  Verbalizes understanding it does state that is what he has been trying to do.  Return orthopedic clinic 2 weeks follow-up Dr. Dempsey or sooner as needed.

## 2023-08-30 DIAGNOSIS — M25.562 PAIN OF LEFT KNEE AFTER INJURY: Primary | ICD-10-CM

## 2023-08-31 ENCOUNTER — HOSPITAL ENCOUNTER (OUTPATIENT)
Dept: RADIOLOGY | Facility: HOSPITAL | Age: 78
Discharge: HOME | End: 2023-08-31
Attending: NURSE PRACTITIONER
Payer: MEDICARE

## 2023-08-31 ENCOUNTER — OFFICE VISIT (OUTPATIENT)
Dept: ORTHOPEDICS | Facility: CLINIC | Age: 78
End: 2023-08-31
Payer: MEDICARE

## 2023-08-31 VITALS
OXYGEN SATURATION: 97 % | WEIGHT: 232 LBS | HEART RATE: 86 BPM | DIASTOLIC BLOOD PRESSURE: 78 MMHG | RESPIRATION RATE: 20 BRPM | HEIGHT: 71 IN | TEMPERATURE: 98 F | SYSTOLIC BLOOD PRESSURE: 124 MMHG | BODY MASS INDEX: 32.48 KG/M2

## 2023-08-31 DIAGNOSIS — Z96.651 HISTORY OF TOTAL KNEE ARTHROPLASTY, RIGHT: ICD-10-CM

## 2023-08-31 DIAGNOSIS — Z91.81 HISTORY OF FALL: ICD-10-CM

## 2023-08-31 DIAGNOSIS — M25.562 PAIN OF LEFT KNEE AFTER INJURY: ICD-10-CM

## 2023-08-31 DIAGNOSIS — M17.12 PRIMARY OSTEOARTHRITIS OF LEFT KNEE: Primary | ICD-10-CM

## 2023-08-31 PROCEDURE — 99214 OFFICE O/P EST MOD 30 MIN: CPT | Mod: S$PBB,24,, | Performed by: NURSE PRACTITIONER

## 2023-08-31 PROCEDURE — 73562 XR KNEE AP LAT WITH SUNRISE LEFT: ICD-10-PCS | Mod: 26,LT,, | Performed by: RADIOLOGY

## 2023-08-31 PROCEDURE — 73562 X-RAY EXAM OF KNEE 3: CPT | Mod: 26,LT,, | Performed by: RADIOLOGY

## 2023-08-31 PROCEDURE — 99214 OFFICE O/P EST MOD 30 MIN: CPT | Mod: PBBFAC | Performed by: NURSE PRACTITIONER

## 2023-08-31 PROCEDURE — 99214 PR OFFICE/OUTPT VISIT, EST, LEVL IV, 30-39 MIN: ICD-10-PCS | Mod: S$PBB,24,, | Performed by: NURSE PRACTITIONER

## 2023-08-31 PROCEDURE — 73562 X-RAY EXAM OF KNEE 3: CPT | Mod: TC,LT

## 2023-08-31 NOTE — PROGRESS NOTES
78-year-old male patient presents ambulatory orthopedic clinic valuation of his left knee.  Relates a history that on 08/27/2023 he twisted his left knee.  He had some pain and discomfort.  The next morning he had significant amount of swelling.  He iced it and put a brace on his knee.  The swelling has since resolved.  Two days ago he was ambulating outside his home.  There is a small stubbed that had been cut.  He tripped over it causing him to fall.  He landed on his left knee.  He had pain and discomfort with ambulation.  He states today that discomfort and pain is resolved.  He does use arthritis over-the-counter medications.  He states he finds it helpful.  He was known to orthopedic clinic having had right total knee replacement arthroplasty in the past.  Also recently had carpal tunnel release surgery on the left.      X-ray: X-rays today of the right knee AP, lateral and sunrise view shows mild to moderate DJD changes noted tricompartmental distribution.  No evidence of acute fracture, dislocation or pathologic bone noted.      PE: He is noted be ambulating with the aid of a Rollator with no perceptible limp.  Physical exam left knee skin is warm and dry.  There is superficial abrasions on proximal tibial regions bilaterally.  No soft tissue swelling is noted.  No intra-articular effusion appreciated clinically.  Range of motion left knee 0° extension with further flexion to approximately 100°.  There was excellent ligamentous balance instability noted clinically.  There is tenderness to palpation both mediolateral joint line.  There is no crepitance appreciated over the patella with extension flexion of the knee.  Anterior drawer test is negative.  Posterior drawer test is negative.  No tenderness to palpation quad tendon there is no tenderness palpation patella tendon.      Impression:  1.)  DJD knee-left 2.)  History of right total knee replacement arthroplasty     Plan:  Safety guidelines and activity  restrictions are discussed with the patient.  May use over-the-counter NSAID medication of choice per label directions.  We discussed rest.  He was offered intra-articular steroid injection but declines at this time.  States it feels much better.  States he has an appointment in a week or so with Dr. Dempsey in if he needs an injection he will get 1 at that time.  Return to orthopedic clinic as scheduled or sooner as needed.

## 2023-08-31 NOTE — PATIENT INSTRUCTIONS
Safety guidelines and activity restrictions are discussed with the patient.  May use over-the-counter NSAID medication of choice per label directions.  We discussed rest.  He was offered intra-articular steroid injection but declines at this time.  States it feels much better.  States he has an appointment in a week or so with Dr. Dempsey in if he needs an injection he will get 1 at that time.  Return to orthopedic clinic as scheduled or sooner as needed.

## 2023-09-11 ENCOUNTER — OFFICE VISIT (OUTPATIENT)
Dept: ORTHOPEDICS | Facility: CLINIC | Age: 78
End: 2023-09-11
Payer: MEDICARE

## 2023-09-11 DIAGNOSIS — M17.12 PRIMARY OSTEOARTHRITIS OF LEFT KNEE: Primary | ICD-10-CM

## 2023-09-11 PROCEDURE — 99214 OFFICE O/P EST MOD 30 MIN: CPT | Mod: S$PBB,24,25, | Performed by: ORTHOPAEDIC SURGERY

## 2023-09-11 PROCEDURE — 20610 DRAIN/INJ JOINT/BURSA W/O US: CPT | Mod: S$PBB,79,LT, | Performed by: ORTHOPAEDIC SURGERY

## 2023-09-11 PROCEDURE — 99214 OFFICE O/P EST MOD 30 MIN: CPT | Mod: PBBFAC,25 | Performed by: ORTHOPAEDIC SURGERY

## 2023-09-11 PROCEDURE — 99214 PR OFFICE/OUTPT VISIT, EST, LEVL IV, 30-39 MIN: ICD-10-PCS | Mod: S$PBB,24,25, | Performed by: ORTHOPAEDIC SURGERY

## 2023-09-11 PROCEDURE — 20610 DRAIN/INJ JOINT/BURSA W/O US: CPT | Mod: PBBFAC | Performed by: ORTHOPAEDIC SURGERY

## 2023-09-11 PROCEDURE — 20610 PR DRAIN/INJECT LARGE JOINT/BURSA: ICD-10-PCS | Mod: S$PBB,79,LT, | Performed by: ORTHOPAEDIC SURGERY

## 2023-09-11 NOTE — PROGRESS NOTES
CLINIC NOTE       Chief Complaint   Patient presents with    Left Knee - Pain        Usama Lee is a 78 y.o. male seen today for evaluation of left knee pain/injury and recheck of his left hand.  He is undergone left carpal tunnel surgical release.  Doing well at this time.  Left palmar incisions well healed without signs of infection.  He had a fall on 08/27/2023.  He twisted his knee he would subsequent pain and swelling.  He was seen by Angel STEVENS on 08/31/2023.  Plain film x-rays left knee 08/30/2023 shows moderate tricompartmental DJD.  There is no evidence of acute fracture seen.  He is ambulating with a walker.  He is on Eliquis.    Past Medical History:   Diagnosis Date    Anticoagulant long-term use     Arthritis     Cancer     skin    Diabetes     Encounter for blood transfusion     Gout     Heart disease     High cholesterol     HTN (hypertension)     Parkinson disease     Sleep apnea      History reviewed. No pertinent family history.  Current Outpatient Medications on File Prior to Visit   Medication Sig Dispense Refill    acetaminophen (TYLENOL) 650 MG TbSR Take 650 mg by mouth Daily. prn      allopurinoL (ZYLOPRIM) 300 MG tablet TAKE 1 TABLET(S) BY MOUTH DAILY      ALPRAZolam (XANAX) 0.5 MG tablet Take 0.5 mg by mouth.      aspirin (ECOTRIN) 81 MG EC tablet Take 1 tablet by mouth once daily.      atorvastatin (LIPITOR) 80 MG tablet TAKE ONE-HALF TABLET BY MOUTH DAILY FOR CHOLESTEROL. STOP MEDICATION AND CALL PROVIDER FOR ANY UNEXPLAINED MUSCLE ACHES,PAIN OR WEAKNESS      benztropine (COGENTIN) 1 MG tablet Take 1 tablet by mouth nightly.      carbidopa-levodopa  mg (SINEMET)  mg per tablet Take 1 tablet by mouth 3 (three) times daily.      carvediloL (COREG) 25 MG tablet Take 25 mg by mouth.      carvediloL (COREG) 6.25 MG tablet Take 6.25 mg by mouth 2 (two) times daily.      cyanocobalamin 500 MCG tablet 2 tablets      cyclobenzaprine (FLEXERIL) 5 MG tablet Take 5  mg by mouth 3 (three) times daily as needed for Muscle spasms.      doxycycline (MONODOX) 100 MG capsule TAKE ONE CAPSULE BY MOUTH DAILY WITH FOOD      ezetimibe (ZETIA) 10 mg tablet Take 10 mg by mouth.      famotidine (PEPCID) 20 MG tablet 20 mg.      ferrous sulfate (FEOSOL) 325 mg (65 mg iron) Tab tablet Take by mouth.      fluorouraciL (EFUDEX) 5 % cream APPLY TWICE DAILY TO LEFT cheeck FOR SIX WEEKS, WASH off EVERY MORNING      fluticasone propionate (FLONASE) 50 mcg/actuation nasal spray INSTILL 1 SPRAY IN NOSTRIL(S) DAILY      furosemide (LASIX) 40 MG tablet Take 1 tablet (40 mg total) by mouth 2 (two) times daily. 60 tablet 11    gabapentin (NEURONTIN) 400 MG capsule Take 400 mg by mouth.      glipiZIDE (GLUCOTROL) 5 MG tablet 5 mg 2 (two) times daily with meals.      isosorbide mononitrate (IMDUR) 30 MG 24 hr tablet Take 1 tablet by mouth once daily.      meloxicam (MOBIC) 15 MG tablet Take 15 mg by mouth.      memantine (NAMENDA) 10 MG Tab Take 1 tablet (10 mg total) by mouth once daily. 90 tablet 3    nicotine polacrilex 2 MG Lozg DISSOLVE 1 LOZENGE IN MOUTH EVERY 2 HOURS AS NEEDED FOR SMOKING CESSATION      nitroGLYCERIN (NITROSTAT) 0.4 MG SL tablet DISSOLVE ONE TABLET UNDER THE TONGUE EVERY 5 MINUTES AS NEEDED  Strength: 0.4 mg 100 tablet 3    pantoprazole (PROTONIX) 40 MG tablet 40 mg.      potassium chloride (KLOR-CON) 10 MEQ TbSR Take 1 tablet (10 mEq total) by mouth once daily. 90 tablet 3    primidone (MYSOLINE) 50 MG Tab Take 1 tablet (50 mg total) by mouth 3 (three) times daily. 270 tablet 3    promethazine (PHENERGAN) 25 MG tablet Take 1 tablet (25 mg total) by mouth every 4 (four) hours. 45 tablet 0    sertraline (ZOLOFT) 25 MG tablet Take 25 mg by mouth.      SOOLANTRA 1 % Crea by Each Nostril route once daily.      tamsulosin (FLOMAX) 0.4 mg Cap Take 0.4 mg by mouth.      traZODone (DESYREL) 100 MG tablet 50 mg.      triamterene-hydrochlorothiazide 37.5-25 mg (DYAZIDE) 37.5-25 mg per capsule  Take 1 capsule by mouth every morning.       No current facility-administered medications on file prior to visit.       ROS     There were no vitals filed for this visit.    Past Surgical History:   Procedure Laterality Date    APPENDECTOMY      CARDIAC SURGERY      CARPAL TUNNEL RELEASE Left 8/1/2023    Procedure: RELEASE, CARPAL TUNNEL;  Surgeon: Daniel Dempsey MD;  Location: [facility] OR;  Service: Orthopedics;  Laterality: Left;    CERVICAL LAMINECTOMY WITH SPINAL FUSION N/A 04/28/2022    Procedure: C3-C7 Laminectomy and Fusion;  Surgeon: Charbel Blake MD;  Location: [facility] OR;  Service: Neurosurgery;  Laterality: N/A;  Neuro monitoring    FLEXOR TENDON REPAIR Left 08/19/2021    Procedure: REPAIR, TENDON, FLEXOR;  Surgeon: Daniel Dempsey MD;  Location: [facility] OR;  Service: Orthopedics;  Laterality: Left;    KNEE SURGERY      NECK SURGERY      TOTAL KNEE ARTHROPLASTY Right     TRIGGER FINGER RELEASE Left 08/19/2021    Procedure: RELEASE, TRIGGER FINGER,RING;  Surgeon: Daniel Dempsey MD;  Location: [facility] OR;  Service: Orthopedics;  Laterality: Left;        Review of patient's allergies indicates:   Allergen Reactions    Codeine     Lortab [hydrocodone-acetaminophen] Hallucinations    Lovastatin      Other reaction(s): Muscle pain        Ortho Exam : Left knee large intra-articular effusion.  Knee ligaments stable clinically.    Radiographic Examination:    Technique:    Findings:    Impression:   See Above    Assessment and Plan  Patient Active Problem List    Diagnosis Date Noted    History of fall 08/31/2023    S/P carpal tunnel release 08/11/2023    Carpal tunnel syndrome of left wrist 04/03/2023    Anxiety state 11/09/2022    Benign prostatic hyperplasia 11/09/2022    Chronic atrial fibrillation 11/09/2022    Carbuncle and furuncle of upper arm and forearm 11/09/2022    Chronic diastolic heart failure 11/09/2022    Atherosclerosis of native coronary artery with  unstable angina pectoris 11/09/2022    Diabetic neuropathy 11/09/2022    Gastroesophageal reflux disease 11/09/2022    Heart failure 11/09/2022    Iron deficiency anemia 11/09/2022    Low back pain 11/09/2022    Osteoarthritis 11/09/2022    Peripheral neuropathy 11/09/2022    Polyneuropathy due to type 2 diabetes mellitus 11/09/2022    Potassium deficiency 11/09/2022    Obstructive sleep apnea syndrome 11/09/2022    Fall 11/09/2022    Dysphagia 11/09/2022    On long term drug therapy 11/09/2022    Visual hallucinations 05/03/2022    Cervical myelopathy 04/28/2022    HTN (hypertension) 04/28/2022    DM (diabetes mellitus) 04/28/2022    HLD (hyperlipidemia) 04/28/2022    History of total knee arthroplasty, right 09/23/2021    Trigger finger of left hand 07/26/2021    Impression:  Left knee injury/effusion   Plan:  Left knee was prepped with Betadine an arthrocentesis performed yielding 80 cc of blood.    RI CE recommended.  Recheck progress 2 weeks time or sooner for interval problems.  We will consider intra-articular steroid injection left knee if no additional bleeding occurs.      Daniel Dempsey M.D.

## 2023-09-22 PROCEDURE — 88305 PATHOLOGY, DERMATOLOGY: ICD-10-PCS | Mod: 26,,, | Performed by: PATHOLOGY

## 2023-09-22 PROCEDURE — 88305 TISSUE EXAM BY PATHOLOGIST: CPT | Mod: 26,,, | Performed by: PATHOLOGY

## 2023-09-22 PROCEDURE — 88305 TISSUE EXAM BY PATHOLOGIST: CPT | Mod: TC,59,SUR

## 2023-09-25 ENCOUNTER — LAB REQUISITION (OUTPATIENT)
Dept: LAB | Facility: HOSPITAL | Age: 78
End: 2023-09-25
Payer: MEDICARE

## 2023-09-25 ENCOUNTER — OFFICE VISIT (OUTPATIENT)
Dept: ORTHOPEDICS | Facility: CLINIC | Age: 78
End: 2023-09-25
Payer: MEDICARE

## 2023-09-25 VITALS
OXYGEN SATURATION: 99 % | HEART RATE: 64 BPM | TEMPERATURE: 98 F | WEIGHT: 232 LBS | BODY MASS INDEX: 32.48 KG/M2 | RESPIRATION RATE: 12 BRPM | HEIGHT: 71 IN

## 2023-09-25 DIAGNOSIS — D49.2 NEOPLASM OF UNSPECIFIED BEHAVIOR OF BONE, SOFT TISSUE, AND SKIN: ICD-10-CM

## 2023-09-25 DIAGNOSIS — M17.12 PRIMARY OSTEOARTHRITIS OF LEFT KNEE: Primary | ICD-10-CM

## 2023-09-25 PROCEDURE — 20610 PR DRAIN/INJECT LARGE JOINT/BURSA: ICD-10-PCS | Mod: S$PBB,LT,, | Performed by: NURSE PRACTITIONER

## 2023-09-25 PROCEDURE — 99214 PR OFFICE/OUTPT VISIT, EST, LEVL IV, 30-39 MIN: ICD-10-PCS | Mod: S$PBB,25,, | Performed by: NURSE PRACTITIONER

## 2023-09-25 PROCEDURE — 99999PBSHW PR PBB SHADOW TECHNICAL ONLY FILED TO HB: Mod: PBBFAC,,,

## 2023-09-25 PROCEDURE — 20610 DRAIN/INJ JOINT/BURSA W/O US: CPT | Mod: S$PBB,LT,, | Performed by: NURSE PRACTITIONER

## 2023-09-25 PROCEDURE — 99214 OFFICE O/P EST MOD 30 MIN: CPT | Mod: S$PBB,25,, | Performed by: NURSE PRACTITIONER

## 2023-09-25 PROCEDURE — 99999PBSHW PR PBB SHADOW TECHNICAL ONLY FILED TO HB: ICD-10-PCS | Mod: PBBFAC,,,

## 2023-09-25 PROCEDURE — 20610 DRAIN/INJ JOINT/BURSA W/O US: CPT | Mod: PBBFAC,LT | Performed by: NURSE PRACTITIONER

## 2023-09-25 PROCEDURE — 99215 OFFICE O/P EST HI 40 MIN: CPT | Mod: PBBFAC,25 | Performed by: NURSE PRACTITIONER

## 2023-09-25 RX ORDER — BUPIVACAINE HYDROCHLORIDE 2.5 MG/ML
1 INJECTION, SOLUTION INFILTRATION; PERINEURAL
Status: DISCONTINUED | OUTPATIENT
Start: 2023-09-25 | End: 2023-09-25 | Stop reason: HOSPADM

## 2023-09-25 RX ORDER — TRIAMCINOLONE ACETONIDE 40 MG/ML
40 INJECTION, SUSPENSION INTRA-ARTICULAR; INTRAMUSCULAR
Status: DISCONTINUED | OUTPATIENT
Start: 2023-09-25 | End: 2023-09-25 | Stop reason: HOSPADM

## 2023-09-25 RX ADMIN — TRIAMCINOLONE ACETONIDE 40 MG: 40 INJECTION, SUSPENSION INTRA-ARTICULAR; INTRAMUSCULAR at 10:09

## 2023-09-25 RX ADMIN — BUPIVACAINE HYDROCHLORIDE 1 ML: 2.5 INJECTION, SOLUTION INFILTRATION; PERINEURAL at 10:09

## 2023-09-25 NOTE — PATIENT INSTRUCTIONS
Safety guidelines activity restrictions discussed with the patient.  Verbalized understanding.  Left knee prepped with Betadine.  Intra-articular triamcinolone 40 mg and 0.25% bupivacaine 1 cc instilled without difficulty.  Return to orthopedic clinic on a as needed basis.

## 2023-09-25 NOTE — PROCEDURES
Large Joint Aspiration/Injection    Date/Time: 9/25/2023 10:30 AM    Performed by: Angel Roman FNP  Authorized by: Angel Roman FNP    Consent Done?:  Not Needed  Indications:  Arthritis and pain  Site marked: the procedure site was marked    Timeout: prior to procedure the correct patient, procedure, and site was verified      Local anesthesia used?: Yes    Local anesthetic:  Topical anesthetic    Details:  Needle Size:  18 G  Ultrasonic Guidance for needle placement?: No    Approach:  Lateral  Medications:  1 mL BUPivacaine 0.25% (2.5 mg/ml) 0.25 % (2.5 mg/mL); 40 mg triamcinolone acetonide 40 mg/mL  Aspirate amount (mL):  0  Patient tolerance:  Patient tolerated the procedure well with no immediate complications     Right knee prepped with Betadine.  Arthrocentesis performed yielding 0 aspirate.  This is followed by installation of triamcinolone 40 mg and 0.25% bupivacaine 1 cc

## 2023-09-25 NOTE — PROGRESS NOTES
78-year-old male patient presents ambulatory orthopedic clinic re-evaluation of his left knee.  He was last seen orthopedic clinic on 09/11/2023 by Dr. Dempsey.  He had arthrocentesis performed yielding 80 cc of bloody drainage.  He presents today for consideration of intra-articular steroid injection.  Previously noted effusion is no longer present.  Denies fever, chills or any sign or symptom of infection.      PE:  Physical exam she is noted be ambulating with the aid of a Rollator.  No perceptible limp noted.  Physical exam left knee skin is warm dry and intact.  No soft tissue swelling noted.  Minimal intra-articular effusion appreciated clinically.  Range motion right knee 0° extension further flexion approximately 100°.  There was excellent ligamentous balance instability noted clinically.  Anterior drawer test negative.  Posterior drawer test negative.    Impression:  Primary localized DJD knee-left     Plan: Safety guidelines activity restrictions discussed with the patient.  Verbalized understanding.  Left knee prepped with Betadine.  Intra-articular triamcinolone 40 mg and 0.25% bupivacaine 1 cc instilled without difficulty.  Return to orthopedic clinic on a as needed basis.

## 2023-10-16 ENCOUNTER — OFFICE VISIT (OUTPATIENT)
Dept: INTERNAL MEDICINE | Facility: CLINIC | Age: 78
End: 2023-10-16
Payer: MEDICARE

## 2023-10-16 VITALS
SYSTOLIC BLOOD PRESSURE: 120 MMHG | HEART RATE: 73 BPM | HEIGHT: 71 IN | WEIGHT: 234 LBS | DIASTOLIC BLOOD PRESSURE: 78 MMHG | RESPIRATION RATE: 18 BRPM | TEMPERATURE: 98 F | BODY MASS INDEX: 32.76 KG/M2 | OXYGEN SATURATION: 96 %

## 2023-10-16 DIAGNOSIS — E78.5 HYPERLIPIDEMIA LDL GOAL <100: ICD-10-CM

## 2023-10-16 DIAGNOSIS — E11.9 CONTROLLED TYPE 2 DIABETES MELLITUS WITHOUT COMPLICATION, WITHOUT LONG-TERM CURRENT USE OF INSULIN: ICD-10-CM

## 2023-10-16 DIAGNOSIS — I10 ESSENTIAL HYPERTENSION: Primary | ICD-10-CM

## 2023-10-16 DIAGNOSIS — I48.0 PAROXYSMAL ATRIAL FIBRILLATION: ICD-10-CM

## 2023-10-16 DIAGNOSIS — N40.0 BENIGN PROSTATIC HYPERPLASIA, UNSPECIFIED WHETHER LOWER URINARY TRACT SYMPTOMS PRESENT: ICD-10-CM

## 2023-10-16 PROCEDURE — 99215 OFFICE O/P EST HI 40 MIN: CPT | Mod: PBBFAC | Performed by: INTERNAL MEDICINE

## 2023-10-16 PROCEDURE — 99214 OFFICE O/P EST MOD 30 MIN: CPT | Mod: S$PBB,,, | Performed by: INTERNAL MEDICINE

## 2023-10-16 PROCEDURE — 99214 PR OFFICE/OUTPT VISIT, EST, LEVL IV, 30-39 MIN: ICD-10-PCS | Mod: S$PBB,,, | Performed by: INTERNAL MEDICINE

## 2023-10-16 RX ORDER — FUROSEMIDE 40 MG/1
40 TABLET ORAL 2 TIMES DAILY
Qty: 60 TABLET | Refills: 11 | Status: SHIPPED | OUTPATIENT
Start: 2023-10-16 | End: 2024-10-15

## 2023-10-16 RX ORDER — TAMSULOSIN HYDROCHLORIDE 0.4 MG/1
0.4 CAPSULE ORAL DAILY
Qty: 90 CAPSULE | Refills: 3 | Status: SHIPPED | OUTPATIENT
Start: 2023-10-16

## 2023-10-16 NOTE — PROGRESS NOTES
Subjective:       Patient ID: Usama Lee is a 78 y.o. male.    Chief Complaint: Follow-up    Falling lots hurt hand got carpel tunnel on  does not have parkinsonismm and cannot take tremor pill even though it worked    Follow-up  Pertinent negatives include no arthralgias, change in bowel habit, chest pain, fatigue or rash.   Review of Systems   Constitutional:  Negative for appetite change, fatigue and unexpected weight change.   HENT:  Negative for postnasal drip, trouble swallowing and goiter.    Eyes:  Negative for visual disturbance.   Respiratory:  Negative for apnea, choking and chest tightness.    Cardiovascular:  Negative for chest pain and leg swelling.   Gastrointestinal:  Negative for blood in stool and change in bowel habit.   Genitourinary:  Negative for difficulty urinating and frequency.   Musculoskeletal:  Negative for arthralgias, back pain and leg pain.   Integumentary:  Negative for rash.   Neurological:  Negative for memory loss and coordination difficulties.   Hematological:  Negative for adenopathy.   Psychiatric/Behavioral:  Negative for agitation. The patient is not hyperactive.        Objective:      Physical Exam  Vitals and nursing note reviewed.   Constitutional:       Appearance: Normal appearance. He is obese.   HENT:      Head: Normocephalic and atraumatic.      Right Ear: Tympanic membrane, ear canal and external ear normal.      Left Ear: Tympanic membrane, ear canal and external ear normal.      Nose: Nose normal.      Mouth/Throat:      Mouth: Mucous membranes are moist.      Pharynx: Oropharynx is clear.   Eyes:      Extraocular Movements: Extraocular movements intact.      Conjunctiva/sclera: Conjunctivae normal.      Pupils: Pupils are equal, round, and reactive to light.   Cardiovascular:      Rate and Rhythm: Normal rate and regular rhythm.      Pulses: Normal pulses.      Heart sounds: Normal heart sounds.   Pulmonary:      Effort: Pulmonary effort is normal.       Breath sounds: Normal breath sounds.   Abdominal:      General: Abdomen is flat. Bowel sounds are normal.      Palpations: Abdomen is soft.   Musculoskeletal:         General: Normal range of motion.      Cervical back: Normal range of motion.   Skin:     General: Skin is warm and dry.      Capillary Refill: Capillary refill takes less than 2 seconds.   Neurological:      General: No focal deficit present.      Mental Status: He is alert and oriented to person, place, and time.   Psychiatric:         Mood and Affect: Mood normal.         Behavior: Behavior normal.         Thought Content: Thought content normal.         Judgment: Judgment normal.       Assessment:       1. Essential hypertension    2. Hyperlipidemia LDL goal <100    3. Paroxysmal atrial fibrillation    4. Benign prostatic hyperplasia, unspecified whether lower urinary tract symptoms present    5. Controlled type 2 diabetes mellitus without complication, without long-term current use of insulin        Plan:         Patient Instructions   Continue current meds and f/u 6 months      Problem List Items Addressed This Visit          Renal/    Benign prostatic hyperplasia     Other Visit Diagnoses       Essential hypertension    -  Primary    Relevant Medications    tamsulosin (FLOMAX) 0.4 mg Cap    furosemide (LASIX) 40 MG tablet    Hyperlipidemia LDL goal <100        Paroxysmal atrial fibrillation        Controlled type 2 diabetes mellitus without complication, without long-term current use of insulin

## 2023-12-09 DIAGNOSIS — Z71.89 COMPLEX CARE COORDINATION: ICD-10-CM

## 2024-03-21 ENCOUNTER — HOSPITAL ENCOUNTER (OUTPATIENT)
Dept: RADIOLOGY | Facility: HOSPITAL | Age: 79
Discharge: HOME | End: 2024-03-21
Attending: INTERNAL MEDICINE
Payer: MEDICARE

## 2024-03-21 DIAGNOSIS — R20.0 NUMBNESS OF THUMB: ICD-10-CM

## 2024-03-21 PROCEDURE — 72125 CT NECK SPINE W/O DYE: CPT | Mod: TC

## 2024-03-21 PROCEDURE — 72125 CT NECK SPINE W/O DYE: CPT | Mod: 26,,, | Performed by: RADIOLOGY

## 2024-03-28 DIAGNOSIS — Z09 FOLLOW-UP EXAMINATION AFTER ORTHOPEDIC SURGERY: Primary | ICD-10-CM

## 2024-04-01 ENCOUNTER — OFFICE VISIT (OUTPATIENT)
Dept: SPINE | Facility: CLINIC | Age: 79
End: 2024-04-01
Payer: MEDICARE

## 2024-04-01 ENCOUNTER — HOSPITAL ENCOUNTER (OUTPATIENT)
Dept: RADIOLOGY | Facility: HOSPITAL | Age: 79
Discharge: HOME | End: 2024-04-01
Attending: ORTHOPAEDIC SURGERY
Payer: MEDICARE

## 2024-04-01 DIAGNOSIS — M54.12 CERVICAL RADICULOPATHY: ICD-10-CM

## 2024-04-01 DIAGNOSIS — G95.9 CERVICAL MYELOPATHY: Primary | ICD-10-CM

## 2024-04-01 DIAGNOSIS — M50.30 DDD (DEGENERATIVE DISC DISEASE), CERVICAL: ICD-10-CM

## 2024-04-01 DIAGNOSIS — Z09 FOLLOW-UP EXAMINATION AFTER ORTHOPEDIC SURGERY: ICD-10-CM

## 2024-04-01 PROCEDURE — 99212 OFFICE O/P EST SF 10 MIN: CPT | Mod: PBBFAC,25 | Performed by: ORTHOPAEDIC SURGERY

## 2024-04-01 PROCEDURE — 72040 X-RAY EXAM NECK SPINE 2-3 VW: CPT | Mod: TC

## 2024-04-01 PROCEDURE — 99214 OFFICE O/P EST MOD 30 MIN: CPT | Mod: S$PBB,,, | Performed by: ORTHOPAEDIC SURGERY

## 2024-04-01 PROCEDURE — 72040 X-RAY EXAM NECK SPINE 2-3 VW: CPT | Mod: 26,,, | Performed by: ORTHOPAEDIC SURGERY

## 2024-04-01 NOTE — PATIENT INSTRUCTIONS
Arrive to the ground floor of the ambulatory surgery building at 7:30am on 04/10/2024, Monday at 7:30AM.  Do not eat or drink after midnight.  Bring medications.  Bring a .

## 2024-04-01 NOTE — PROGRESS NOTES
AP, lateral views of the cervical spine reviewed    On the AP there is normal coronal alignment.  There is uncovertebral and facet hypertrophy seen.  On the lateral there is loss of cervical lordosis.  There are spondylotic changes noted with decrease in disc height and osteophyte formation seen.  Status post C3-C7 laminectomy and fusion.  There appears to be lucency around the C7 screws bilaterally.    Impression:  Degenerative changes of cervical spine as noted above

## 2024-04-04 NOTE — PROGRESS NOTES
MDM/time:  Greater than 30 minutes spent on this encounter including 10 minutes reviewing imaging and notes, 15 minutes with the patient, 5 minutes documentation    ASSESSMENT:  78 y.o. male with cervical spondylosis and myelopathy now status post C3-C7 laminectomy and fusion 04/28/2022    PLAN:  EMG bilateral upper extremities.  CT myelogram cervical spine    HPI:  78 y.o. male here for repeat evaluation of cervical spondylosis and myelopathy now status post C3-C7 laminectomy and fusion 04/28/2022.  Reports decreased cervical range motion.  Also still complaining of numbness in his fingers bilaterally.  He had a left carpal tunnel release with Dr. Dempsey without any improvement.    IMAGING:  X-ray cervical spine reviewed show:  On the AP there is normal coronal alignment.  There is uncovertebral and facet hypertrophy seen.  On the lateral there is loss of cervical lordosis.  There are spondylotic changes noted with decrease in disc height and osteophyte formation seen.  Status post C3-C7 laminectomy and fusion.    Past Medical History:   Diagnosis Date    Anticoagulant long-term use     Arthritis     Cancer     skin    Diabetes     Encounter for blood transfusion     Gout     Heart disease     High cholesterol     HTN (hypertension)     Parkinson disease     Sleep apnea      Past Surgical History:   Procedure Laterality Date    APPENDECTOMY      CARDIAC SURGERY      CARPAL TUNNEL RELEASE Left 8/1/2023    Procedure: RELEASE, CARPAL TUNNEL;  Surgeon: Daniel Dempsey MD;  Location: [facility] OR;  Service: Orthopedics;  Laterality: Left;    CERVICAL LAMINECTOMY WITH SPINAL FUSION N/A 04/28/2022    Procedure: C3-C7 Laminectomy and Fusion;  Surgeon: Charbel Blake MD;  [facility] OR;  Service: Neurosurgery;  Laterality: N/A;  Neuro monitoring    FLEXOR TENDON REPAIR Left 08/19/2021    Procedure: REPAIR, TENDON, FLEXOR;  Surgeon: Daniel Dempsey MD;  Location: [facility] OR;  Service:  Orthopedics;  Laterality: Left;    KNEE SURGERY      NECK SURGERY      TOTAL KNEE ARTHROPLASTY Right     TRIGGER FINGER RELEASE Left 08/19/2021    Procedure: RELEASE, TRIGGER FINGER,RING;  Surgeon: Daniel Dempsey MD;  Location: [facility];  Service: Orthopedics;  Laterality: Left;     Social History     Tobacco Use    Smoking status: Never    Smokeless tobacco: Former   Substance Use Topics    Alcohol use: Not Currently    Drug use: Never      Current Outpatient Medications   Medication Instructions    acetaminophen (TYLENOL) 650 mg, Oral, Daily, prn    allopurinoL (ZYLOPRIM) 300 MG tablet TAKE 1 TABLET(S) BY MOUTH DAILY    ALPRAZolam (XANAX) 0.5 mg, Oral    apixaban (ELIQUIS) 5 mg    aspirin (ECOTRIN) 81 MG EC tablet 1 tablet, Oral, Daily    atorvastatin (LIPITOR) 80 MG tablet TAKE ONE-HALF TABLET BY MOUTH DAILY FOR CHOLESTEROL. STOP MEDICATION AND CALL PROVIDER FOR ANY UNEXPLAINED MUSCLE ACHES,PAIN OR WEAKNESS    benztropine (COGENTIN) 1 MG tablet 1 tablet, Oral, Nightly    carbidopa-levodopa  mg (SINEMET)  mg per tablet 1 tablet, Oral, 3 times daily    carvediloL (COREG) 25 mg, Oral    carvediloL (COREG) 6.25 mg, Oral, 2 times daily    cyanocobalamin 500 MCG tablet 2 tablets    cyclobenzaprine (FLEXERIL) 5 mg, Oral, 3 times daily PRN    doxycycline (MONODOX) 100 MG capsule TAKE ONE CAPSULE BY MOUTH DAILY WITH FOOD    ezetimibe (ZETIA) 10 mg, Oral    famotidine (PEPCID) 20 mg    ferrous sulfate (FEOSOL) 325 mg (65 mg iron) Tab tablet Oral    fluorouraciL (EFUDEX) 5 % cream APPLY TWICE DAILY TO LEFT cheeck FOR SIX WEEKS, WASH off EVERY MORNING    fluticasone propionate (FLONASE) 50 mcg/actuation nasal spray INSTILL 1 SPRAY IN NOSTRIL(S) DAILY    furosemide (LASIX) 40 mg, Oral, 2 times daily    gabapentin (NEURONTIN) 400 mg, Oral    glipiZIDE (GLUCOTROL) 5 mg, 2 times daily with meals    isosorbide mononitrate (IMDUR) 30 MG 24 hr tablet 1 tablet, Oral, Daily    meloxicam (MOBIC) 15 mg, Oral     memantine (NAMENDA) 10 mg, Oral, Daily    nicotine polacrilex 2 MG Lozg DISSOLVE 1 LOZENGE IN MOUTH EVERY 2 HOURS AS NEEDED FOR SMOKING CESSATION    nitroGLYCERIN (NITROSTAT) 0.4 MG SL tablet DISSOLVE ONE TABLET UNDER THE TONGUE EVERY 5 MINUTES AS NEEDED<BR>Strength: 0.4 mg    pantoprazole (PROTONIX) 40 mg    potassium chloride (KLOR-CON) 10 MEQ TbSR 10 mEq, Oral, Daily    primidone (MYSOLINE) 50 mg, Oral, 3 times daily    promethazine (PHENERGAN) 25 mg, Oral, Every 4 hours    sertraline (ZOLOFT) 25 mg, Oral    SOOLANTRA 1 % Crea Each Nostril, Daily    tamsulosin (FLOMAX) 0.4 mg, Oral, Daily    traZODone (DESYREL) 50 mg    triamterene-hydrochlorothiazide 37.5-25 mg (DYAZIDE) 37.5-25 mg per capsule 1 capsule, Oral, Every morning        EXAM:  Constitutional  General Appearance:  There is no height or weight on file to calculate BMI., NAD  Psychiatric   Orientation: Oriented to time, oriented to place, oriented to person  Mood and Affect: Active and alert, normal mood, normal affect  Gait and Station   Appearance:  Normal gait, normal tandem gait, able to walk on toes, able to walk on heels  Healed incision  5/5 strength  Sensation intact  2+ pulses

## 2024-04-10 ENCOUNTER — HOSPITAL ENCOUNTER (OUTPATIENT)
Dept: RADIOLOGY | Facility: HOSPITAL | Age: 79
Discharge: HOME | End: 2024-04-10
Attending: ORTHOPAEDIC SURGERY
Payer: MEDICARE

## 2024-04-10 VITALS
TEMPERATURE: 98 F | DIASTOLIC BLOOD PRESSURE: 74 MMHG | RESPIRATION RATE: 18 BRPM | SYSTOLIC BLOOD PRESSURE: 125 MMHG | HEART RATE: 60 BPM | OXYGEN SATURATION: 99 % | BODY MASS INDEX: 33.88 KG/M2 | WEIGHT: 242 LBS | HEIGHT: 71 IN

## 2024-04-10 DIAGNOSIS — G95.9 CERVICAL MYELOPATHY: ICD-10-CM

## 2024-04-10 LAB
APTT PPP: 32 SECONDS (ref 25.2–37.3)
BASOPHILS # BLD AUTO: 0.02 K/UL (ref 0–0.2)
BASOPHILS NFR BLD AUTO: 0.4 % (ref 0–1)
DIFFERENTIAL METHOD BLD: ABNORMAL
EOSINOPHIL # BLD AUTO: 0.1 K/UL (ref 0–0.5)
EOSINOPHIL NFR BLD AUTO: 1.9 % (ref 1–4)
ERYTHROCYTE [DISTWIDTH] IN BLOOD BY AUTOMATED COUNT: 13.4 % (ref 11.5–14.5)
GLUCOSE SERPL-MCNC: 144 MG/DL (ref 70–105)
HCT VFR BLD AUTO: 38.9 % (ref 40–54)
HGB BLD-MCNC: 13.1 G/DL (ref 13.5–18)
IMM GRANULOCYTES # BLD AUTO: 0.01 K/UL (ref 0–0.04)
IMM GRANULOCYTES NFR BLD: 0.2 % (ref 0–0.4)
INR BLD: 1.04
LYMPHOCYTES # BLD AUTO: 1.31 K/UL (ref 1–4.8)
LYMPHOCYTES NFR BLD AUTO: 24.3 % (ref 27–41)
MCH RBC QN AUTO: 31.2 PG (ref 27–31)
MCHC RBC AUTO-ENTMCNC: 33.7 G/DL (ref 32–36)
MCV RBC AUTO: 92.6 FL (ref 80–96)
MONOCYTES # BLD AUTO: 0.51 K/UL (ref 0–0.8)
MONOCYTES NFR BLD AUTO: 9.5 % (ref 2–6)
MPC BLD CALC-MCNC: 9.7 FL (ref 9.4–12.4)
NEUTROPHILS # BLD AUTO: 3.43 K/UL (ref 1.8–7.7)
NEUTROPHILS NFR BLD AUTO: 63.7 % (ref 53–65)
NRBC # BLD AUTO: 0 X10E3/UL
NRBC, AUTO (.00): 0 %
PLATELET # BLD AUTO: 133 K/UL (ref 150–400)
PROTHROMBIN TIME: 13.5 SECONDS (ref 11.7–14.7)
RBC # BLD AUTO: 4.2 M/UL (ref 4.6–6.2)
WBC # BLD AUTO: 5.38 K/UL (ref 4.5–11)

## 2024-04-10 PROCEDURE — 82962 GLUCOSE BLOOD TEST: CPT

## 2024-04-10 PROCEDURE — 72126 CT NECK SPINE W/DYE: CPT | Mod: 26,,, | Performed by: STUDENT IN AN ORGANIZED HEALTH CARE EDUCATION/TRAINING PROGRAM

## 2024-04-10 PROCEDURE — 25500020 PHARM REV CODE 255: Performed by: ORTHOPAEDIC SURGERY

## 2024-04-10 PROCEDURE — 72126 CT NECK SPINE W/DYE: CPT | Mod: TC

## 2024-04-10 PROCEDURE — 85025 COMPLETE CBC W/AUTO DIFF WBC: CPT | Performed by: STUDENT IN AN ORGANIZED HEALTH CARE EDUCATION/TRAINING PROGRAM

## 2024-04-10 PROCEDURE — 62302 MYELOGRAPHY LUMBAR INJECTION: CPT

## 2024-04-10 PROCEDURE — 85730 THROMBOPLASTIN TIME PARTIAL: CPT | Performed by: STUDENT IN AN ORGANIZED HEALTH CARE EDUCATION/TRAINING PROGRAM

## 2024-04-10 PROCEDURE — 25000003 PHARM REV CODE 250: Performed by: STUDENT IN AN ORGANIZED HEALTH CARE EDUCATION/TRAINING PROGRAM

## 2024-04-10 RX ORDER — DIAZEPAM 5 MG/1
5 TABLET ORAL ONCE
Status: COMPLETED | OUTPATIENT
Start: 2024-04-10 | End: 2024-04-10

## 2024-04-10 RX ORDER — IOPAMIDOL 408 MG/ML
10 INJECTION, SOLUTION INTRATHECAL
Status: COMPLETED | OUTPATIENT
Start: 2024-04-10 | End: 2024-04-10

## 2024-04-10 RX ADMIN — DIAZEPAM 5 MG: 5 TABLET ORAL at 08:04

## 2024-04-10 RX ADMIN — IOPAMIDOL 10 ML: 408 INJECTION, SOLUTION INTRATHECAL at 10:04

## 2024-04-10 NOTE — PROGRESS NOTES
LUMBAR MYELOGRAM  Performed by A Ian RRA  Consent obtained for lumbar myelogram.  A formal timeout was called all staff present agree patient procedure.  The lower back was prepped with Betadine and sterile field was established.  1% lidocaine was used as local anesthetic.  Under fluoroscopic guidance a 22 gauge spinal needle was placed at the L4-L5 interspace.  40 cc Isovue-300 M was injected into the intrathecal sac.  The needle was removed and the puncture site was cleaned and bandaged.  The patient tolerated the procedure well there were no immediate postprocedure complications.  Total fluoroscopy time was 3 minutes 20 seconds.

## 2024-04-24 ENCOUNTER — TELEPHONE (OUTPATIENT)
Dept: SPINE | Facility: CLINIC | Age: 79
End: 2024-04-24
Payer: MEDICARE

## 2024-04-24 NOTE — TELEPHONE ENCOUNTER
Left patient message on his voicemail.  He would like to go over CT myelogram results that showed satisfactory decompression no surgery needed at this time also call in to check on when his nerve conduction test has been scheduled.

## 2024-05-01 ENCOUNTER — TELEPHONE (OUTPATIENT)
Dept: SPINE | Facility: CLINIC | Age: 79
End: 2024-05-01
Payer: MEDICARE

## 2024-05-01 NOTE — TELEPHONE ENCOUNTER
"Mr. Lee asked Leanna to discuss with us the CT myelogram and wants to know if he needs to keep the appt in [place] for EMG nerve conduction study for 05/22.    Discussed with her that Dr. Blake reviewed the Ct myelogram and per Dr. Blake "satisfactory decompression, no surgery needed, FU with EMG."    She verbalizes understanding and we will call them once we have the EMG results.   "

## 2024-07-01 DIAGNOSIS — S79.911A HIP INJURY, RIGHT, INITIAL ENCOUNTER: Primary | ICD-10-CM

## 2024-07-02 ENCOUNTER — OFFICE VISIT (OUTPATIENT)
Dept: ORTHOPEDICS | Facility: CLINIC | Age: 79
End: 2024-07-02
Payer: MEDICARE

## 2024-07-02 ENCOUNTER — HOSPITAL ENCOUNTER (OUTPATIENT)
Dept: RADIOLOGY | Facility: HOSPITAL | Age: 79
Discharge: HOME | End: 2024-07-02
Payer: MEDICARE

## 2024-07-02 DIAGNOSIS — S70.01XA CONTUSION OF RIGHT HIP AND THIGH, INITIAL ENCOUNTER: ICD-10-CM

## 2024-07-02 DIAGNOSIS — S79.911A HIP INJURY, RIGHT, INITIAL ENCOUNTER: ICD-10-CM

## 2024-07-02 DIAGNOSIS — S89.91XA INJURY OF RIGHT KNEE, INITIAL ENCOUNTER: ICD-10-CM
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
## 2024-07-02 DIAGNOSIS — Z96.651 HISTORY OF TOTAL KNEE ARTHROPLASTY, RIGHT: ICD-10-CM

## 2024-07-02 DIAGNOSIS — S70.11XA CONTUSION OF RIGHT HIP AND THIGH, INITIAL ENCOUNTER: ICD-10-CM

## 2024-07-02 DIAGNOSIS — S89.91XA INJURY OF RIGHT KNEE, INITIAL ENCOUNTER: Primary | ICD-10-CM

## 2024-07-02 PROCEDURE — 73502 X-RAY EXAM HIP UNI 2-3 VIEWS: CPT | Mod: TC,RT

## 2024-07-02 PROCEDURE — 73562 X-RAY EXAM OF KNEE 3: CPT | Mod: 26,RT,, | Performed by: RADIOLOGY

## 2024-07-02 PROCEDURE — 99212 OFFICE O/P EST SF 10 MIN: CPT | Mod: PBBFAC,25

## 2024-07-02 PROCEDURE — 73502 X-RAY EXAM HIP UNI 2-3 VIEWS: CPT | Mod: 26,RT,, | Performed by: RADIOLOGY

## 2024-07-02 PROCEDURE — 73562 X-RAY EXAM OF KNEE 3: CPT | Mod: TC,RT

## 2024-07-02 PROCEDURE — 99999 PR PBB SHADOW E&M-EST. PATIENT-LVL II: CPT | Mod: PBBFAC,,,

## 2024-07-02 NOTE — PROGRESS NOTES
CC: No chief complaint on file.         Usama Lee is a 79 y.o. male seen today for follow up of No chief complaint on file.  Patient known to Dr. Dempsey for previous right total knee arthroplasty presents today after a recent fall 2 weeks ago.  Patient states he was carrying a large basket of pears when he suddenly fell onto his right hip and knee.  He reports bruising of his right hip after the injury.  He states that his knee pain has improved with time.  He does not remember twisting his knee during the injury.  He denies any pain with weight-bearing of the right lower extremity.  He denies any groin pain today.  No other complaints today.      PAST MEDICAL HISTORY:   Past Medical History:   Diagnosis Date    Anticoagulant long-term use     Arthritis     Cancer     skin    Diabetes     Encounter for blood transfusion     Gout     Heart disease     High cholesterol     HTN (hypertension)     Parkinson disease     Sleep apnea         PAST SURGICAL HISTORY:   Past Surgical History:   Procedure Laterality Date    APPENDECTOMY      CARDIAC SURGERY      CARPAL TUNNEL RELEASE Left 8/1/2023    Procedure: RELEASE, CARPAL TUNNEL;  Surgeon: Daniel Dempsey MD;  Location: [facility];  Service: Orthopedics;  Laterality: Left;    CERVICAL LAMINECTOMY WITH SPINAL FUSION N/A 04/28/2022    Procedure: C3-C7 Laminectomy and Fusion;  Surgeon: Charbel Blake MD;  Location: [facility] OR;  Service: Neurosurgery;  Laterality: N/A;  Neuro monitoring    FLEXOR TENDON REPAIR Left 08/19/2021    Procedure: REPAIR, TENDON, FLEXOR;  Surgeon: Daniel Dempsey MD;  Location: [facility] OR;  Service: Orthopedics;  Laterality: Left;    KNEE SURGERY      NECK SURGERY      TOTAL KNEE ARTHROPLASTY Right     TRIGGER FINGER RELEASE Left 08/19/2021    Procedure: RELEASE, TRIGGER FINGER,RING;  Surgeon: Daniel Dempsey MD;  Location: [facility] OR;  Service: Orthopedics;  Laterality: Left;         ALLERGIES:   Review of patient's allergies indicates:   Allergen Reactions    Codeine     Lortab [hydrocodone-acetaminophen] Hallucinations    Lovastatin      Other reaction(s): Muscle pain        MEDICATIONS :    Current Outpatient Medications:     acetaminophen (TYLENOL) 650 MG TbSR, Take 650 mg by mouth Daily. prn, Disp: , Rfl:     allopurinoL (ZYLOPRIM) 300 MG tablet, TAKE 1 TABLET(S) BY MOUTH DAILY, Disp: , Rfl:     ALPRAZolam (XANAX) 0.5 MG tablet, Take 0.5 mg by mouth., Disp: , Rfl:     apixaban (ELIQUIS) 5 mg Tab, 5 mg., Disp: , Rfl:     aspirin (ECOTRIN) 81 MG EC tablet, Take 1 tablet by mouth once daily., Disp: , Rfl:     atorvastatin (LIPITOR) 80 MG tablet, TAKE ONE-HALF TABLET BY MOUTH DAILY FOR CHOLESTEROL. STOP MEDICATION AND CALL PROVIDER FOR ANY UNEXPLAINED MUSCLE ACHES,PAIN OR WEAKNESS, Disp: , Rfl:     benztropine (COGENTIN) 1 MG tablet, Take 1 tablet by mouth nightly., Disp: , Rfl:     carbidopa-levodopa  mg (SINEMET)  mg per tablet, Take 1 tablet by mouth 3 (three) times daily., Disp: , Rfl:     carvediloL (COREG) 25 MG tablet, Take 25 mg by mouth., Disp: , Rfl:     carvediloL (COREG) 6.25 MG tablet, Take 6.25 mg by mouth 2 (two) times daily., Disp: , Rfl:     cyanocobalamin 500 MCG tablet, 2 tablets, Disp: , Rfl:     cyclobenzaprine (FLEXERIL) 5 MG tablet, Take 5 mg by mouth 3 (three) times daily as needed for Muscle spasms., Disp: , Rfl:     doxycycline (MONODOX) 100 MG capsule, TAKE ONE CAPSULE BY MOUTH DAILY WITH FOOD, Disp: , Rfl:     ezetimibe (ZETIA) 10 mg tablet, Take 10 mg by mouth., Disp: , Rfl:     famotidine (PEPCID) 20 MG tablet, 20 mg., Disp: , Rfl:     ferrous sulfate (FEOSOL) 325 mg (65 mg iron) Tab tablet, Take by mouth., Disp: , Rfl:     fluorouraciL (EFUDEX) 5 % cream, APPLY TWICE DAILY TO LEFT cheeck FOR SIX WEEKS, WASH off EVERY MORNING, Disp: , Rfl:     fluticasone propionate (FLONASE) 50 mcg/actuation nasal spray, INSTILL 1 SPRAY IN NOSTRIL(S) DAILY, Disp: ,  Rfl:     furosemide (LASIX) 40 MG tablet, Take 1 tablet (40 mg total) by mouth 2 (two) times daily., Disp: 60 tablet, Rfl: 11    gabapentin (NEURONTIN) 400 MG capsule, Take 400 mg by mouth., Disp: , Rfl:     glipiZIDE (GLUCOTROL) 5 MG tablet, 5 mg 2 (two) times daily with meals., Disp: , Rfl:     isosorbide mononitrate (IMDUR) 30 MG 24 hr tablet, Take 1 tablet by mouth once daily., Disp: , Rfl:     meloxicam (MOBIC) 15 MG tablet, Take 15 mg by mouth., Disp: , Rfl:     memantine (NAMENDA) 10 MG Tab, Take 1 tablet (10 mg total) by mouth once daily., Disp: 90 tablet, Rfl: 3    nicotine polacrilex 2 MG Lozg, DISSOLVE 1 LOZENGE IN MOUTH EVERY 2 HOURS AS NEEDED FOR SMOKING CESSATION, Disp: , Rfl:     nitroGLYCERIN (NITROSTAT) 0.4 MG SL tablet, DISSOLVE ONE TABLET UNDER THE TONGUE EVERY 5 MINUTES AS NEEDED Strength: 0.4 mg, Disp: 100 tablet, Rfl: 3    pantoprazole (PROTONIX) 40 MG tablet, 40 mg., Disp: , Rfl:     potassium chloride (KLOR-CON) 10 MEQ TbSR, Take 1 tablet (10 mEq total) by mouth once daily., Disp: 90 tablet, Rfl: 3    primidone (MYSOLINE) 50 MG Tab, Take 1 tablet (50 mg total) by mouth 3 (three) times daily. (Patient not taking: Reported on 10/16/2023), Disp: 270 tablet, Rfl: 3    promethazine (PHENERGAN) 25 MG tablet, Take 1 tablet (25 mg total) by mouth every 4 (four) hours., Disp: 45 tablet, Rfl: 0    sertraline (ZOLOFT) 25 MG tablet, Take 25 mg by mouth., Disp: , Rfl:     SOOLANTRA 1 % Crea, by Each Nostril route once daily., Disp: , Rfl:     tamsulosin (FLOMAX) 0.4 mg Cap, Take 1 capsule (0.4 mg total) by mouth once daily., Disp: 90 capsule, Rfl: 3    traZODone (DESYREL) 100 MG tablet, 50 mg., Disp: , Rfl:     triamterene-hydrochlorothiazide 37.5-25 mg (DYAZIDE) 37.5-25 mg per capsule, Take 1 capsule by mouth every morning., Disp: , Rfl:      SOCIAL HISTORY:   Social History     Socioeconomic History    Marital status:    Tobacco Use    Smoking status: Never    Smokeless tobacco: Former    Substance and Sexual Activity    Alcohol use: Not Currently    Drug use: Never    Sexual activity: Not Currently        FAMILY HISTORY: No family history on file.       PHYSICAL EXAM:      There were no vitals filed for this visit.  There is no height or weight on file to calculate BMI.    GENERAL: Well-developed, well-nourished male . The patient is alert, oriented and cooperative.    EXTREMITIES:   Right hip with bruising noted along posterior aspect of right buttock extending down into her right lateral thigh, nontender to palpation, good range of motion with flexion internal and external rotation, no groin pain elicited on exam today, negative straight leg raise, neurovascularly intact   Right knee with skin clean dry and intact, nontender to palpation, good range of motion from 0-100 degrees without pain, knee feels stable to varus and valgus stress testing, neurovascularly intact      RADIOGRAPHIC FINDINGS:   X-Ray Hip 2 or 3 views Right with Pelvis when performed    Result Date: 7/2/2024  EXAMINATION: XR HIP WITH PELVIS WHEN PERFORMED 2 OR 3 VIEWS RIGHT CLINICAL HISTORY: Unspecified injury of right hip, initial encounter COMPARISON: Right hip x-ray September 13, 2016 TECHNIQUE: Single frontal view of the pelvis as well as frontal and frogleg lateral views of the right hip. FINDINGS: Mild-to-moderate degenerative change of the bilateral hips and SI joints.  No acute fracture or dislocation.  Atherosclerotic calcification demonstrated.     As above. Point of Service: [facility] Electronically signed by: Aly De La Torre Date:    07/02/2024 Time:    11:53    X-Ray Knee AP LAT with Sunrise Right    Result Date: 7/2/2024  EXAMINATION: XR KNEE AP LAT WITH SUNRISE RIGHT CLINICAL HISTORY: Unspecified injury of right lower leg, initial encounter COMPARISON: Right knee x-ray September 23, 2021 TECHNIQUE: Frontal, lateral, and sunrise views of the right knee. FINDINGS: Total right knee arthroplasty, similar  to prior.  No evidence of acute fracture or dislocation or evidence of orthopedic hardware failure.     As above. Point of Service: [facility] Electronically signed by: Aly De La Torre Date:    07/02/2024 Time:    11:52       Patient Active Problem List    Diagnosis Date Noted    History of fall 08/31/2023    S/P carpal tunnel release 08/11/2023    Carpal tunnel syndrome of left wrist 04/03/2023    Anxiety state 11/09/2022    Benign prostatic hyperplasia 11/09/2022    Chronic atrial fibrillation 11/09/2022    Carbuncle and furuncle of upper arm and forearm 11/09/2022    Chronic diastolic heart failure 11/09/2022    Atherosclerosis of native coronary artery with unstable angina pectoris 11/09/2022    Diabetic neuropathy 11/09/2022    Gastroesophageal reflux disease 11/09/2022    Heart failure 11/09/2022    Iron deficiency anemia 11/09/2022    Low back pain 11/09/2022    Osteoarthritis 11/09/2022    Peripheral neuropathy 11/09/2022    Polyneuropathy due to type 2 diabetes mellitus 11/09/2022    Potassium deficiency 11/09/2022    Obstructive sleep apnea syndrome 11/09/2022    Fall 11/09/2022    Dysphagia 11/09/2022    On long term drug therapy 11/09/2022    Visual hallucinations 05/03/2022    Cervical myelopathy 04/28/2022    HTN (hypertension) 04/28/2022    DM (diabetes mellitus) 04/28/2022    HLD (hyperlipidemia) 04/28/2022    History of total knee arthroplasty, right 09/23/2021    Trigger finger of left hand 07/26/2021     IMPRESSION AND PLAN:   Fall onto right hip and knee.  Personally reviewed x-rays of right knee today showing total knee arthroplasty in place without any signs of loosening, no acute fracture or dislocation.  Also personally reviewed x-rays of right hip today showing mild-to-moderate degenerative changes of right hip and SI joint, no acute fracture or dislocation.  Discussed continuation of ice and elevation therapy.  Discussed use of Tylenol and or Voltaren gel as needed for pain.   Recommend follow up in 1 year for recheck of right knee arthroplasty with Dr. Dempsey.  Otherwise follow up p.r.n..        No follow-ups on file.       Jeimy Hernandez PA-C      (Subject to voice recognition error, transcription service not allowed)

## 2024-07-09 DIAGNOSIS — Z71.89 COMPLEX CARE COORDINATION: ICD-10-CM

## 2024-07-12 ENCOUNTER — TELEPHONE (OUTPATIENT)
Dept: SPINE | Facility: CLINIC | Age: 79
End: 2024-07-12
Payer: MEDICARE

## 2024-07-12 NOTE — TELEPHONE ENCOUNTER
"Called patient to discuss Dr. Carpenter review of EMG- Per DR. Blake " Bilateral carpal tunnel syndrome and diabetic neuropathy."     Patient may follow up with DR. Dempsey for carpal tunnel.       "

## 2024-07-18 ENCOUNTER — OFFICE VISIT (OUTPATIENT)
Dept: ORTHOPEDICS | Facility: CLINIC | Age: 79
End: 2024-07-18
Payer: MEDICARE

## 2024-07-18 ENCOUNTER — HOSPITAL ENCOUNTER (OUTPATIENT)
Dept: RADIOLOGY | Facility: HOSPITAL | Age: 79
Discharge: HOME | End: 2024-07-18
Payer: MEDICARE

## 2024-07-18 VITALS — HEIGHT: 71 IN | WEIGHT: 242 LBS | BODY MASS INDEX: 33.88 KG/M2

## 2024-07-18 DIAGNOSIS — M25.562 PAIN OF LEFT KNEE AFTER INJURY: ICD-10-CM

## 2024-07-18 DIAGNOSIS — S89.92XA LEFT KNEE INJURY, INITIAL ENCOUNTER: ICD-10-CM
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
## 2024-07-18 DIAGNOSIS — M25.562 PAIN OF LEFT KNEE AFTER INJURY: Primary | ICD-10-CM

## 2024-07-18 PROCEDURE — 73700 CT LOWER EXTREMITY W/O DYE: CPT | Mod: TC,LT

## 2024-07-18 PROCEDURE — 99999 PR PBB SHADOW E&M-EST. PATIENT-LVL V: CPT | Mod: PBBFAC,,,

## 2024-07-18 PROCEDURE — 73562 X-RAY EXAM OF KNEE 3: CPT | Mod: TC,LT

## 2024-07-18 PROCEDURE — 73562 X-RAY EXAM OF KNEE 3: CPT | Mod: 26,LT,, | Performed by: RADIOLOGY

## 2024-07-18 PROCEDURE — 99215 OFFICE O/P EST HI 40 MIN: CPT | Mod: PBBFAC,25

## 2024-07-18 NOTE — PROGRESS NOTES
CC:   Chief Complaint   Patient presents with    Left Knee - Injury          Usama Lee is a 79 y.o. male seen today for follow up of Injury of the Left Knee  The previous right TKA by Dr. Dempsey presents today with complaints of left knee pain after a fall.  Patient reports he was in his driveway moving a small kidney pull yesterday when his cane slipped out from underneath him falling onto his left side.  He landed on concrete.  Immediately after the fall he reports right knee pain swelling and bruising.  He is unable to put weight on his left lower extremity due to pain.  He presents today in a wheelchair.  No other complaints today.        PAST MEDICAL HISTORY:   Past Medical History:   Diagnosis Date    Anticoagulant long-term use     Arthritis     Cancer     skin    Diabetes     Encounter for blood transfusion     Gout     Heart disease     High cholesterol     HTN (hypertension)     Parkinson disease     Sleep apnea         PAST SURGICAL HISTORY:   Past Surgical History:   Procedure Laterality Date    APPENDECTOMY      CARDIAC SURGERY      CARPAL TUNNEL RELEASE Left 8/1/2023    Procedure: RELEASE, CARPAL TUNNEL;  Surgeon: Daniel Dempsey MD;  Location: [facility];  Service: Orthopedics;  Laterality: Left;    CERVICAL LAMINECTOMY WITH SPINAL FUSION N/A 04/28/2022    Procedure: C3-C7 Laminectomy and Fusion;  Surgeon: Charbel Blake MD;  Location: [facility] OR;  Service: Neurosurgery;  Laterality: N/A;  Neuro monitoring    FLEXOR TENDON REPAIR Left 08/19/2021    Procedure: REPAIR, TENDON, FLEXOR;  Surgeon: Daniel Dempsey MD;  Location: [facility] OR;  Service: Orthopedics;  Laterality: Left;    KNEE SURGERY      NECK SURGERY      TOTAL KNEE ARTHROPLASTY Right     TRIGGER FINGER RELEASE Left 08/19/2021    Procedure: RELEASE, TRIGGER FINGER,RING;  Surgeon: Daniel Dempsey MD;  Location: [facility] OR;  Service: Orthopedics;  Laterality: Left;         ALLERGIES:   Review of patient's allergies indicates:   Allergen Reactions    Codeine     Lortab [hydrocodone-acetaminophen] Hallucinations    Lovastatin      Other reaction(s): Muscle pain        MEDICATIONS :    Current Outpatient Medications:     acetaminophen (TYLENOL) 650 MG TbSR, Take 650 mg by mouth Daily. prn, Disp: , Rfl:     allopurinoL (ZYLOPRIM) 300 MG tablet, TAKE 1 TABLET(S) BY MOUTH DAILY, Disp: , Rfl:     ALPRAZolam (XANAX) 0.5 MG tablet, Take 0.5 mg by mouth., Disp: , Rfl:     apixaban (ELIQUIS) 5 mg Tab, 5 mg., Disp: , Rfl:     aspirin (ECOTRIN) 81 MG EC tablet, Take 1 tablet by mouth once daily., Disp: , Rfl:     atorvastatin (LIPITOR) 80 MG tablet, TAKE ONE-HALF TABLET BY MOUTH DAILY FOR CHOLESTEROL. STOP MEDICATION AND CALL PROVIDER FOR ANY UNEXPLAINED MUSCLE ACHES,PAIN OR WEAKNESS, Disp: , Rfl:     benztropine (COGENTIN) 1 MG tablet, Take 1 tablet by mouth nightly., Disp: , Rfl:     carbidopa-levodopa  mg (SINEMET)  mg per tablet, Take 1 tablet by mouth 3 (three) times daily., Disp: , Rfl:     carvediloL (COREG) 25 MG tablet, Take 25 mg by mouth., Disp: , Rfl:     carvediloL (COREG) 6.25 MG tablet, Take 6.25 mg by mouth 2 (two) times daily., Disp: , Rfl:     cyanocobalamin 500 MCG tablet, 2 tablets, Disp: , Rfl:     cyclobenzaprine (FLEXERIL) 5 MG tablet, Take 5 mg by mouth 3 (three) times daily as needed for Muscle spasms., Disp: , Rfl:     doxycycline (MONODOX) 100 MG capsule, TAKE ONE CAPSULE BY MOUTH DAILY WITH FOOD, Disp: , Rfl:     ezetimibe (ZETIA) 10 mg tablet, Take 10 mg by mouth., Disp: , Rfl:     famotidine (PEPCID) 20 MG tablet, 20 mg., Disp: , Rfl:     ferrous sulfate (FEOSOL) 325 mg (65 mg iron) Tab tablet, Take by mouth., Disp: , Rfl:     fluorouraciL (EFUDEX) 5 % cream, APPLY TWICE DAILY TO LEFT cheeck FOR SIX WEEKS, WASH off EVERY MORNING, Disp: , Rfl:     fluticasone propionate (FLONASE) 50 mcg/actuation nasal spray, INSTILL 1 SPRAY IN NOSTRIL(S) DAILY, Disp: ,  Rfl:     furosemide (LASIX) 40 MG tablet, Take 1 tablet (40 mg total) by mouth 2 (two) times daily., Disp: 60 tablet, Rfl: 11    gabapentin (NEURONTIN) 400 MG capsule, Take 400 mg by mouth., Disp: , Rfl:     glipiZIDE (GLUCOTROL) 5 MG tablet, 5 mg 2 (two) times daily with meals., Disp: , Rfl:     isosorbide mononitrate (IMDUR) 30 MG 24 hr tablet, Take 1 tablet by mouth once daily., Disp: , Rfl:     meloxicam (MOBIC) 15 MG tablet, Take 15 mg by mouth., Disp: , Rfl:     memantine (NAMENDA) 10 MG Tab, Take 1 tablet (10 mg total) by mouth once daily., Disp: 90 tablet, Rfl: 3    nicotine polacrilex 2 MG Lozg, DISSOLVE 1 LOZENGE IN MOUTH EVERY 2 HOURS AS NEEDED FOR SMOKING CESSATION, Disp: , Rfl:     nitroGLYCERIN (NITROSTAT) 0.4 MG SL tablet, DISSOLVE ONE TABLET UNDER THE TONGUE EVERY 5 MINUTES AS NEEDED Strength: 0.4 mg, Disp: 100 tablet, Rfl: 3    pantoprazole (PROTONIX) 40 MG tablet, 40 mg., Disp: , Rfl:     potassium chloride (KLOR-CON) 10 MEQ TbSR, Take 1 tablet (10 mEq total) by mouth once daily., Disp: 90 tablet, Rfl: 3    primidone (MYSOLINE) 50 MG Tab, Take 1 tablet (50 mg total) by mouth 3 (three) times daily. (Patient not taking: Reported on 10/16/2023), Disp: 270 tablet, Rfl: 3    promethazine (PHENERGAN) 25 MG tablet, Take 1 tablet (25 mg total) by mouth every 4 (four) hours., Disp: 45 tablet, Rfl: 0    sertraline (ZOLOFT) 25 MG tablet, Take 25 mg by mouth., Disp: , Rfl:     SOOLANTRA 1 % Crea, by Each Nostril route once daily., Disp: , Rfl:     tamsulosin (FLOMAX) 0.4 mg Cap, Take 1 capsule (0.4 mg total) by mouth once daily., Disp: 90 capsule, Rfl: 3    traZODone (DESYREL) 100 MG tablet, 50 mg., Disp: , Rfl:     triamterene-hydrochlorothiazide 37.5-25 mg (DYAZIDE) 37.5-25 mg per capsule, Take 1 capsule by mouth every morning., Disp: , Rfl:      SOCIAL HISTORY:   Social History     Socioeconomic History    Marital status:    Tobacco Use    Smoking status: Never    Smokeless tobacco: Former    Substance and Sexual Activity    Alcohol use: Not Currently    Drug use: Never    Sexual activity: Not Currently        FAMILY HISTORY: No family history on file.       PHYSICAL EXAM:      There were no vitals filed for this visit.  Body mass index is 33.75 kg/m².    GENERAL: Well-developed, well-nourished male . The patient is alert, oriented and cooperative.    EXTREMITIES:  Left knee with the abrasion noted anteriorly, moderate soft tissue swelling and possible joint effusion on exam, tenderness to palpation along anterior joint line, very limited range of motion lacking 10° from full extension flexion to 90° but not without pain, unable to perform special testing due to pain, neurovascularly intact      RADIOGRAPHIC FINDINGS:   X-Ray Knee AP LAT with Sunrise Left    Result Date: 7/18/2024  EXAMINATION: XR KNEE AP LAT WITH SUNRISE LEFT CLINICAL HISTORY: Pain in left knee TECHNIQUE: AP, lateral, and sunrise views of the left knee COMPARISON: 08/31/2023 FINDINGS: Tricompartmental osteoarthritic change with prominent osteophyte formation again seen.  No acute fracture or dislocation.     Moderate tricompartmental osteoarthrosis similar. Electronically signed by: Daniel Spain Date:    07/18/2024 Time:    12:12    Patient Active Problem List    Diagnosis Date Noted    History of fall 08/31/2023    S/P carpal tunnel release 08/11/2023    Carpal tunnel syndrome of left wrist 04/03/2023    Anxiety state 11/09/2022    Benign prostatic hyperplasia 11/09/2022    Chronic atrial fibrillation 11/09/2022    Carbuncle and furuncle of upper arm and forearm 11/09/2022    Chronic diastolic heart failure 11/09/2022    Atherosclerosis of native coronary artery with unstable angina pectoris 11/09/2022    Diabetic neuropathy 11/09/2022    Gastroesophageal reflux disease 11/09/2022    Heart failure 11/09/2022    Iron deficiency anemia 11/09/2022    Low back pain 11/09/2022    Osteoarthritis 11/09/2022    Peripheral neuropathy 11/09/2022     Polyneuropathy due to type 2 diabetes mellitus 11/09/2022    Potassium deficiency 11/09/2022    Obstructive sleep apnea syndrome 11/09/2022    Fall 11/09/2022    Dysphagia 11/09/2022    On long term drug therapy 11/09/2022    Visual hallucinations 05/03/2022    Cervical myelopathy 04/28/2022    HTN (hypertension) 04/28/2022    DM (diabetes mellitus) 04/28/2022    HLD (hyperlipidemia) 04/28/2022    History of total knee arthroplasty, right 09/23/2021    Trigger finger of left hand 07/26/2021     IMPRESSION AND PLAN:  Fall onto left knee.  With knee pain swelling and bruising anteriorly, concerning for possible occult fracture.  Recommending MRI for further evaluation however patient states he has a pacemaker that is non compatible with MRI.  We will order CT today.  After imaging we will call patient with results.  Recommend nonweightbearing on left lower extremity.  Patient has a walker and Rollator at home however he states that he would feel more comfortable with crutches and is requesting a pair today.  Discussed with the patient that he would likely in a wheelchair.  He reports that the hallway and doors of his house or very narrow would not support a wheelchair.  It was my recommendation that he continued using a walker and Rollator. Crutches given.  We will call him with CT results in her today and schedule him to see Dr. Dempsey.    Of note this is his 2nd fall in the month of July.  Patient lives at home alone.  Depending on CT results later today patient would likely benefit from home health and home therapy for gait training.        No follow-ups on file.       Jeimy Hernandez PA-C      (Subject to voice recognition error, transcription service not allowed)

## 2024-09-18 ENCOUNTER — OFFICE VISIT (OUTPATIENT)
Dept: FAMILY MEDICINE | Facility: CLINIC | Age: 79
End: 2024-09-18
Payer: MEDICARE

## 2024-09-18 VITALS
HEART RATE: 65 BPM | DIASTOLIC BLOOD PRESSURE: 60 MMHG | OXYGEN SATURATION: 96 % | RESPIRATION RATE: 19 BRPM | HEIGHT: 71 IN | TEMPERATURE: 98 F | SYSTOLIC BLOOD PRESSURE: 104 MMHG | BODY MASS INDEX: 34.3 KG/M2 | WEIGHT: 245 LBS

## 2024-09-18 DIAGNOSIS — E13.51 PERIPHERAL VASCULAR DISEASE DUE TO SECONDARY DIABETES: ICD-10-CM

## 2024-09-18 DIAGNOSIS — R09.81 NASAL CONGESTION: ICD-10-CM

## 2024-09-18 DIAGNOSIS — E11.42 TYPE 2 DIABETES MELLITUS WITH DIABETIC POLYNEUROPATHY, WITHOUT LONG-TERM CURRENT USE OF INSULIN: Primary | ICD-10-CM

## 2024-09-18 LAB
CREAT UR-MCNC: 88 MG/DL (ref 39–259)
MICROALBUMIN UR-MCNC: 0.5 MG/DL (ref 0–2.8)
MICROALBUMIN/CREAT RATIO PNL UR: 5.7 MG/G (ref 0–30)

## 2024-09-18 PROCEDURE — 82570 ASSAY OF URINE CREATININE: CPT | Mod: ,,, | Performed by: CLINICAL MEDICAL LABORATORY

## 2024-09-18 PROCEDURE — 82043 UR ALBUMIN QUANTITATIVE: CPT | Mod: ,,, | Performed by: CLINICAL MEDICAL LABORATORY

## 2024-09-18 PROCEDURE — 99203 OFFICE O/P NEW LOW 30 MIN: CPT | Mod: ,,, | Performed by: FAMILY MEDICINE

## 2024-09-18 NOTE — PROGRESS NOTES
Usama Lee is a 79 y.o. male seen today for his initial visit to [facility].  Patient reports no chest pain or shortness a breath but does have chronic nasal congestion and postnasal drainage despite the use of Singulair and Flonase.  He would like an ear nose and throat evaluation.  The patient is not fasting but will stop by for fasting lab work in the near future.    Past Medical History:   Diagnosis Date    Anticoagulant long-term use     Arthritis     Cancer     skin    Diabetes     Encounter for blood transfusion     Gout     Heart disease     High cholesterol     HTN (hypertension)     Parkinson disease     Sleep apnea      No family history on file.  Current Outpatient Medications on File Prior to Visit   Medication Sig Dispense Refill    acetaminophen (TYLENOL) 650 MG TbSR Take 650 mg by mouth Daily. prn      allopurinoL (ZYLOPRIM) 300 MG tablet TAKE 1 TABLET(S) BY MOUTH DAILY      apixaban (ELIQUIS) 5 mg Tab Take 5 mg by mouth 2 (two) times daily.      aspirin (ECOTRIN) 81 MG EC tablet Take 1 tablet by mouth once daily.      carvediloL (COREG) 25 MG tablet Take 25 mg by mouth.      cyanocobalamin 500 MCG tablet 2 tablets      doxycycline (MONODOX) 100 MG capsule TAKE ONE CAPSULE BY MOUTH DAILY WITH FOOD      ferrous sulfate (FEOSOL) 325 mg (65 mg iron) Tab tablet Take by mouth.      fluticasone propionate (FLONASE) 50 mcg/actuation nasal spray INSTILL 1 SPRAY IN NOSTRIL(S) DAILY      gabapentin (NEURONTIN) 400 MG capsule Take 400 mg by mouth 4 (four) times daily.      glipiZIDE (GLUCOTROL) 5 MG tablet 5 mg 2 (two) times daily with meals.      isosorbide mononitrate (IMDUR) 30 MG 24 hr tablet Take 1 tablet by mouth once daily.      memantine (NAMENDA) 10 MG Tab Take 1 tablet (10 mg total) by mouth once daily. (Patient taking differently: Take 10 mg by mouth 2 (two) times daily.) 90 tablet 3    pantoprazole (PROTONIX) 40 MG tablet 40 mg.      sertraline (ZOLOFT) 25 MG tablet Take 25 mg by mouth.       traZODone (DESYREL) 100 MG tablet 50 mg.      triamterene-hydrochlorothiazide 37.5-25 mg (DYAZIDE) 37.5-25 mg per capsule Take 1 capsule by mouth every morning.      ALPRAZolam (XANAX) 0.5 MG tablet Take 0.5 mg by mouth.      atorvastatin (LIPITOR) 80 MG tablet TAKE ONE-HALF TABLET BY MOUTH DAILY FOR CHOLESTEROL. STOP MEDICATION AND CALL PROVIDER FOR ANY UNEXPLAINED MUSCLE ACHES,PAIN OR WEAKNESS (Patient not taking: Reported on 9/18/2024)      benztropine (COGENTIN) 1 MG tablet Take 1 tablet by mouth nightly. (Patient not taking: Reported on 9/18/2024)      carbidopa-levodopa  mg (SINEMET)  mg per tablet Take 1 tablet by mouth 3 (three) times daily. (Patient not taking: Reported on 9/18/2024)      carvediloL (COREG) 6.25 MG tablet Take 6.25 mg by mouth 2 (two) times daily. (Patient not taking: Reported on 9/18/2024)      cyclobenzaprine (FLEXERIL) 5 MG tablet Take 5 mg by mouth 3 (three) times daily as needed for Muscle spasms.      ezetimibe (ZETIA) 10 mg tablet Take 10 mg by mouth. (Patient not taking: Reported on 9/18/2024)      famotidine (PEPCID) 20 MG tablet 20 mg. (Patient not taking: Reported on 9/18/2024)      fluorouraciL (EFUDEX) 5 % cream APPLY TWICE DAILY TO LEFT cheeck FOR SIX WEEKS, WASH off EVERY MORNING      furosemide (LASIX) 40 MG tablet Take 1 tablet (40 mg total) by mouth 2 (two) times daily. 60 tablet 11    meloxicam (MOBIC) 15 MG tablet Take 15 mg by mouth.      nicotine polacrilex 2 MG Lozg DISSOLVE 1 LOZENGE IN MOUTH EVERY 2 HOURS AS NEEDED FOR SMOKING CESSATION      nitroGLYCERIN (NITROSTAT) 0.4 MG SL tablet DISSOLVE ONE TABLET UNDER THE TONGUE EVERY 5 MINUTES AS NEEDED  Strength: 0.4 mg 100 tablet 3    potassium chloride (KLOR-CON) 10 MEQ TbSR Take 1 tablet (10 mEq total) by mouth once daily. 90 tablet 3    primidone (MYSOLINE) 50 MG Tab Take 1 tablet (50 mg total) by mouth 3 (three) times daily. (Patient not taking: Reported on 10/16/2023) 270 tablet 3    promethazine  (PHENERGAN) 25 MG tablet Take 1 tablet (25 mg total) by mouth every 4 (four) hours. 45 tablet 0    SOOLANTRA 1 % Crea by Each Nostril route once daily.      tamsulosin (FLOMAX) 0.4 mg Cap Take 1 capsule (0.4 mg total) by mouth once daily. 90 capsule 3     No current facility-administered medications on file prior to visit.     Immunization History   Administered Date(s) Administered    COVID-19, MRNA, LN-S, PF (MODERNA FULL 0.5 ML DOSE) 01/14/2021, 02/12/2021    Influenza 12/17/2004, 03/22/2005, 06/29/2005, 10/06/2005, 04/18/2006, 12/26/2006, 03/30/2007, 07/05/2007, 10/23/2007, 01/23/2008, 04/23/2008, 07/24/2008, 11/01/2008, 12/03/2008, 10/28/2009, 11/01/2009, 12/22/2009, 12/22/2009, 12/28/2010, 10/27/2011, 02/24/2014, 11/01/2014, 10/01/2015, 11/23/2016, 10/27/2020, 12/01/2021, 11/01/2022    Influenza - High Dose - PF (65 years and older) 10/16/2017, 10/25/2018, 11/07/2019, 10/20/2020, 10/29/2021    Pneumococcal Conjugate - 13 Valent 02/22/2016    Pneumococcal Polysaccharide - 23 Valent 10/06/2005, 07/18/2019    Td (Adult), Unspecified Formulation 01/01/2007    Zoster Recombinant 08/11/2020       Review of Systems   Constitutional:  Negative for fever, malaise/fatigue and weight loss.   HENT:  Positive for congestion.    Respiratory:  Negative for shortness of breath.    Cardiovascular:  Negative for chest pain and palpitations.   Gastrointestinal:  Negative for nausea and vomiting.   Neurological:  Positive for tremors.   Psychiatric/Behavioral:  Negative for depression.         Vitals:    09/18/24 0908   BP: 104/60   Pulse: 65   Resp: 19   Temp: 98.4 °F (36.9 °C)       Physical Exam  Vitals reviewed.   Constitutional:       Appearance: Normal appearance.   HENT:      Head: Normocephalic.      Nose:      Right Turbinates: Swollen.      Left Turbinates: Swollen.      Comments: Patient has copious clear postnasal drainage.  Eyes:      Extraocular Movements: Extraocular movements intact.      Conjunctiva/sclera:  Conjunctivae normal.      Pupils: Pupils are equal, round, and reactive to light.   Neck:      Thyroid: No thyroid mass or thyromegaly.   Cardiovascular:      Rate and Rhythm: Normal rate and regular rhythm.      Heart sounds: Normal heart sounds. No murmur heard.     No gallop.   Pulmonary:      Effort: Pulmonary effort is normal. No respiratory distress.      Breath sounds: Normal breath sounds. Decreased air movement present. No wheezing or rales.      Comments: Breath sounds are coarse throughout.  Skin:     General: Skin is warm and dry.      Coloration: Skin is not jaundiced or pale.   Neurological:      Mental Status: He is alert.   Psychiatric:         Mood and Affect: Mood normal.         Behavior: Behavior normal.         Thought Content: Thought content normal.         Judgment: Judgment normal.          Assessment and Plan  1. Type 2 diabetes mellitus with diabetic polyneuropathy, without long-term current use of insulin  -     Microalbumin/Creatinine Ratio, Urine    2. Peripheral vascular disease due to secondary diabetes  Assessment & Plan:  We will continue to monitor A1c and lipid panel      3. Nasal congestion  -     Ambulatory referral/consult to ENT; Future; Expected date: 09/25/2024             Return to clinic in 6 months or as needed once his lab work is in.    Health Maintenance Topics with due status: Not Due       Topic Last Completion Date    Lipid Panel 12/01/2023

## 2024-09-20 ENCOUNTER — TELEPHONE (OUTPATIENT)
Dept: FAMILY MEDICINE | Facility: CLINIC | Age: 79
End: 2024-09-20
Payer: MEDICARE

## 2024-09-20 NOTE — TELEPHONE ENCOUNTER
----- Message from Charbel Jordan MD sent at 9/19/2024  7:51 AM CDT -----  Urine protein test is normal

## 2024-10-07 ENCOUNTER — OFFICE VISIT (OUTPATIENT)
Dept: OTOLARYNGOLOGY | Facility: CLINIC | Age: 79
End: 2024-10-07
Payer: MEDICARE

## 2024-10-07 VITALS — BODY MASS INDEX: 33.88 KG/M2 | WEIGHT: 242 LBS | HEIGHT: 71 IN

## 2024-10-07 DIAGNOSIS — J32.9 CHRONIC SINUSITIS, UNSPECIFIED LOCATION: Primary | ICD-10-CM

## 2024-10-07 DIAGNOSIS — R09.81 NASAL CONGESTION: ICD-10-CM

## 2024-10-07 PROCEDURE — 99213 OFFICE O/P EST LOW 20 MIN: CPT | Mod: PBBFAC | Performed by: OTOLARYNGOLOGY

## 2024-10-07 PROCEDURE — 99204 OFFICE O/P NEW MOD 45 MIN: CPT | Mod: S$PBB,,, | Performed by: OTOLARYNGOLOGY

## 2024-10-07 PROCEDURE — 99999 PR PBB SHADOW E&M-EST. PATIENT-LVL III: CPT | Mod: PBBFAC,,, | Performed by: OTOLARYNGOLOGY

## 2024-10-07 NOTE — PROGRESS NOTES
Subjective:       Patient ID: Usama Lee is a 79 y.o. male.    Chief Complaint: Sinusitis (Patient complains of having sinus congestion. He is nasal spray as directed with no relief.)    Sinusitis  Associated symptoms include congestion and sinus pressure.     Review of Systems   HENT:  Positive for congestion, rhinorrhea, sinus pressure and sinus pain.    All other systems reviewed and are negative.      Objective:      Physical Exam  General: NAD  Head: Normocephalic, atraumatic, no facial asymmetry/normal strength,  Ears: Both auricules normal in appearance, w/o deformities tympanic membranes normal external auditory canals normal  Nose: External nose w/o deformities congested turbinates no drainage or inflammation  Oral Cavity: Lips, gums, floor of mouth, tongue hard palate, and buccal mucosa without mass/lesion  Oropharynx: Mucosa pink and moist, soft palate, posterior pharynx and oropharyngeal wall without mass/lesion  Neck: Supple, symmetric, trachea midline, no palpable mass/lesion, no palpable cervical lymphadenopathy  Skin: Warm and dry, no concerning lesions  Respiratory: Respirations even, unlabored  Assessment:       1. Chronic sinusitis, unspecified location    2. Nasal congestion        Plan:       CT sinus to evaluate no help from antibiotics nose sprays over past few months

## 2024-10-10 ENCOUNTER — OFFICE VISIT (OUTPATIENT)
Dept: OTOLARYNGOLOGY | Facility: CLINIC | Age: 79
End: 2024-10-10
Payer: MEDICARE

## 2024-10-10 ENCOUNTER — HOSPITAL ENCOUNTER (OUTPATIENT)
Dept: RADIOLOGY | Facility: HOSPITAL | Age: 79
Discharge: HOME | End: 2024-10-10
Attending: OTOLARYNGOLOGY
Payer: MEDICARE

## 2024-10-10 VITALS — WEIGHT: 242 LBS | HEIGHT: 71 IN | BODY MASS INDEX: 33.88 KG/M2

## 2024-10-10 DIAGNOSIS — J32.9 CHRONIC SINUSITIS, UNSPECIFIED LOCATION: ICD-10-CM

## 2024-10-10 DIAGNOSIS — K21.9 GASTROESOPHAGEAL REFLUX DISEASE, UNSPECIFIED WHETHER ESOPHAGITIS PRESENT: Primary | ICD-10-CM

## 2024-10-10 DIAGNOSIS — R09.81 NASAL CONGESTION: ICD-10-CM

## 2024-10-10 PROCEDURE — 99214 OFFICE O/P EST MOD 30 MIN: CPT | Mod: S$PBB,,, | Performed by: OTOLARYNGOLOGY

## 2024-10-10 PROCEDURE — 99999 PR PBB SHADOW E&M-EST. PATIENT-LVL III: CPT | Mod: PBBFAC,,, | Performed by: OTOLARYNGOLOGY

## 2024-10-10 PROCEDURE — 70486 CT MAXILLOFACIAL W/O DYE: CPT | Mod: TC

## 2024-10-10 PROCEDURE — 99213 OFFICE O/P EST LOW 20 MIN: CPT | Mod: PBBFAC,25 | Performed by: OTOLARYNGOLOGY

## 2024-10-10 NOTE — PROGRESS NOTES
Subjective:       Patient ID: Usama Lee is a 79 y.o. male.    Chief Complaint: Follow-up (Patient following up for CT of sinus results.)    HPI  Review of Systems    Objective:      Physical Exam    Assessment:       No diagnosis found.    Plan:       ***

## 2024-10-10 NOTE — PROGRESS NOTES
Subjective:       Patient ID: Usama Lee is a 79 y.o. male.    Chief Complaint: Follow-up (Patient following up for CT of sinus results.)    Follow-up      Review of Systems   HENT:  Positive for postnasal drip.    All other systems reviewed and are negative.      Objective:      Physical Exam  General: NAD  Head: Normocephalic, atraumatic, no facial asymmetry/normal strength,  Ears: Both auricules normal in appearance, w/o deformities tympanic membranes normal external auditory canals normal  Nose: External nose w/o deformities normal turbinates no drainage or inflammation  Oral Cavity: Lips, gums, floor of mouth, tongue hard palate, and buccal mucosa without mass/lesion  Oropharynx: Mucosa pink and moist, soft palate, posterior pharynx and oropharyngeal wall without mass/lesion  Neck: Supple, symmetric, trachea midline, no palpable mass/lesion, no palpable cervical lymphadenopathy  Skin: Warm and dry, no concerning lesions  Respiratory: Respirations even, unlabored  Assessment:       1. Gastroesophageal reflux disease, unspecified whether esophagitis present    2. Chronic sinusitis, unspecified location    3. Nasal congestion        Plan:       Refer to GI sinus CT reviewed and discussed   Normal sinuses  Continue nasal spray zyrtec

## 2024-11-12 DIAGNOSIS — M25.562 LEFT KNEE PAIN, UNSPECIFIED CHRONICITY: Primary | ICD-10-CM

## 2024-11-13 ENCOUNTER — OFFICE VISIT (OUTPATIENT)
Dept: ORTHOPEDICS | Facility: CLINIC | Age: 79
End: 2024-11-13
Payer: MEDICARE

## 2024-11-13 ENCOUNTER — HOSPITAL ENCOUNTER (OUTPATIENT)
Dept: RADIOLOGY | Facility: HOSPITAL | Age: 79
Discharge: HOME | End: 2024-11-13
Attending: ORTHOPAEDIC SURGERY
Payer: MEDICARE

## 2024-11-13 DIAGNOSIS — M17.12 PRIMARY OSTEOARTHRITIS OF LEFT KNEE: Primary | ICD-10-CM

## 2024-11-13 DIAGNOSIS — M25.562 LEFT KNEE PAIN, UNSPECIFIED CHRONICITY: ICD-10-CM

## 2024-11-13 DIAGNOSIS — G56.01 CARPAL TUNNEL SYNDROME OF RIGHT WRIST: ICD-10-CM

## 2024-11-13 PROCEDURE — 73562 X-RAY EXAM OF KNEE 3: CPT | Mod: 26,LT,, | Performed by: ORTHOPAEDIC SURGERY

## 2024-11-13 PROCEDURE — 73562 X-RAY EXAM OF KNEE 3: CPT | Mod: TC,LT

## 2024-11-13 PROCEDURE — 99999PBSHW PR PBB SHADOW TECHNICAL ONLY FILED TO HB: Mod: PBBFAC,,,

## 2024-11-13 PROCEDURE — 20610 DRAIN/INJ JOINT/BURSA W/O US: CPT | Mod: PBBFAC | Performed by: ORTHOPAEDIC SURGERY

## 2024-11-13 PROCEDURE — 99999 PR PBB SHADOW E&M-EST. PATIENT-LVL II: CPT | Mod: PBBFAC,,, | Performed by: ORTHOPAEDIC SURGERY

## 2024-11-13 PROCEDURE — 99212 OFFICE O/P EST SF 10 MIN: CPT | Mod: PBBFAC,25 | Performed by: ORTHOPAEDIC SURGERY

## 2024-11-13 RX ORDER — TRIAMCINOLONE ACETONIDE 40 MG/ML
40 INJECTION, SUSPENSION INTRA-ARTICULAR; INTRAMUSCULAR
Status: COMPLETED | OUTPATIENT
Start: 2024-11-13 | End: 2024-11-13

## 2024-11-13 RX ORDER — BUPIVACAINE HYDROCHLORIDE 5 MG/ML
1 INJECTION, SOLUTION PERINEURAL
Status: COMPLETED | OUTPATIENT
Start: 2024-11-13 | End: 2024-11-13

## 2024-11-13 RX ADMIN — TRIAMCINOLONE ACETONIDE 40 MG: 40 INJECTION, SUSPENSION INTRA-ARTICULAR; INTRAMUSCULAR at 03:11

## 2024-11-13 RX ADMIN — BUPIVACAINE HYDROCHLORIDE 5 MG: 5 INJECTION, SOLUTION PERINEURAL at 03:11

## 2024-11-13 NOTE — PROGRESS NOTES
CLINIC NOTE       Chief Complaint   Patient presents with    Left Knee - Pain        Usama Lee is a 79 y.o. male seen today for evaluation of left knee and right hand.  He was known to me having undergone right TKR 10-12 years ago and left carpal tunnel surgical release in the past.  He had evidence of right carpal tunnel syndrome but he was not followed up.  Over the past 2-3 months however he was developed persistent decreased sensation in the median nerve distribution of the right hand.  In addition he has had slowly progressive pain in his left knee exacerbated by a fall 07/18/2024.  He landed on the anterior aspect of his knee and suffered a superficial abrasion..  He continues do well with his right TKR.  He was had a problem with lower extremity venous insufficiency.  He had surgery on both legs performed by Dr. Kyree Moody (cardiology) with slight improvement on the right and minimal improvement on the left.  He ambulates with a wheeled walker.  Uses Mobic in his not on any blood thinners.      Past Medical History:   Diagnosis Date    Anticoagulant long-term use     Arthritis     Cancer     skin    Diabetes     Encounter for blood transfusion     Gout     Heart disease     High cholesterol     HTN (hypertension)     Parkinson disease     Sleep apnea      No family history on file.  Current Outpatient Medications on File Prior to Visit   Medication Sig Dispense Refill    acetaminophen (TYLENOL) 650 MG TbSR Take 650 mg by mouth Daily. prn      allopurinoL (ZYLOPRIM) 300 MG tablet TAKE 1 TABLET(S) BY MOUTH DAILY      ALPRAZolam (XANAX) 0.5 MG tablet Take 0.5 mg by mouth.      apixaban (ELIQUIS) 5 mg Tab Take 5 mg by mouth 2 (two) times daily.      aspirin (ECOTRIN) 81 MG EC tablet Take 1 tablet by mouth once daily.      carvediloL (COREG) 25 MG tablet Take 25 mg by mouth.      cyanocobalamin 500 MCG tablet 2 tablets      cyclobenzaprine (FLEXERIL) 5 MG tablet Take 5 mg by mouth 3 (three) times  daily as needed for Muscle spasms.      doxycycline (MONODOX) 100 MG capsule TAKE ONE CAPSULE BY MOUTH DAILY WITH FOOD      ferrous sulfate (FEOSOL) 325 mg (65 mg iron) Tab tablet Take by mouth.      fluorouraciL (EFUDEX) 5 % cream APPLY TWICE DAILY TO LEFT cheeck FOR SIX WEEKS, WASH off EVERY MORNING      fluticasone propionate (FLONASE) 50 mcg/actuation nasal spray INSTILL 1 SPRAY IN NOSTRIL(S) DAILY      furosemide (LASIX) 40 MG tablet Take 1 tablet (40 mg total) by mouth 2 (two) times daily. 60 tablet 11    gabapentin (NEURONTIN) 400 MG capsule Take 400 mg by mouth 4 (four) times daily.      glipiZIDE (GLUCOTROL) 5 MG tablet 5 mg 2 (two) times daily with meals.      isosorbide mononitrate (IMDUR) 30 MG 24 hr tablet Take 1 tablet by mouth once daily.      meloxicam (MOBIC) 15 MG tablet Take 15 mg by mouth.      memantine (NAMENDA) 10 MG Tab Take 1 tablet (10 mg total) by mouth once daily. (Patient taking differently: Take 10 mg by mouth 2 (two) times daily.) 90 tablet 3    nicotine polacrilex 2 MG Lozg DISSOLVE 1 LOZENGE IN MOUTH EVERY 2 HOURS AS NEEDED FOR SMOKING CESSATION      nitroGLYCERIN (NITROSTAT) 0.4 MG SL tablet DISSOLVE ONE TABLET UNDER THE TONGUE EVERY 5 MINUTES AS NEEDED  Strength: 0.4 mg 100 tablet 3    pantoprazole (PROTONIX) 40 MG tablet 40 mg.      potassium chloride (KLOR-CON) 10 MEQ TbSR Take 1 tablet (10 mEq total) by mouth once daily. 90 tablet 3    primidone (MYSOLINE) 50 MG Tab Take 1 tablet (50 mg total) by mouth 3 (three) times daily. (Patient not taking: Reported on 10/16/2023) 270 tablet 3    promethazine (PHENERGAN) 25 MG tablet Take 1 tablet (25 mg total) by mouth every 4 (four) hours. 45 tablet 0    sertraline (ZOLOFT) 25 MG tablet Take 25 mg by mouth.      SOOLANTRA 1 % Crea by Each Nostril route once daily.      tamsulosin (FLOMAX) 0.4 mg Cap Take 1 capsule (0.4 mg total) by mouth once daily. 90 capsule 3    traZODone (DESYREL) 100 MG tablet 50 mg.       triamterene-hydrochlorothiazide 37.5-25 mg (DYAZIDE) 37.5-25 mg per capsule Take 1 capsule by mouth every morning.       No current facility-administered medications on file prior to visit.       ROS     There were no vitals filed for this visit.    Past Surgical History:   Procedure Laterality Date    APPENDECTOMY      CARDIAC SURGERY      CARPAL TUNNEL RELEASE Left 8/1/2023    Procedure: RELEASE, CARPAL TUNNEL;  Surgeon: Daniel Dempsey MD;  Location: [facility] OR;  Service: Orthopedics;  Laterality: Left;    CERVICAL LAMINECTOMY WITH SPINAL FUSION N/A 04/28/2022    Procedure: C3-C7 Laminectomy and Fusion;  Surgeon: Charbel Blake MD;  Location: [facility] OR;  Service: Neurosurgery;  Laterality: N/A;  Neuro monitoring    FLEXOR TENDON REPAIR Left 08/19/2021    Procedure: REPAIR, TENDON, FLEXOR;  Surgeon: Daniel Dempsey MD;  Location: [facility] OR;  Service: Orthopedics;  Laterality: Left;    KNEE SURGERY      NECK SURGERY      TOTAL KNEE ARTHROPLASTY Right     TRIGGER FINGER RELEASE Left 08/19/2021    Procedure: RELEASE, TRIGGER FINGER,RING;  Surgeon: Daniel Dempsey MD;  Location: [facility] OR;  Service: Orthopedics;  Laterality: Left;        Review of patient's allergies indicates:   Allergen Reactions    Codeine     Lortab [hydrocodone-acetaminophen] Hallucinations    Lovastatin      Other reaction(s): Muscle pain        Ortho Exam :  Well-developed well-nourished  male no acute distress.  Alert oriented cooperative.  Neck is supple without JVD.  Breathing is regular nonlabored.  Skin is warm dry no lesions seen.  Exam left knee shows 1+ intra-articular effusion.  He was she was significant pretibial edema left greater than right.  No skin break town.  Left knee motion is 0-120 degrees of flexion.  Knee ligaments stable clinically.  Exam of the right hand shows normal contour.  He was he was insensate in the median nerve distribution.    Radiographic Examination:  Left  knee 11/13/2024    Technique:  Three views AP lateral and patellar    Findings:  Bones are adequately mineralized.  He was evidence of moderately severe tricompartmental DJD.    Impression:   See Above    Assessment and Plan  Patient Active Problem List    Diagnosis Date Noted    Peripheral vascular disease due to secondary diabetes 09/18/2024    History of fall 08/31/2023    S/P carpal tunnel release 08/11/2023    Carpal tunnel syndrome of left wrist 04/03/2023    Anxiety state 11/09/2022    Benign prostatic hyperplasia 11/09/2022    Chronic atrial fibrillation 11/09/2022    Carbuncle and furuncle of upper arm and forearm 11/09/2022    Chronic diastolic heart failure 11/09/2022    Atherosclerosis of native coronary artery with unstable angina pectoris 11/09/2022    Diabetic neuropathy 11/09/2022    Gastroesophageal reflux disease 11/09/2022    Heart failure 11/09/2022    Iron deficiency anemia 11/09/2022    Low back pain 11/09/2022    Osteoarthritis 11/09/2022    Peripheral neuropathy 11/09/2022    Polyneuropathy due to type 2 diabetes mellitus 11/09/2022    Potassium deficiency 11/09/2022    Obstructive sleep apnea syndrome 11/09/2022    Fall 11/09/2022    Dysphagia 11/09/2022    On long term drug therapy 11/09/2022    Visual hallucinations 05/03/2022    Cervical myelopathy 04/28/2022    HTN (hypertension) 04/28/2022    DM (diabetes mellitus) 04/28/2022    HLD (hyperlipidemia) 04/28/2022    History of total knee arthroplasty, right 09/23/2021    Trigger finger of left hand 07/26/2021    Impression 1. DJD-left knee 2.  Chronic venous insufficiency-both lower extremities 3. Severe right carpal tunnel syndrome   Plan:  1. Left knee was prepped with Betadine intra-articular steroid injection performed with triamcinolone and Marcaine 1 cc each.  He was given contact for ALEJANDRO Magallanes (vascular surgeon) in [place] where he may get assistance with he was venous insufficiency problem.  We discussed the option for repeat steroid  injection left knee 4 months or greater.  He was ultimately likely facing TKR but needs to address the venous insufficiency problem beforehand if possible.  He was offered right carpal tunnel surgical release.  He was going to look at his schedule and he was giving some consideration to hunting.  He will call for scheduling.  Discussed outpatient beer block anesthesia    Daniel Dempsey M.D.

## 2024-11-14 ENCOUNTER — TELEPHONE (OUTPATIENT)
Dept: ORTHOPEDICS | Facility: CLINIC | Age: 79
End: 2024-11-14
Payer: MEDICARE

## 2024-11-14 DIAGNOSIS — M17.12 PRIMARY OSTEOARTHRITIS OF LEFT KNEE: Primary | ICD-10-CM

## 2024-11-14 NOTE — TELEPHONE ENCOUNTER
----- Message from Cristy sent at 11/14/2024 10:18 AM CST -----  Pts son Cordell calling. Dad needs a referral to Dr Magallanes in [facility] surgeon. That office wont make appt until that's recvd. Son needs this done soon due to situation with dads legs. Please call Cordell when ref sent so he can call that office to make appt. [phone].    Who Called: Cordell for Usama Lee    Does the patient already have the specialty appointment scheduled?:No  If yes, what is the date of that appointment?:  Referral to What Specialty:[facility] Surgeon  Reason for Referral:  Does the patient want the referral with a specific physician?:  If yes, which provider?:   Is the specialist an [facility]?:      Patient's Preferred Phone Number on File: [phone]   Best Call Back Number, if different:  Additional Information:

## 2024-12-17 PROCEDURE — 88305 TISSUE EXAM BY PATHOLOGIST: CPT | Mod: 26,,, | Performed by: PATHOLOGY

## 2024-12-17 PROCEDURE — 88305 TISSUE EXAM BY PATHOLOGIST: CPT | Mod: TC,SUR | Performed by: DERMATOLOGY

## 2024-12-18 ENCOUNTER — LAB REQUISITION (OUTPATIENT)
Dept: LAB | Facility: HOSPITAL | Age: 79
End: 2024-12-18
Attending: DERMATOLOGY
Payer: MEDICARE

## 2024-12-18 DIAGNOSIS — D49.2 NEOPLASM OF UNSPECIFIED BEHAVIOR OF BONE, SOFT TISSUE, AND SKIN: ICD-10-CM

## 2024-12-19 LAB
ESTROGEN SERPL-MCNC: NORMAL PG/ML
INSULIN SERPL-ACNC: NORMAL U[IU]/ML
LAB AP GROSS DESCRIPTION: NORMAL
LAB AP LABORATORY NOTES: NORMAL
LAB AP SPEC A DDX: NORMAL
LAB AP SPEC A MORPHOLOGY: NORMAL
LAB AP SPEC A PROCEDURE: NORMAL
LAB AP SPEC B DDX: NORMAL
LAB AP SPEC B MORPHOLOGY: NORMAL
LAB AP SPEC B PROCEDURE: NORMAL
LAB AP SPEC C DDX: NORMAL
LAB AP SPEC C MORPHOLOGY: NORMAL
LAB AP SPEC C PROCEDURE: NORMAL
T3RU NFR SERPL: NORMAL %

## 2025-01-31 ENCOUNTER — TELEPHONE (OUTPATIENT)
Dept: ORTHOPEDICS | Facility: CLINIC | Age: 80
End: 2025-01-31
Payer: MEDICARE

## 2025-01-31 DIAGNOSIS — G56.01 CARPAL TUNNEL SYNDROME OF RIGHT WRIST: Primary | ICD-10-CM

## 2025-01-31 NOTE — TELEPHONE ENCOUNTER
Talked with patient and discussed setting up Right carpal tunnel surgery for 2/13/25. Patient agreed to date. Discuss surgery instructions and to hold eliquis 3 days prior to surgery and glipizide 2 days prior to surgery. Patient verbalized understanding and was thankful.

## 2025-01-31 NOTE — TELEPHONE ENCOUNTER
----- Message from Iqra sent at 1/31/2025 10:51 AM CST -----  Regarding: talk to a nurse  Who Called: Usama Lee    Caller is requesting assistance/information from provider's office.    Symptoms (please be specific): needs to talk to a nurse about surgery        Preferred Method of Contact: Phone Call  Patient's Preferred Phone Number on File: [phone]  Best Call Back Number, if different:  Additional Information:

## 2025-02-04 DIAGNOSIS — I10 ESSENTIAL HYPERTENSION: ICD-10-CM

## 2025-02-04 RX ORDER — FUROSEMIDE 40 MG/1
40 TABLET ORAL 2 TIMES DAILY
Qty: 60 TABLET | Refills: 1 | Status: SHIPPED | OUTPATIENT
Start: 2025-02-04 | End: 2026-02-04

## 2025-02-04 NOTE — TELEPHONE ENCOUNTER
----- Message from Prerna sent at 2/3/2025  8:54 AM CST -----  Regarding: Med Refill Request  Pt switched to seeing Dr Jordan for primary care back in September. Pt has a prescription originally written by Dr Clayton Padilla for furosemide (LASIX) 40 MG tablet. Pt asks if Dr Jordan can refill this medication.     Pharmacy: [facility]  Pt phone :# [phone]

## 2025-02-05 ENCOUNTER — TELEPHONE (OUTPATIENT)
Dept: FAMILY MEDICINE | Facility: CLINIC | Age: 80
End: 2025-02-05
Payer: MEDICARE

## 2025-02-05 NOTE — TELEPHONE ENCOUNTER
----- Message from Charbel Jordan MD sent at 2/4/2025  7:57 AM CST -----  Good renal and liver function with good A1c and but office visit for LDL cholesterol

## 2025-02-05 NOTE — TELEPHONE ENCOUNTER
"Notified patient's son, Cordell, of results. Son verbalized understanding. Scheduled pt for f/u with Dr Jordan on 02/25/25 at 1100 for lab review. Son also stated the provider needed to be aware of the pt's "drinking habits" states he drinks anywhere from 8-12 a day and that he thinks it would be beneficial for the pt if the provider were to inform him to "cut back". I verbalized understanding.  "

## 2025-02-07 ENCOUNTER — TELEPHONE (OUTPATIENT)
Dept: ORTHOPEDICS | Facility: CLINIC | Age: 80
End: 2025-02-07
Payer: MEDICARE

## 2025-02-07 NOTE — TELEPHONE ENCOUNTER
----- Message from Tech Laturina sent at 2/7/2025  8:09 AM CST -----  Who Called: Usama Lee    Caller is requesting assistance/information from provider's office.          Preferred Method of Contact: Phone Call  Patient's Preferred Phone Number on File: [phone]   Best Call Back Number, if different:[phone]  Additional Information: Patient said that he has a bad place on his hip where he fell after he got a surgery date and he is on blood thinners he suppose to stop taking that medication Monday. Patient wanted to know if he needed someone to look at his hip before he stops the blood thinner, and if he needed an  X-RAY.

## 2025-02-07 NOTE — TELEPHONE ENCOUNTER
Talked with patient. Patient states he fell getting off his  and is worried about his hip. Patient wants to get it checked out before he has his carpal tunnel surgery to make sure it is still ok for him to come off his blood thinner for surgery. Let patient know it sounds like he is just bruised up probably because he is taking the blood thinner, but I could get him in to be seen by Princess Khan NP to just make sure everything is ok. Patient verbalized understanding and agreed with appointment on Monday 2/10 @ 1120am with Princess Khan NP.

## 2025-02-10 ENCOUNTER — OFFICE VISIT (OUTPATIENT)
Dept: ORTHOPEDICS | Facility: CLINIC | Age: 80
End: 2025-02-10
Payer: MEDICARE

## 2025-02-10 ENCOUNTER — HOSPITAL ENCOUNTER (OUTPATIENT)
Dept: RADIOLOGY | Facility: HOSPITAL | Age: 80
Discharge: HOME | End: 2025-02-10
Attending: NURSE PRACTITIONER
Payer: MEDICARE

## 2025-02-10 VITALS
HEART RATE: 81 BPM | WEIGHT: 246.38 LBS | OXYGEN SATURATION: 95 % | BODY MASS INDEX: 34.49 KG/M2 | HEIGHT: 71 IN | DIASTOLIC BLOOD PRESSURE: 87 MMHG | SYSTOLIC BLOOD PRESSURE: 141 MMHG

## 2025-02-10 DIAGNOSIS — M25.551 RIGHT HIP PAIN: ICD-10-CM

## 2025-02-10 DIAGNOSIS — M25.552 LEFT HIP PAIN: ICD-10-CM

## 2025-02-10 DIAGNOSIS — M25.552 PAIN OF LEFT HIP: ICD-10-CM

## 2025-02-10 DIAGNOSIS — M25.551 RIGHT HIP PAIN: Primary | ICD-10-CM

## 2025-02-10 PROCEDURE — 73502 X-RAY EXAM HIP UNI 2-3 VIEWS: CPT | Mod: 26,LT,, | Performed by: RADIOLOGY

## 2025-02-10 PROCEDURE — 99999 PR PBB SHADOW E&M-EST. PATIENT-LVL III: CPT | Mod: PBBFAC,,, | Performed by: NURSE PRACTITIONER

## 2025-02-10 PROCEDURE — 99213 OFFICE O/P EST LOW 20 MIN: CPT | Mod: S$PBB,,, | Performed by: NURSE PRACTITIONER

## 2025-02-10 PROCEDURE — 99213 OFFICE O/P EST LOW 20 MIN: CPT | Mod: PBBFAC,25 | Performed by: NURSE PRACTITIONER

## 2025-02-10 PROCEDURE — 73502 X-RAY EXAM HIP UNI 2-3 VIEWS: CPT | Mod: TC,LT

## 2025-02-10 NOTE — PROGRESS NOTES
ASSESSMENT:      ICD-10-CM ICD-9-CM   1. Right hip pain  M25.551 719.45       PLAN:     -Findings and treatment options were discussed with the patient  -All questions answered  Natural history and expected course discussed. Questions answered.  Educational materials distributed.  Scheduled follow up with Dr. Premier vitale.  Patient is scheduled for carpal tunnel release  There are no Patient Instructions on file for this visit.    IMAGING:  No results found.   EXAMINATION:  XR HIP WITH PELVIS, 3 VIEWS, LEFT     CLINICAL HISTORY:  Left hip pain.     TECHNIQUE:  AP view of pelvis and AP, frog leg lateral views of left hip obtained.     COMPARISON:  Pelvic radiograph 07/02/2024.     Impression:     1.  No acute osseous abnormality.     2.  Mild osteoarthritis of left hip:     *Mild joint narrowing, articular surface degenerative change with subchondral sclerosis-small subchondral cysts, small marginal osteophytes and small periarticular soft tissue calcifications at lateral side of hip.  3.  Similar-appearing osteoarthritis at right hip, symphysis pubis and SI joints.     4.  Degenerative changes at included lower lumbar spine and lower lumbar laminectomy changes.     5.  Several pelvic phleboliths.        Electronically signed by:Tim Potter  Date:                                            02/10/2025  Time:                                           13:20        Exam Ended: 02/10/25 11:23 CST Last Resulted: 02/10/25 13:20 CST         CC: Hip pain  79 y.o. Male who presents as a new patient to me for evaluation of left hip pain.    Pain has been present for about 3 weeks.  Mechanism of injury: Patient fell off his lawnmower.  Complains of mostly just soreness in the lateral side of if his his hip.  States his biggest concern is the amount of bruising he has on his hip and buttock.  Patient is on the blood thinner Eliquis.  Location of the pain: lateral  Occupation:   Mechanical symptoms, such as catching and popping of  the hip are not present.    Symptoms are worsened with activity.  Better with rest. Treatment thus far has included rest, activity modifications, and oral medications.    he has not  had formal physical therapy  he has not had previous advanced imaging such as MRI.   he has not  had previous hip injections.   he has had previous hip or back surgery.   Here today to discuss diagnosis and treatment options.        Review of Systems  All other review of symptoms were reviewed and found to be noncontributory.     PAST MEDICAL HISTORY:   Past Medical History:   Diagnosis Date    Anticoagulant long-term use     Arthritis     Cancer     skin    Diabetes     Encounter for blood transfusion     Gout     Heart disease     High cholesterol     HTN (hypertension)     Parkinson disease     Sleep apnea        PAST SURGICAL HISTORY:   Past Surgical History:   Procedure Laterality Date    APPENDECTOMY      CARDIAC SURGERY      CARPAL TUNNEL RELEASE Left 8/1/2023    Procedure: RELEASE, CARPAL TUNNEL;  Surgeon: Daniel Dempsey MD;  Location: [facility] OR;  Service: Orthopedics;  Laterality: Left;    CERVICAL LAMINECTOMY WITH SPINAL FUSION N/A 04/28/2022    Procedure: C3-C7 Laminectomy and Fusion;  Surgeon: Charbel Blake MD;  Location: [facility] OR;  Service: Neurosurgery;  Laterality: N/A;  Neuro monitoring    FLEXOR TENDON REPAIR Left 08/19/2021    Procedure: REPAIR, TENDON, FLEXOR;  Surgeon: Daniel Dempsey MD;  Location: [facility] OR;  Service: Orthopedics;  Laterality: Left;    KNEE SURGERY      NECK SURGERY      TOTAL KNEE ARTHROPLASTY Right     TRIGGER FINGER RELEASE Left 08/19/2021    Procedure: RELEASE, TRIGGER FINGER,RING;  Surgeon: Daniel Dempsey MD;  Location: [facility];  Service: Orthopedics;  Laterality: Left;       FAMILY HISTORY:   No family history on file.    SOCIAL HISTORY:   Social History     Socioeconomic History    Marital status:    Tobacco Use    Smoking status:  Never    Smokeless tobacco: Former   Substance and Sexual Activity    Alcohol use: Not Currently    Drug use: Never    Sexual activity: Not Currently       MEDICATIONS:     Current Outpatient Medications:     acetaminophen (TYLENOL) 650 MG TbSR, Take 650 mg by mouth Daily. prn, Disp: , Rfl:     allopurinoL (ZYLOPRIM) 300 MG tablet, TAKE 1 TABLET(S) BY MOUTH DAILY, Disp: , Rfl:     ALPRAZolam (XANAX) 0.5 MG tablet, Take 0.5 mg by mouth., Disp: , Rfl:     apixaban (ELIQUIS) 5 mg Tab, Take 5 mg by mouth 2 (two) times daily., Disp: , Rfl:     aspirin (ECOTRIN) 81 MG EC tablet, Take 1 tablet by mouth once daily., Disp: , Rfl:     carvediloL (COREG) 25 MG tablet, Take 25 mg by mouth., Disp: , Rfl:     cyanocobalamin 500 MCG tablet, 2 tablets, Disp: , Rfl:     cyclobenzaprine (FLEXERIL) 5 MG tablet, Take 5 mg by mouth 3 (three) times daily as needed for Muscle spasms., Disp: , Rfl:     doxycycline (MONODOX) 100 MG capsule, TAKE ONE CAPSULE BY MOUTH DAILY WITH FOOD, Disp: , Rfl:     ferrous sulfate (FEOSOL) 325 mg (65 mg iron) Tab tablet, Take by mouth., Disp: , Rfl:     fluorouraciL (EFUDEX) 5 % cream, APPLY TWICE DAILY TO LEFT cheeck FOR SIX WEEKS, WASH off EVERY MORNING, Disp: , Rfl:     fluticasone propionate (FLONASE) 50 mcg/actuation nasal spray, INSTILL 1 SPRAY IN NOSTRIL(S) DAILY, Disp: , Rfl:     furosemide (LASIX) 40 MG tablet, Take 1 tablet (40 mg total) by mouth 2 (two) times daily., Disp: 60 tablet, Rfl: 1    gabapentin (NEURONTIN) 400 MG capsule, Take 400 mg by mouth 4 (four) times daily., Disp: , Rfl:     glipiZIDE (GLUCOTROL) 5 MG tablet, 5 mg 2 (two) times daily with meals., Disp: , Rfl:     isosorbide mononitrate (IMDUR) 30 MG 24 hr tablet, Take 1 tablet by mouth once daily., Disp: , Rfl:     meloxicam (MOBIC) 15 MG tablet, Take 15 mg by mouth., Disp: , Rfl:     memantine (NAMENDA) 10 MG Tab, Take 1 tablet (10 mg total) by mouth once daily. (Patient taking differently: Take 10 mg by mouth 2 (two) times  "daily.), Disp: 90 tablet, Rfl: 3    nicotine polacrilex 2 MG Lozg, DISSOLVE 1 LOZENGE IN MOUTH EVERY 2 HOURS AS NEEDED FOR SMOKING CESSATION, Disp: , Rfl:     nitroGLYCERIN (NITROSTAT) 0.4 MG SL tablet, DISSOLVE ONE TABLET UNDER THE TONGUE EVERY 5 MINUTES AS NEEDED Strength: 0.4 mg, Disp: 100 tablet, Rfl: 3    pantoprazole (PROTONIX) 40 MG tablet, 40 mg., Disp: , Rfl:     potassium chloride (KLOR-CON) 10 MEQ TbSR, Take 1 tablet (10 mEq total) by mouth once daily., Disp: 90 tablet, Rfl: 3    primidone (MYSOLINE) 50 MG Tab, Take 1 tablet (50 mg total) by mouth 3 (three) times daily. (Patient not taking: Reported on 10/16/2023), Disp: 270 tablet, Rfl: 3    promethazine (PHENERGAN) 25 MG tablet, Take 1 tablet (25 mg total) by mouth every 4 (four) hours., Disp: 45 tablet, Rfl: 0    sertraline (ZOLOFT) 25 MG tablet, Take 25 mg by mouth., Disp: , Rfl:     SOOLANTRA 1 % Crea, by Each Nostril route once daily., Disp: , Rfl:     tamsulosin (FLOMAX) 0.4 mg Cap, Take 1 capsule (0.4 mg total) by mouth once daily., Disp: 90 capsule, Rfl: 3    traZODone (DESYREL) 100 MG tablet, 50 mg., Disp: , Rfl:     triamterene-hydrochlorothiazide 37.5-25 mg (DYAZIDE) 37.5-25 mg per capsule, Take 1 capsule by mouth every morning., Disp: , Rfl:     ALLERGIES:   Review of patient's allergies indicates:   Allergen Reactions    Codeine     Lortab [hydrocodone-acetaminophen] Hallucinations    Lovastatin      Other reaction(s): Muscle pain        PHYSICAL EXAMINATION:  BP (!) 141/87   Pulse 81   Ht 5' 11" (1.803 m)   Wt 111.8 kg (246 lb 6.4 oz)   SpO2 95%   BMI 34.37 kg/m²           Left Hip Exam     Inspection   Swelling: absent  Bruising: present    Tenderness   The patient tender to palpation of the trochanteric bursa.    Range of Motion   Extension:  normal   Flexion:  normal   External rotation:  normal   Internal rotation: normal     Tests   Pain w/ forced internal rotation (ANIVAL): absent  Pain w/ forced external rotation (FADIR): " absent  Log Roll: negative    Other   Sensation: normal          Vascular Exam       Left Pulses  Dorsalis Pedis:      2+          No orders of the defined types were placed in this encounter.    Procedures

## 2025-02-12 ENCOUNTER — TELEPHONE (OUTPATIENT)
Dept: ORTHOPEDICS | Facility: CLINIC | Age: 80
End: 2025-02-12
Payer: MEDICARE

## 2025-02-12 PROBLEM — G56.01 CARPAL TUNNEL SYNDROME ON RIGHT: Chronic | Status: ACTIVE | Noted: 2024-11-13

## 2025-02-13 ENCOUNTER — ANESTHESIA (OUTPATIENT)
Dept: SURGERY | Facility: HOSPITAL | Age: 80
End: 2025-02-13
Payer: MEDICARE

## 2025-02-13 ENCOUNTER — HOSPITAL ENCOUNTER (OUTPATIENT)
Facility: HOSPITAL | Age: 80
Discharge: HOME | End: 2025-02-13
Attending: ORTHOPAEDIC SURGERY | Admitting: ORTHOPAEDIC SURGERY
Payer: MEDICARE

## 2025-02-13 ENCOUNTER — ANESTHESIA EVENT (OUTPATIENT)
Dept: SURGERY | Facility: HOSPITAL | Age: 80
End: 2025-02-13
Payer: MEDICARE

## 2025-02-13 VITALS
OXYGEN SATURATION: 97 % | TEMPERATURE: 98 F | SYSTOLIC BLOOD PRESSURE: 148 MMHG | RESPIRATION RATE: 16 BRPM | HEIGHT: 71 IN | WEIGHT: 242 LBS | BODY MASS INDEX: 33.88 KG/M2 | DIASTOLIC BLOOD PRESSURE: 68 MMHG | HEART RATE: 60 BPM

## 2025-02-13 DIAGNOSIS — G56.01 CARPAL TUNNEL SYNDROME ON RIGHT: Primary | ICD-10-CM

## 2025-02-13 LAB
GLUCOSE SERPL-MCNC: 188 MG/DL (ref 70–105)
GLUCOSE SERPL-MCNC: 191 MG/DL (ref 70–105)

## 2025-02-13 PROCEDURE — 63600175 PHARM REV CODE 636 W HCPCS: Performed by: ORTHOPAEDIC SURGERY

## 2025-02-13 PROCEDURE — 25000003 PHARM REV CODE 250: Performed by: ORTHOPAEDIC SURGERY

## 2025-02-13 PROCEDURE — 71000015 HC POSTOP RECOV 1ST HR: Performed by: ORTHOPAEDIC SURGERY

## 2025-02-13 PROCEDURE — 64721 CARPAL TUNNEL SURGERY: CPT | Mod: RT,,, | Performed by: ORTHOPAEDIC SURGERY

## 2025-02-13 PROCEDURE — 37000009 HC ANESTHESIA EA ADD 15 MINS: Performed by: ORTHOPAEDIC SURGERY

## 2025-02-13 PROCEDURE — 36000707: Performed by: ORTHOPAEDIC SURGERY

## 2025-02-13 PROCEDURE — 63600175 PHARM REV CODE 636 W HCPCS: Performed by: ANESTHESIOLOGY

## 2025-02-13 PROCEDURE — 71000033 HC RECOVERY, INTIAL HOUR: Performed by: ORTHOPAEDIC SURGERY

## 2025-02-13 PROCEDURE — 27000284 HC CANNULA NASAL: Performed by: ANESTHESIOLOGY

## 2025-02-13 PROCEDURE — 36000706: Performed by: ORTHOPAEDIC SURGERY

## 2025-02-13 PROCEDURE — 27000716 HC OXISENSOR PROBE, ANY SIZE: Performed by: ANESTHESIOLOGY

## 2025-02-13 PROCEDURE — 37000008 HC ANESTHESIA 1ST 15 MINUTES: Performed by: ORTHOPAEDIC SURGERY

## 2025-02-13 PROCEDURE — 63600175 PHARM REV CODE 636 W HCPCS: Performed by: NURSE ANESTHETIST, CERTIFIED REGISTERED

## 2025-02-13 RX ORDER — ACETAMINOPHEN 500 MG
1000 TABLET ORAL EVERY 6 HOURS PRN
Status: DISCONTINUED | OUTPATIENT
Start: 2025-02-13 | End: 2025-02-13 | Stop reason: HOSPADM

## 2025-02-13 RX ORDER — FENTANYL CITRATE 50 UG/ML
INJECTION, SOLUTION INTRAMUSCULAR; INTRAVENOUS
Status: DISCONTINUED | OUTPATIENT
Start: 2025-02-13 | End: 2025-02-13

## 2025-02-13 RX ORDER — GLUCAGON 1 MG
1 KIT INJECTION
Status: DISCONTINUED | OUTPATIENT
Start: 2025-02-13 | End: 2025-02-13 | Stop reason: HOSPADM

## 2025-02-13 RX ORDER — IPRATROPIUM BROMIDE AND ALBUTEROL SULFATE 2.5; .5 MG/3ML; MG/3ML
3 SOLUTION RESPIRATORY (INHALATION) ONCE AS NEEDED
Status: DISCONTINUED | OUTPATIENT
Start: 2025-02-13 | End: 2025-02-13 | Stop reason: HOSPADM

## 2025-02-13 RX ORDER — SODIUM CHLORIDE 9 MG/ML
INJECTION, SOLUTION INTRAVENOUS CONTINUOUS
Status: DISCONTINUED | OUTPATIENT
Start: 2025-02-13 | End: 2025-02-13 | Stop reason: HOSPADM

## 2025-02-13 RX ORDER — HYDROCODONE BITARTRATE AND ACETAMINOPHEN 5; 325 MG/1; MG/1
1 TABLET ORAL EVERY 4 HOURS PRN
Status: CANCELLED | OUTPATIENT
Start: 2025-02-13

## 2025-02-13 RX ORDER — BUPIVACAINE HYDROCHLORIDE 2.5 MG/ML
INJECTION, SOLUTION EPIDURAL; INFILTRATION; INTRACAUDAL
Status: DISCONTINUED | OUTPATIENT
Start: 2025-02-13 | End: 2025-02-13 | Stop reason: HOSPADM

## 2025-02-13 RX ORDER — HYDROMORPHONE HYDROCHLORIDE 2 MG/ML
0.5 INJECTION, SOLUTION INTRAMUSCULAR; INTRAVENOUS; SUBCUTANEOUS EVERY 5 MIN PRN
Status: DISCONTINUED | OUTPATIENT
Start: 2025-02-13 | End: 2025-02-13 | Stop reason: HOSPADM

## 2025-02-13 RX ORDER — ONDANSETRON HYDROCHLORIDE 2 MG/ML
INJECTION, SOLUTION INTRAVENOUS
Status: DISCONTINUED | OUTPATIENT
Start: 2025-02-13 | End: 2025-02-13

## 2025-02-13 RX ORDER — PROPOFOL 10 MG/ML
INJECTION, EMULSION INTRAVENOUS
Status: DISCONTINUED | OUTPATIENT
Start: 2025-02-13 | End: 2025-02-13

## 2025-02-13 RX ORDER — KETOROLAC TROMETHAMINE 30 MG/ML
INJECTION, SOLUTION INTRAMUSCULAR; INTRAVENOUS
Status: DISCONTINUED | OUTPATIENT
Start: 2025-02-13 | End: 2025-02-13

## 2025-02-13 RX ORDER — HYDROCODONE BITARTRATE AND ACETAMINOPHEN 5; 325 MG/1; MG/1
1 TABLET ORAL EVERY 6 HOURS PRN
Qty: 28 TABLET | Refills: 0 | Status: SHIPPED | OUTPATIENT
Start: 2025-02-13 | End: 2025-02-20

## 2025-02-13 RX ORDER — DIPHENHYDRAMINE HYDROCHLORIDE 50 MG/ML
25 INJECTION INTRAMUSCULAR; INTRAVENOUS EVERY 6 HOURS PRN
Status: DISCONTINUED | OUTPATIENT
Start: 2025-02-13 | End: 2025-02-13 | Stop reason: HOSPADM

## 2025-02-13 RX ORDER — LIDOCAINE HYDROCHLORIDE 5 MG/ML
INJECTION, SOLUTION INFILTRATION; INTRAVENOUS
Status: DISCONTINUED | OUTPATIENT
Start: 2025-02-13 | End: 2025-02-13

## 2025-02-13 RX ORDER — PROMETHAZINE HYDROCHLORIDE 25 MG/1
25 TABLET ORAL EVERY 6 HOURS PRN
Status: DISCONTINUED | OUTPATIENT
Start: 2025-02-13 | End: 2025-02-13 | Stop reason: HOSPADM

## 2025-02-13 RX ORDER — DEXAMETHASONE SODIUM PHOSPHATE 4 MG/ML
INJECTION, SOLUTION INTRA-ARTICULAR; INTRALESIONAL; INTRAMUSCULAR; INTRAVENOUS; SOFT TISSUE
Status: DISCONTINUED | OUTPATIENT
Start: 2025-02-13 | End: 2025-02-13

## 2025-02-13 RX ORDER — ONDANSETRON HYDROCHLORIDE 2 MG/ML
4 INJECTION, SOLUTION INTRAVENOUS DAILY PRN
Status: DISCONTINUED | OUTPATIENT
Start: 2025-02-13 | End: 2025-02-13 | Stop reason: HOSPADM

## 2025-02-13 RX ORDER — HYDROCODONE BITARTRATE AND ACETAMINOPHEN 10; 325 MG/1; MG/1
1 TABLET ORAL EVERY 4 HOURS PRN
Status: CANCELLED | OUTPATIENT
Start: 2025-02-13

## 2025-02-13 RX ORDER — ONDANSETRON 4 MG/1
8 TABLET, ORALLY DISINTEGRATING ORAL EVERY 8 HOURS PRN
Status: DISCONTINUED | OUTPATIENT
Start: 2025-02-13 | End: 2025-02-13 | Stop reason: HOSPADM

## 2025-02-13 RX ADMIN — PROPOFOL 50 MG: 10 INJECTION, EMULSION INTRAVENOUS at 09:02

## 2025-02-13 RX ADMIN — SODIUM CHLORIDE 3 G: 9 INJECTION, SOLUTION INTRAVENOUS at 08:02

## 2025-02-13 RX ADMIN — KETOROLAC TROMETHAMINE 30 MG: 30 INJECTION, SOLUTION INTRAMUSCULAR at 08:02

## 2025-02-13 RX ADMIN — SODIUM CHLORIDE: 9 INJECTION, SOLUTION INTRAVENOUS at 07:02

## 2025-02-13 RX ADMIN — DEXAMETHASONE SODIUM PHOSPHATE 4 MG: 4 INJECTION, SOLUTION INTRA-ARTICULAR; INTRALESIONAL; INTRAMUSCULAR; INTRAVENOUS; SOFT TISSUE at 08:02

## 2025-02-13 RX ADMIN — PROPOFOL 100 MG: 10 INJECTION, EMULSION INTRAVENOUS at 09:02

## 2025-02-13 RX ADMIN — FENTANYL CITRATE 100 MCG: 50 INJECTION, SOLUTION INTRAMUSCULAR; INTRAVENOUS at 08:02

## 2025-02-13 RX ADMIN — PROPOFOL 30 MG: 10 INJECTION, EMULSION INTRAVENOUS at 08:02

## 2025-02-13 RX ADMIN — LIDOCAINE HYDROCHLORIDE 50 ML: 5 INJECTION, SOLUTION INFILTRATION; INTRAVENOUS at 08:02

## 2025-02-13 RX ADMIN — ONDANSETRON 4 MG: 2 INJECTION INTRAMUSCULAR; INTRAVENOUS at 08:02

## 2025-02-13 RX ADMIN — PROPOFOL 20 MG: 10 INJECTION, EMULSION INTRAVENOUS at 08:02

## 2025-02-13 NOTE — H&P
[facility] Orthopedic Periop Services  Orthopedics  H&P    Patient Name: Usama Lee  MRN: [id number]  Admission Date: (Not on file)  Primary Care Provider: Lily, Primary Doctor    Patient information was obtained from patient and ER records.     Subjective:     Principal Problem:Carpal tunnel syndrome on right    Chief Complaint: No chief complaint on file.       HPI: Chief complaint:  Right wrist pain and finger numbness  History:Usama Lee is a 79 y.o. male seen for evaluation of left knee and right hand.  He was known to me having undergone right TKR 10-12 years ago and left carpal tunnel surgical release in the past.  He had evidence of right carpal tunnel syndrome but he was not followed up.  Over the past 2-3 months however he was developed persistent decreased sensation in the median nerve distribution of the right hand.   Impression:  Severe right carpal tunnel syndrome anesthesia in the median nerve distribution of the right hand phone: Right carpal tunnel surgical release outpatient anesthesia of choice.  Potential benefits and risks of surgery outlined to include but not limited to bleeding, infection, damage to blood vessels and nerves, need further surgery permanent other risks and complications including even death the patient wished to proceed.    Past Medical History:   Diagnosis Date    Anticoagulant long-term use     Arthritis     Cancer     skin    Diabetes     Encounter for blood transfusion     Gout     Heart disease     High cholesterol     HTN (hypertension)     Parkinson disease     Sleep apnea        Past Surgical History:   Procedure Laterality Date    APPENDECTOMY      CARDIAC SURGERY      CARPAL TUNNEL RELEASE Left 8/1/2023    Procedure: RELEASE, CARPAL TUNNEL;  Surgeon: Daniel Dempsey MD;  Location: [facility];  Service: Orthopedics;  Laterality: Left;    CERVICAL LAMINECTOMY WITH SPINAL FUSION N/A 04/28/2022    Procedure: C3-C7 Laminectomy and Fusion;   Surgeon: Charbel Blake MD;  Location: [facility] OR;  Service: Neurosurgery;  Laterality: N/A;  Neuro monitoring    FLEXOR TENDON REPAIR Left 08/19/2021    Procedure: REPAIR, TENDON, FLEXOR;  Surgeon: Daniel Dempsey MD;  Location: [facility] OR;  Service: Orthopedics;  Laterality: Left;    KNEE SURGERY      NECK SURGERY      TOTAL KNEE ARTHROPLASTY Right     TRIGGER FINGER RELEASE Left 08/19/2021    Procedure: RELEASE, TRIGGER FINGER,RING;  Surgeon: Daniel Dempsey MD;  Location: [facility] OR;  Service: Orthopedics;  Laterality: Left;       Review of patient's allergies indicates:   Allergen Reactions    Codeine     Lortab [hydrocodone-acetaminophen] Hallucinations    Lovastatin      Other reaction(s): Muscle pain       No current facility-administered medications for this encounter.     Current Outpatient Medications   Medication Sig    acetaminophen (TYLENOL) 650 MG TbSR Take 650 mg by mouth Daily. prn    allopurinoL (ZYLOPRIM) 300 MG tablet TAKE 1 TABLET(S) BY MOUTH DAILY    ALPRAZolam (XANAX) 0.5 MG tablet Take 0.5 mg by mouth.    apixaban (ELIQUIS) 5 mg Tab Take 5 mg by mouth 2 (two) times daily.    aspirin (ECOTRIN) 81 MG EC tablet Take 1 tablet by mouth once daily.    carvediloL (COREG) 25 MG tablet Take 25 mg by mouth.    cyanocobalamin 500 MCG tablet 2 tablets    cyclobenzaprine (FLEXERIL) 5 MG tablet Take 5 mg by mouth 3 (three) times daily as needed for Muscle spasms.    doxycycline (MONODOX) 100 MG capsule TAKE ONE CAPSULE BY MOUTH DAILY WITH FOOD    ferrous sulfate (FEOSOL) 325 mg (65 mg iron) Tab tablet Take by mouth.    fluorouraciL (EFUDEX) 5 % cream APPLY TWICE DAILY TO LEFT cheeck FOR SIX WEEKS, WASH off EVERY MORNING    fluticasone propionate (FLONASE) 50 mcg/actuation nasal spray INSTILL 1 SPRAY IN NOSTRIL(S) DAILY    furosemide (LASIX) 40 MG tablet Take 1 tablet (40 mg total) by mouth 2 (two) times daily.    gabapentin (NEURONTIN) 400 MG capsule Take 400 mg by mouth 4  (four) times daily.    glipiZIDE (GLUCOTROL) 5 MG tablet 5 mg 2 (two) times daily with meals.    isosorbide mononitrate (IMDUR) 30 MG 24 hr tablet Take 1 tablet by mouth once daily.    meloxicam (MOBIC) 15 MG tablet Take 15 mg by mouth.    memantine (NAMENDA) 10 MG Tab Take 1 tablet (10 mg total) by mouth once daily. (Patient taking differently: Take 10 mg by mouth 2 (two) times daily.)    nicotine polacrilex 2 MG Lozg DISSOLVE 1 LOZENGE IN MOUTH EVERY 2 HOURS AS NEEDED FOR SMOKING CESSATION    nitroGLYCERIN (NITROSTAT) 0.4 MG SL tablet DISSOLVE ONE TABLET UNDER THE TONGUE EVERY 5 MINUTES AS NEEDED  Strength: 0.4 mg    pantoprazole (PROTONIX) 40 MG tablet 40 mg.    potassium chloride (KLOR-CON) 10 MEQ TbSR Take 1 tablet (10 mEq total) by mouth once daily.    primidone (MYSOLINE) 50 MG Tab Take 1 tablet (50 mg total) by mouth 3 (three) times daily. (Patient not taking: Reported on 10/16/2023)    promethazine (PHENERGAN) 25 MG tablet Take 1 tablet (25 mg total) by mouth every 4 (four) hours.    sertraline (ZOLOFT) 25 MG tablet Take 25 mg by mouth.    SOOLANTRA 1 % Crea by Each Nostril route once daily.    tamsulosin (FLOMAX) 0.4 mg Cap Take 1 capsule (0.4 mg total) by mouth once daily.    traZODone (DESYREL) 100 MG tablet 50 mg.    triamterene-hydrochlorothiazide 37.5-25 mg (DYAZIDE) 37.5-25 mg per capsule Take 1 capsule by mouth every morning.     Family History    None       Tobacco Use    Smoking status: Never    Smokeless tobacco: Former   Substance and Sexual Activity    Alcohol use: Not Currently    Drug use: Never    Sexual activity: Not Currently     Review of Systems   Constitutional: Negative.     Objective:     Vital Signs (Most Recent):    Vital Signs (24h Range):  BP: ()/()   Arterial Line BP: ()/()            There is no height or weight on file to calculate BMI.    No intake or output data in the 24 hours ending 02/12/25 2044     General    Vitals reviewed.  Constitutional: He is oriented to person,  place, and time. He appears well-developed and well-nourished.   HENT:   Head: Normocephalic and atraumatic.   Eyes: EOM are normal. Pupils are equal, round, and reactive to light.   Neck: Neck supple.   Cardiovascular:  Normal rate, regular rhythm and normal heart sounds.            Pulmonary/Chest: Effort normal and breath sounds normal.   Abdominal: Soft. Bowel sounds are normal.   Neurological: He is alert and oriented to person, place, and time.   Psychiatric: He has a normal mood and affect. His behavior is normal.             Right Hand/Wrist Exam     Tests   Phalens sign: positive  Tinel's sign (median nerve): positive  Carpal Tunnel Compression Test: positive      Other     Neuorologic Exam    Median Distribution: abnormal  Ulnar Distribution: normal  Radial Distribution: normal      Left Hand/Wrist Exam   Left hand exam is normal.    Inspection   Scars:  Hand -  present          Vascular Exam       Capillary Refill  Right Hand: normal capillary refill           Significant Labs: All pertinent labs within the past 24 hours have been reviewed.    Significant Imaging: I have reviewed all pertinent imaging results/findings.  Assessment/Plan:     No notes have been filed under this hospital service.  Service: Orthopedic Surgery      Daniel Dempsey MD  Orthopedics  [facility] - Orthopedic Periop Services

## 2025-02-13 NOTE — INTERVAL H&P NOTE
The patient has been examined and the H&P has been reviewed:    I concur with the findings and no changes have occurred since H&P was written.    Surgery risks, benefits and alternative options discussed and understood by patient/family.          Active Hospital Problems    Diagnosis  POA    *Carpal tunnel syndrome on right [G56.01]  Yes     Chronic      Resolved Hospital Problems   No resolved problems to display.

## 2025-02-13 NOTE — OP NOTE
[facility] - Orthopedic Periop Services  Surgery Department  Operative Note    SUMMARY     Date of Procedure: 2/13/2025     Procedure: Procedure(s) (LRB):  RELEASE, CARPAL TUNNEL (Right)     Surgeons and Role:     * Daniel Dempsey MD - Primary    Assisting Surgeon: None    Pre-Operative Diagnosis: Carpal tunnel syndrome of right wrist [G56.01]    Post-Operative Diagnosis: Post-Op Diagnosis Codes:     * Carpal tunnel syndrome of right wrist [G56.01]    Anesthesia: [place] block    Technical Procedures Used:  Right carpal tunnel release             DEPARTMENT OF ORTHOPEDIC SURGERY                OPERATIVE REPORT     NAME:  Usama Lee  MRN: [id number]               DATE OF SURGERY:  02/13/2025       PREOPERATIVE DIAGNOSIS:  right carpal tunnel syndrome    POSTOPERATIVE DIAGNOSIS:  right carpal tunnel syndrome    ANESTHESIA:  [place] block    PROCEDURE:  rightcarpal tunnel surgical release    PROCEDURE IN DETAIL:  The patient was taken to the operating room and placed in the supine position.  After an adequate level of regional chichi block anesthesia had been achieved (SeeAnesthesia Note). The patients right upper extremity was scrubbed with Betadine and prepped in sterile fashion.  The operation was begun by making a linear skin incision from mid palm to the distal volar wrist crease.  The incision was carried carefully through the subcutaneous layers.  Now the transverse palmar fascia and volar carpal ligament were incised in line with the skin incision. A freer elevator was placed between the overlying fascia and underlying carpal canal contents.  Inspection revealed flexor tenosynovitis.  Inspection of the median nerve revealed constrictions beneath the volar carpal ligament.  A careful external neurolysis was performed using Loupe magnification.  A limited tenosynovectomy was performed.  Skin margins were infiltrated with ½% Marcaine without Epinephrine.   The tourniquet was let down and hemostasis was  achieved with the bipolar electrocautery.  The wound was irrigated copiously with antibiotic solution and the skin margins were reapproximated with running 5-0 Nylon suture.  The wound was dressed sterilely.  The patient was taken to the recovery room in satisfactory condition.  Estimated blood loss:  Minimal.  The patient received one gram of Ancef antibiotic intravenously prior to the procedure. Tourniquet time: 24min    Daniel Dempsey       Description of the Findings of the Procedure:  Right carpal tunnel syndrome    Significant Surgical Tasks Conducted by the Assistant(s), if Applicable:  None    Complications: No    Estimated Blood Loss (EBL): * No values recorded between 2/13/2025  9:07 AM and 2/13/2025  9:30 AM *           Implants: * No implants in log *    Specimens:   Specimen (24h ago, onward)      None                    Condition: Good    Disposition: PACU - hemodynamically stable.    Attestation: I was present and scrubbed for the entire procedure.

## 2025-02-13 NOTE — BRIEF OP NOTE
[facility] - Orthopedic Periop Services  Brief Operative Note    Surgery Date: 2/13/2025     Surgeons and Role:     * Daniel Dempsey MD - Primary    Assisting Surgeon: None    Pre-op Diagnosis:  Carpal tunnel syndrome of right wrist [G56.01]    Post-op Diagnosis:  Post-Op Diagnosis Codes:     * Carpal tunnel syndrome of right wrist [G56.01]    Procedure(s) (LRB):  RELEASE, CARPAL TUNNEL (Right)    Anesthesia: [place] block    Description of the findings of the procedure(s): See Op Note     Estimated Blood Loss: * No values recorded between 2/13/2025  9:07 AM and 2/13/2025  9:30 AM *3cc         Specimens:   Specimen (24h ago, onward)      None              Discharge Note    OUTCOME: Patient tolerated treatment/procedure well without complication and is now ready for discharge.    DISPOSITION: Home or Self Care    FINAL DIAGNOSIS:  Carpal tunnel syndrome on right    FOLLOWUP: In clinic    DISCHARGE INSTRUCTIONS:    Discharge Procedure Orders   Diet general     Keep surgical extremity elevated     Ice to affected area   Order Comments: using barrier between ice and skin (specify duration&frequency)     Call MD for:  temperature >100.4     Call MD for:  persistent nausea and vomiting     Call MD for:  severe uncontrolled pain     Call MD for:  difficulty breathing, headache or visual disturbances     Call MD for:  redness, tenderness, or signs of infection (pain, swelling, redness, odor or green/yellow discharge around incision site)     Call MD for:  hives     Call MD for:  persistent dizziness or light-headedness     Call MD for:  extreme fatigue     Leave dressing on - Keep it clean, dry, and intact until clinic visit     Activity as tolerated     Shower on day dressing removed (No bath)     Weight bearing as tolerated

## 2025-02-13 NOTE — ANESTHESIA PROCEDURE NOTES
Peripheral Block    Patient location during procedure: OR    Reason for block: primary anesthetic    Diagnosis: right CTS   Start time: 2/13/2025 8:40 AM  Timeout: 2/13/2025 8:40 AM   End time: 2/13/2025 8:45 AM    Staffing  Authorizing Provider: Nam Lawler DO  Performing Provider: Nam Lawler DO    Staffing  Performed by: Nam Lawler DO  Authorized by: Nam Lawler DO    Preanesthetic Checklist  Completed: patient identified, IV checked, site marked, risks and benefits discussed, surgical consent, monitors and equipment checked, pre-op evaluation and timeout performed  Peripheral Block  Patient position: supine  Prep: ChloraPrep  Patient monitoring: cardiac monitor, continuous pulse ox, continuous capnometry and frequent blood pressure checks  Block type: [place] block  Laterality: right  Injection technique: single shot  Needle  Needle type: Angiocath   Needle gauge: 22 G  Needle length: 1.5 in  Needle localization: anatomical landmarks     Assessment  Paresthesia pain: none  Heart rate change: no  Slow fractionated injection: yes  Pain Tolerance: comfortable throughout block      Additional Notes  Lidia Block RUE - 50cc 0.5% lidocaine given at 08:45

## 2025-02-13 NOTE — HPI
Chief complaint:  Right wrist pain and finger numbness  History:Usama Lee is a 79 y.o. male seen for evaluation of left knee and right hand.  He was known to me having undergone right TKR 10-12 years ago and left carpal tunnel surgical release in the past.  He had evidence of right carpal tunnel syndrome but he was not followed up.  Over the past 2-3 months however he was developed persistent decreased sensation in the median nerve distribution of the right hand.   Impression:  Severe right carpal tunnel syndrome anesthesia in the median nerve distribution of the right hand phone: Right carpal tunnel surgical release outpatient anesthesia of choice.  Potential benefits and risks of surgery outlined to include but not limited to bleeding, infection, damage to blood vessels and nerves, need further surgery permanent other risks and complications including even death the patient wished to proceed.

## 2025-02-13 NOTE — ANESTHESIA PREPROCEDURE EVALUATION
02/13/2025  Usama Lee is a 79 y.o., male.      Pre-op Assessment    I have reviewed the Patient Summary Reports.     I have reviewed the Nursing Notes. I have reviewed the NPO Status.   I have reviewed the Medications.     Review of Systems  Anesthesia Hx:  No problems with previous Anesthesia             Denies Family Hx of Anesthesia complications.    Denies Personal Hx of Anesthesia complications.                    Social:  Non-Smoker, No Alcohol Use       Hematology/Oncology:  Hematology Normal   Oncology Normal                                   EENT/Dental:  EENT/Dental Normal           Cardiovascular:  Exercise tolerance: good  Pacemaker Hypertension   CAD   CABG/stent Dysrhythmias atrial fibrillation Angina CHF    hyperlipidemia   ECG has been reviewed.                            Pulmonary:        Sleep Apnea, CPAP                Renal/:    BPH              Hepatic/GI:     GERD                Musculoskeletal:  Arthritis               Neurological:    Neuromuscular Disease,             Peripheral Neuropathy   Dementia mild                     Movement Disorder Dx, Parkinson's Disease   Endocrine:  Diabetes         Obesity / BMI > 30  Dermatological:  Skin Normal    Psych:   anxiety               Past Medical History:   Diagnosis Date    Anticoagulant long-term use     Arthritis     Cancer     skin    Diabetes     Encounter for blood transfusion     Gout     Heart disease     High cholesterol     HTN (hypertension)     Parkinson disease     Sleep apnea      Past Surgical History:   Procedure Laterality Date    APPENDECTOMY      CARDIAC SURGERY      CARPAL TUNNEL RELEASE Left 8/1/2023    Procedure: RELEASE, CARPAL TUNNEL;  Surgeon: Daniel Dempsey MD;  Location: [facility];  Service: Orthopedics;  Laterality: Left;    CERVICAL LAMINECTOMY WITH SPINAL FUSION N/A 04/28/2022     Procedure: C3-C7 Laminectomy and Fusion;  Surgeon: Charbel Blake MD;  Location: [facility] OR;  Service: Neurosurgery;  Laterality: N/A;  Neuro monitoring    FLEXOR TENDON REPAIR Left 08/19/2021    Procedure: REPAIR, TENDON, FLEXOR;  Surgeon: Daniel Dempsey MD;  Location: [facility] OR;  Service: Orthopedics;  Laterality: Left;    KNEE SURGERY      NECK SURGERY      TOTAL KNEE ARTHROPLASTY Right     TRIGGER FINGER RELEASE Left 08/19/2021    Procedure: RELEASE, TRIGGER FINGER,RING;  Surgeon: Daniel Dempsey MD;  Location: [facility] OR;  Service: Orthopedics;  Laterality: Left;         Physical Exam  General: Well nourished, Cooperative, Alert and Oriented    Airway:  Mallampati: II / II  Mouth Opening: Normal  TM Distance: Normal  Neck ROM: Normal ROM    Dental:  Intact    Chest/Lungs:  Clear to auscultation    Heart:  Rate: Normal  Rhythm: Regular Rhythm  Sounds: Normal        Chemistry        Component Value Date/Time     02/03/2025 0908    K 4.0 02/03/2025 0908    CL 98 02/03/2025 0908    CO2 31 02/03/2025 0908    BUN 19 02/03/2025 0908    CREATININE 0.88 02/03/2025 0908     (H) 02/03/2025 0908        Component Value Date/Time    CALCIUM 9.3 02/03/2025 0908    ALKPHOS 143 02/03/2025 0908    AST 28 02/03/2025 0908    ALT 19 02/03/2025 0908    BILITOT 0.7 02/03/2025 0908    EGFRNONAA 93 05/03/2022 0353        Lab Results   Component Value Date    WBC 4.96 02/03/2025    RBC 3.89 (L) 02/03/2025    HGB 12.0 (L) 02/03/2025    MCV 97.2 (H) 02/03/2025    MCH 30.8 02/03/2025    MCHC 31.7 (L) 02/03/2025    RDW 14.1 02/03/2025     02/03/2025    MPV 10.4 02/03/2025    LYMPH 23.6 (L) 02/03/2025    LYMPH 1.17 02/03/2025    MONO 11.5 (H) 02/03/2025    EOS 0.06 02/03/2025    BASO 0.03 02/03/2025     Results for orders placed or performed in visit on 04/13/22   EKG 12-lead    Collection Time: 04/13/22 10:04 AM    Narrative    Test Reason : Z01.818,    Vent. Rate : 081 BPM     Atrial Rate :  081 BPM     P-R Int : 342 ms          QRS Dur : 084 ms      QT Int : 372 ms       P-R-T Axes : 092 -36 -32 degrees     QTc Int : 432 ms    Atrial-paced rhythm with prolonged AV conduction  Left axis deviation  Low voltage QRS  Nonspecific ST and T wave abnormality  Abnormal ECG  When compared with ECG of 19-AUG-2021 11:27,  No significant change was found  Confirmed by Lisandra Elmore MD [id number]) on 4/14/2022 11:13:07 AM    Referred By: LUDIN LACKEY           Confirmed By:Lisandra Elmore MD         Anesthesia Plan  Type of Anesthesia, risks & benefits discussed:    Anesthesia Type: MAC, Regional  Intra-op Monitoring Plan: Standard ASA Monitors  Post Op Pain Control Plan: multimodal analgesia  Induction:  IV  Airway Plan: Direct  Informed Consent: Informed consent signed with the Patient and all parties understand the risks and agree with anesthesia plan.  All questions answered. Patient consented to blood products? Yes  ASA Score: 3  Day of Surgery Review of History & Physical: H&P Update referred to the surgeon/provider.I have interviewed and examined the patient. I have reviewed the patient's H&P dated: There are no significant changes.   Anesthesia Plan Notes: ASA 3, RANJITH block/MAC    Ready For Surgery From Anesthesia Perspective.     .

## 2025-02-13 NOTE — ANESTHESIA POSTPROCEDURE EVALUATION
Anesthesia Post Evaluation    Patient: Usama Lee    Procedure(s) Performed: Procedure(s) (LRB):  RELEASE, CARPAL TUNNEL (Right)    Final Anesthesia Type: MAC      Patient location during evaluation: PACU  Patient participation: Yes- Able to Participate  Level of consciousness: awake and alert and oriented  Post-procedure vital signs: reviewed and stable  Pain management: adequate  Airway patency: patent  TREVER mitigation strategies: Multimodal analgesia and Use of major conduction anesthesia (spinal/epidural) or peripheral nerve block  PONV status at discharge: No PONV  Anesthetic complications: no      Cardiovascular status: hemodynamically stable  Respiratory status: unassisted and spontaneous ventilation  Hydration status: euvolemic  Follow-up needed               Vitals Value Taken Time   /67 02/13/25 0933   Temp 97.6 02/13/25 0933   Pulse 60 02/13/25 0932   Resp 14 02/13/25 0933   SpO2 94 % 02/13/25 0932   Vitals shown include unfiled device data.      No case tracking events are documented in the log.      Pain/Magdy Score: No data recorded

## 2025-02-13 NOTE — PLAN OF CARE
Spoke with patient regarding allergies to lortab and codeine and will take extra tylenol if needed for pain after discharge.

## 2025-02-13 NOTE — TRANSFER OF CARE
"Anesthesia Transfer of Care Note    Patient: Usama Champagneglekaterina    Procedure(s) Performed: Procedure(s) (LRB):  RELEASE, CARPAL TUNNEL (Right)    Patient location: PACU    Anesthesia Type: MAC    Transport from OR: Transported from OR on room air with adequate spontaneous ventilation    Post pain: adequate analgesia    Post assessment: no apparent anesthetic complications    Post vital signs: stable    Level of consciousness: sedated and responds to stimulation    Nausea/Vomiting: no nausea/vomiting    Complications: none    Transfer of care protocol was followedComments: Good SV continue, NAD, VSS, RTRN      Last vitals: Visit Vitals  /67 (BP Location: Left arm, Patient Position: Lying)   Pulse 60   Temp 36.4 °C (97.6 °F) (Oral)   Resp 16   Ht 5' 11" (1.803 m)   Wt 109.8 kg (242 lb)   SpO2 95%   BMI 33.75 kg/m²     "

## 2025-02-13 NOTE — OP NOTE
[facility] - Orthopedic Periop Services  Surgery Department  Operative Note    SUMMARY     Date of Procedure: 2/13/2025     Procedure: Procedure(s) (LRB):  RELEASE, CARPAL TUNNEL (Right)     Surgeons and Role:     * Daniel Dempsey MD - Primary    Assisting Surgeon: None    Pre-Operative Diagnosis: Carpal tunnel syndrome of right wrist [G56.01]    Post-Operative Diagnosis: Post-Op Diagnosis Codes:     * Carpal tunnel syndrome of right wrist [G56.01]    Anesthesia:  Chichi block    Technical Procedures Used:  Right carpal tunnel surgical release             DEPARTMENT OF ORTHOPEDIC SURGERY                OPERATIVE REPORT     NAME:  Usama Lee  MRN: [id number]               DATE OF SURGERY:  02/13/2025       PREOPERATIVE DIAGNOSIS:  right carpal tunnel syndrome    POSTOPERATIVE DIAGNOSIS:  right carpal tunnel syndrome    ANESTHESIA:  Chichi block    PROCEDURE:  rightcarpal tunnel surgical release    PROCEDURE IN DETAIL:  The patient was taken to the operating room and placed in the supine position.  After an adequate level of regional chichi block anesthesia had been achieved (SeeAnesthesia Note). The patients right upper extremity was scrubbed with Betadine and prepped in sterile fashion.  The operation was begun by making a linear skin incision from mid palm to the distal volar wrist crease.  The incision was carried carefully through the subcutaneous layers.  Now the transverse palmar fascia and volar carpal ligament were incised in line with the skin incision. A freer elevator was placed between the overlying fascia and underlying carpal canal contents.  Inspection revealed flexor tenosynovitis.  Inspection of the median nerve revealed constrictions beneath the volar carpal ligament.  A careful external neurolysis was performed using Loupe magnification.  A limited tenosynovectomy was performed.  Skin margins were infiltrated with ½% Marcaine without Epinephrine.   The tourniquet was let down and  hemostasis was achieved with the bipolar electrocautery.  The wound was irrigated copiously with antibiotic solution and the skin margins were reapproximated with running 5-0 Nylon suture.  The wound was dressed sterilely.  The patient was taken to the recovery room in satisfactory condition.  Estimated blood loss:  Minimal.  The patient received one gram of Ancef antibiotic intravenously prior to the procedure. Tourniquet time:  24 minutes    Daniel Dempsye       Description of the Findings of the Procedure:  Right carpal tunnel syndrome/median nerve compression    Significant Surgical Tasks Conducted by the Assistant(s), if Applicable:  None    Complications: No    Estimated Blood Loss (EBL): * No values recorded between 2/13/2025  9:07 AM and 2/13/2025  9:30 AM *           Implants: * No implants in log *    Specimens:   Specimen (24h ago, onward)      None                    Condition: Good    Disposition: PACU - hemodynamically stable.    Attestation: I was present and scrubbed for the entire procedure.

## 2025-02-13 NOTE — SUBJECTIVE & OBJECTIVE
Past Medical History:   Diagnosis Date    Anticoagulant long-term use     Arthritis     Cancer     skin    Diabetes     Encounter for blood transfusion     Gout     Heart disease     High cholesterol     HTN (hypertension)     Parkinson disease     Sleep apnea        Past Surgical History:   Procedure Laterality Date    APPENDECTOMY      CARDIAC SURGERY      CARPAL TUNNEL RELEASE Left 8/1/2023    Procedure: RELEASE, CARPAL TUNNEL;  Surgeon: Daniel Dempsey MD;  Location: [facility] OR;  Service: Orthopedics;  Laterality: Left;    CERVICAL LAMINECTOMY WITH SPINAL FUSION N/A 04/28/2022    Procedure: C3-C7 Laminectomy and Fusion;  Surgeon: Charbel Blake MD;  Location: [facility] OR;  Service: Neurosurgery;  Laterality: N/A;  Neuro monitoring    FLEXOR TENDON REPAIR Left 08/19/2021    Procedure: REPAIR, TENDON, FLEXOR;  Surgeon: Daniel Dempsey MD;  Location: [facility] OR;  Service: Orthopedics;  Laterality: Left;    KNEE SURGERY      NECK SURGERY      TOTAL KNEE ARTHROPLASTY Right     TRIGGER FINGER RELEASE Left 08/19/2021    Procedure: RELEASE, TRIGGER FINGER,RING;  Surgeon: Daniel Dempsey MD;  Location: [facility];  Service: Orthopedics;  Laterality: Left;       Review of patient's allergies indicates:   Allergen Reactions    Codeine     Lortab [hydrocodone-acetaminophen] Hallucinations    Lovastatin      Other reaction(s): Muscle pain       No current facility-administered medications for this encounter.     Current Outpatient Medications   Medication Sig    acetaminophen (TYLENOL) 650 MG TbSR Take 650 mg by mouth Daily. prn    allopurinoL (ZYLOPRIM) 300 MG tablet TAKE 1 TABLET(S) BY MOUTH DAILY    ALPRAZolam (XANAX) 0.5 MG tablet Take 0.5 mg by mouth.    apixaban (ELIQUIS) 5 mg Tab Take 5 mg by mouth 2 (two) times daily.    aspirin (ECOTRIN) 81 MG EC tablet Take 1 tablet by mouth once daily.    carvediloL (COREG) 25 MG tablet Take 25 mg by mouth.    cyanocobalamin 500 MCG tablet 2  tablets    cyclobenzaprine (FLEXERIL) 5 MG tablet Take 5 mg by mouth 3 (three) times daily as needed for Muscle spasms.    doxycycline (MONODOX) 100 MG capsule TAKE ONE CAPSULE BY MOUTH DAILY WITH FOOD    ferrous sulfate (FEOSOL) 325 mg (65 mg iron) Tab tablet Take by mouth.    fluorouraciL (EFUDEX) 5 % cream APPLY TWICE DAILY TO LEFT cheeck FOR SIX WEEKS, WASH off EVERY MORNING    fluticasone propionate (FLONASE) 50 mcg/actuation nasal spray INSTILL 1 SPRAY IN NOSTRIL(S) DAILY    furosemide (LASIX) 40 MG tablet Take 1 tablet (40 mg total) by mouth 2 (two) times daily.    gabapentin (NEURONTIN) 400 MG capsule Take 400 mg by mouth 4 (four) times daily.    glipiZIDE (GLUCOTROL) 5 MG tablet 5 mg 2 (two) times daily with meals.    isosorbide mononitrate (IMDUR) 30 MG 24 hr tablet Take 1 tablet by mouth once daily.    meloxicam (MOBIC) 15 MG tablet Take 15 mg by mouth.    memantine (NAMENDA) 10 MG Tab Take 1 tablet (10 mg total) by mouth once daily. (Patient taking differently: Take 10 mg by mouth 2 (two) times daily.)    nicotine polacrilex 2 MG Lozg DISSOLVE 1 LOZENGE IN MOUTH EVERY 2 HOURS AS NEEDED FOR SMOKING CESSATION    nitroGLYCERIN (NITROSTAT) 0.4 MG SL tablet DISSOLVE ONE TABLET UNDER THE TONGUE EVERY 5 MINUTES AS NEEDED  Strength: 0.4 mg    pantoprazole (PROTONIX) 40 MG tablet 40 mg.    potassium chloride (KLOR-CON) 10 MEQ TbSR Take 1 tablet (10 mEq total) by mouth once daily.    primidone (MYSOLINE) 50 MG Tab Take 1 tablet (50 mg total) by mouth 3 (three) times daily. (Patient not taking: Reported on 10/16/2023)    promethazine (PHENERGAN) 25 MG tablet Take 1 tablet (25 mg total) by mouth every 4 (four) hours.    sertraline (ZOLOFT) 25 MG tablet Take 25 mg by mouth.    SOOLANTRA 1 % Crea by Each Nostril route once daily.    tamsulosin (FLOMAX) 0.4 mg Cap Take 1 capsule (0.4 mg total) by mouth once daily.    traZODone (DESYREL) 100 MG tablet 50 mg.    triamterene-hydrochlorothiazide 37.5-25 mg (DYAZIDE)  37.5-25 mg per capsule Take 1 capsule by mouth every morning.     Family History    None       Tobacco Use    Smoking status: Never    Smokeless tobacco: Former   Substance and Sexual Activity    Alcohol use: Not Currently    Drug use: Never    Sexual activity: Not Currently     Review of Systems   Constitutional: Negative.     Objective:     Vital Signs (Most Recent):    Vital Signs (24h Range):  BP: ()/()   Arterial Line BP: ()/()            There is no height or weight on file to calculate BMI.    No intake or output data in the 24 hours ending 02/12/25 2044     General    Vitals reviewed.  Constitutional: He is oriented to person, place, and time. He appears well-developed and well-nourished.   HENT:   Head: Normocephalic and atraumatic.   Eyes: EOM are normal. Pupils are equal, round, and reactive to light.   Neck: Neck supple.   Cardiovascular:  Normal rate, regular rhythm and normal heart sounds.            Pulmonary/Chest: Effort normal and breath sounds normal.   Abdominal: Soft. Bowel sounds are normal.   Neurological: He is alert and oriented to person, place, and time.   Psychiatric: He has a normal mood and affect. His behavior is normal.             Right Hand/Wrist Exam     Tests   Phalens sign: positive  Tinel's sign (median nerve): positive  Carpal Tunnel Compression Test: positive      Other     Neuorologic Exam    Median Distribution: abnormal  Ulnar Distribution: normal  Radial Distribution: normal      Left Hand/Wrist Exam   Left hand exam is normal.    Inspection   Scars:  Hand -  present          Vascular Exam       Capillary Refill  Right Hand: normal capillary refill           Significant Labs: All pertinent labs within the past 24 hours have been reviewed.    Significant Imaging: I have reviewed all pertinent imaging results/findings.

## 2025-02-26 ENCOUNTER — OFFICE VISIT (OUTPATIENT)
Dept: ORTHOPEDICS | Facility: CLINIC | Age: 80
End: 2025-02-26
Payer: MEDICARE

## 2025-02-26 DIAGNOSIS — Z98.890 STATUS POST CARPAL TUNNEL RELEASE: Primary | ICD-10-CM

## 2025-02-26 PROCEDURE — 99212 OFFICE O/P EST SF 10 MIN: CPT | Mod: PBBFAC

## 2025-02-26 PROCEDURE — 99999 PR PBB SHADOW E&M-EST. PATIENT-LVL II: CPT | Mod: PBBFAC,,,

## 2025-02-26 NOTE — PROGRESS NOTES
Release, Carpal Tunnel - Right 2/13/2025    Usama Lee is a 79 y.o. male seen today for post-op visit.  Patient is 13 days status post right carpal tunnel release by Dr. Dempsey.  Patient is concerned about taking the stitches out too early.  He reports that the sutures were removed too early when he has surgery on the left wrist. He reports improvement in numbness and tingling in the right hand following surgery. No other complaints today.      PAST MEDICAL HISTORY:   Past Medical History:   Diagnosis Date    Anticoagulant long-term use     Arthritis     Cancer     skin    Diabetes     Encounter for blood transfusion     Gout     Heart disease     High cholesterol     HTN (hypertension)     Parkinson disease     Sleep apnea         PAST SURGICAL HISTORY:   Past Surgical History:   Procedure Laterality Date    APPENDECTOMY      CARDIAC SURGERY      CARPAL TUNNEL RELEASE Left 8/1/2023    Procedure: RELEASE, CARPAL TUNNEL;  Surgeon: Daniel Dempsey MD;  Location: [facility];  Service: Orthopedics;  Laterality: Left;    CARPAL TUNNEL RELEASE Right 2/13/2025    Procedure: RELEASE, CARPAL TUNNEL;  Surgeon: Daniel Dempsey MD;  Location: [facility];  Service: Orthopedics;  Laterality: Right;    CERVICAL LAMINECTOMY WITH SPINAL FUSION N/A 04/28/2022    Procedure: C3-C7 Laminectomy and Fusion;  Surgeon: Charbel Blake MD;  Location: [facility] OR;  Service: Neurosurgery;  Laterality: N/A;  Neuro monitoring    FLEXOR TENDON REPAIR Left 08/19/2021    Procedure: REPAIR, TENDON, FLEXOR;  Surgeon: Daniel Dempsey MD;  Location: [facility];  Service: Orthopedics;  Laterality: Left;    KNEE SURGERY      NECK SURGERY      TOTAL KNEE ARTHROPLASTY Right     TRIGGER FINGER RELEASE Left 08/19/2021    Procedure: RELEASE, TRIGGER FINGER,RING;  Surgeon: Daniel Dempsey MD;  Location: [facility];  Service: Orthopedics;  Laterality: Left;        MEDICATIONS:  Current Medications[1]       PHYSICAL EXAM:      GENERAL: Well-developed, well-nourished male . The patient is alert, oriented and cooperative.    EXTREMITIES:  Right wrist skin clean dry and intact, minimal swelling and erythema around incision site, neurovascularly intact    WOUND: Incisions are clean,dry,intact without signs of infection and healing well      RADIOGRAPHIC FINDINGS:   None today     Patient Active Problem List    Diagnosis Date Noted    Carpal tunnel syndrome on right 11/13/2024    Peripheral vascular disease due to secondary diabetes 09/18/2024    History of fall 08/31/2023    S/P carpal tunnel release 08/11/2023    Carpal tunnel syndrome of left wrist 04/03/2023    Anxiety state 11/09/2022    Benign prostatic hyperplasia 11/09/2022    Chronic atrial fibrillation 11/09/2022    Carbuncle and furuncle of upper arm and forearm 11/09/2022    Chronic diastolic heart failure 11/09/2022    Atherosclerosis of native coronary artery with unstable angina pectoris 11/09/2022    Diabetic neuropathy 11/09/2022    Gastroesophageal reflux disease 11/09/2022    Heart failure 11/09/2022    Iron deficiency anemia 11/09/2022    Low back pain 11/09/2022    Osteoarthritis 11/09/2022    Peripheral neuropathy 11/09/2022    Polyneuropathy due to type 2 diabetes mellitus 11/09/2022    Potassium deficiency 11/09/2022    Obstructive sleep apnea syndrome 11/09/2022    Fall 11/09/2022    Dysphagia 11/09/2022    On long term drug therapy 11/09/2022    Visual hallucinations 05/03/2022    Cervical myelopathy 04/28/2022    HTN (hypertension) 04/28/2022    DM (diabetes mellitus) 04/28/2022    HLD (hyperlipidemia) 04/28/2022    History of total knee arthroplasty, right 09/23/2021    Trigger finger of left hand 07/26/2021     IMPRESSION AND PLAN: Patient is status-post Release, Carpal Tunnel - Right doing well.  Patient would like to come back next week to have sutures removed as he feels in his too early.  Discussed that we  usually do take them out at 14 days postop however given that his incision opened up last time we will see him back on Tuesday for suture removal.    No follow-ups on file.       Jeimy Hernandez PA-C      (Subject to voice recognition error, transcription service not allowed)         [1]   Current Outpatient Medications:     acetaminophen (TYLENOL) 650 MG TbSR, Take 650 mg by mouth Daily. prn, Disp: , Rfl:     allopurinoL (ZYLOPRIM) 300 MG tablet, TAKE 1 TABLET(S) BY MOUTH DAILY, Disp: , Rfl:     ALPRAZolam (XANAX) 0.5 MG tablet, Take 0.5 mg by mouth., Disp: , Rfl:     apixaban (ELIQUIS) 5 mg Tab, Take 5 mg by mouth 2 (two) times daily., Disp: , Rfl:     aspirin (ECOTRIN) 81 MG EC tablet, Take 1 tablet by mouth once daily., Disp: , Rfl:     carvediloL (COREG) 25 MG tablet, Take 25 mg by mouth., Disp: , Rfl:     cyanocobalamin 500 MCG tablet, 2 tablets, Disp: , Rfl:     cyclobenzaprine (FLEXERIL) 5 MG tablet, Take 5 mg by mouth 3 (three) times daily as needed for Muscle spasms., Disp: , Rfl:     doxycycline (MONODOX) 100 MG capsule, TAKE ONE CAPSULE BY MOUTH DAILY WITH FOOD, Disp: , Rfl:     ferrous sulfate (FEOSOL) 325 mg (65 mg iron) Tab tablet, Take by mouth., Disp: , Rfl:     fluorouraciL (EFUDEX) 5 % cream, APPLY TWICE DAILY TO LEFT cheeck FOR SIX WEEKS, WASH off EVERY MORNING, Disp: , Rfl:     fluticasone propionate (FLONASE) 50 mcg/actuation nasal spray, INSTILL 1 SPRAY IN NOSTRIL(S) DAILY, Disp: , Rfl:     furosemide (LASIX) 40 MG tablet, Take 1 tablet (40 mg total) by mouth 2 (two) times daily., Disp: 60 tablet, Rfl: 1    gabapentin (NEURONTIN) 400 MG capsule, Take 400 mg by mouth 4 (four) times daily., Disp: , Rfl:     glipiZIDE (GLUCOTROL) 5 MG tablet, 5 mg 2 (two) times daily with meals., Disp: , Rfl:     isosorbide mononitrate (IMDUR) 30 MG 24 hr tablet, Take 1 tablet by mouth once daily., Disp: , Rfl:     meloxicam (MOBIC) 15 MG tablet, Take 15 mg by mouth., Disp: , Rfl:     memantine (NAMENDA) 10 MG  Tab, Take 1 tablet (10 mg total) by mouth once daily. (Patient taking differently: Take 10 mg by mouth 2 (two) times daily.), Disp: 90 tablet, Rfl: 3    nicotine polacrilex 2 MG Lozg, DISSOLVE 1 LOZENGE IN MOUTH EVERY 2 HOURS AS NEEDED FOR SMOKING CESSATION, Disp: , Rfl:     nitroGLYCERIN (NITROSTAT) 0.4 MG SL tablet, DISSOLVE ONE TABLET UNDER THE TONGUE EVERY 5 MINUTES AS NEEDED Strength: 0.4 mg, Disp: 100 tablet, Rfl: 3    pantoprazole (PROTONIX) 40 MG tablet, 40 mg., Disp: , Rfl:     potassium chloride (KLOR-CON) 10 MEQ TbSR, Take 1 tablet (10 mEq total) by mouth once daily., Disp: 90 tablet, Rfl: 3    primidone (MYSOLINE) 50 MG Tab, Take 1 tablet (50 mg total) by mouth 3 (three) times daily. (Patient not taking: Reported on 10/16/2023), Disp: 270 tablet, Rfl: 3    promethazine (PHENERGAN) 25 MG tablet, Take 1 tablet (25 mg total) by mouth every 4 (four) hours., Disp: 45 tablet, Rfl: 0    sertraline (ZOLOFT) 25 MG tablet, Take 25 mg by mouth., Disp: , Rfl:     SOOLANTRA 1 % Crea, by Each Nostril route once daily., Disp: , Rfl:     tamsulosin (FLOMAX) 0.4 mg Cap, Take 1 capsule (0.4 mg total) by mouth once daily., Disp: 90 capsule, Rfl: 3    traZODone (DESYREL) 100 MG tablet, 50 mg., Disp: , Rfl:     triamterene-hydrochlorothiazide 37.5-25 mg (DYAZIDE) 37.5-25 mg per capsule, Take 1 capsule by mouth every morning., Disp: , Rfl:

## 2025-03-04 ENCOUNTER — OFFICE VISIT (OUTPATIENT)
Dept: ORTHOPEDICS | Facility: CLINIC | Age: 80
End: 2025-03-04
Payer: MEDICARE

## 2025-03-04 VITALS — WEIGHT: 242.06 LBS | HEIGHT: 71 IN | BODY MASS INDEX: 33.89 KG/M2

## 2025-03-04 DIAGNOSIS — Z09 POSTOP CHECK: Primary | ICD-10-CM

## 2025-03-04 PROCEDURE — 99999 PR PBB SHADOW E&M-EST. PATIENT-LVL III: CPT | Mod: PBBFAC,,,

## 2025-03-04 PROCEDURE — 99024 POSTOP FOLLOW-UP VISIT: CPT | Mod: ,,,

## 2025-03-04 PROCEDURE — 99213 OFFICE O/P EST LOW 20 MIN: CPT | Mod: PBBFAC

## 2025-03-04 NOTE — PROGRESS NOTES
Release, Carpal Tunnel - Right 2/13/2025    Usama Lee is a 79 y.o. male seen today for post-op visit.  Patient is now 2 weeks 5 days status post carpal tunnel release by Dr. Dempsey.  Here for suture removal.      PAST MEDICAL HISTORY:   Past Medical History:   Diagnosis Date    Anticoagulant long-term use     Arthritis     Cancer     skin    Diabetes     Encounter for blood transfusion     Gout     Heart disease     High cholesterol     HTN (hypertension)     Parkinson disease     Sleep apnea         PAST SURGICAL HISTORY:   Past Surgical History:   Procedure Laterality Date    APPENDECTOMY      CARDIAC SURGERY      CARPAL TUNNEL RELEASE Left 8/1/2023    Procedure: RELEASE, CARPAL TUNNEL;  Surgeon: Daniel Dempsey MD;  Location: [facility] OR;  Service: Orthopedics;  Laterality: Left;    CARPAL TUNNEL RELEASE Right 2/13/2025    Procedure: RELEASE, CARPAL TUNNEL;  Surgeon: Daniel Dempsey MD;  Location: [facility] OR;  Service: Orthopedics;  Laterality: Right;    CERVICAL LAMINECTOMY WITH SPINAL FUSION N/A 04/28/2022    Procedure: C3-C7 Laminectomy and Fusion;  Surgeon: Charbel Blake MD;  Location: [facility] OR;  Service: Neurosurgery;  Laterality: N/A;  Neuro monitoring    FLEXOR TENDON REPAIR Left 08/19/2021    Procedure: REPAIR, TENDON, FLEXOR;  Surgeon: Daniel Dempsey MD;  Location: [facility] OR;  Service: Orthopedics;  Laterality: Left;    KNEE SURGERY      NECK SURGERY      TOTAL KNEE ARTHROPLASTY Right     TRIGGER FINGER RELEASE Left 08/19/2021    Procedure: RELEASE, TRIGGER FINGER,RING;  Surgeon: Daniel Dempsey MD;  Location: [facility];  Service: Orthopedics;  Laterality: Left;        MEDICATIONS: Current Medications[1]       PHYSICAL EXAM:      GENERAL: Well-developed, well-nourished male . The patient is alert, oriented and cooperative.    EXTREMITIES:  Skin clean dry and intact, good range of motion, neurovascularly  intact    WOUND: Incisions are clean,dry,intact without signs of infection and healing well      RADIOGRAPHIC FINDINGS:   None today     Patient Active Problem List    Diagnosis Date Noted    Carpal tunnel syndrome on right 11/13/2024    Peripheral vascular disease due to secondary diabetes 09/18/2024    History of fall 08/31/2023    S/P carpal tunnel release 08/11/2023    Carpal tunnel syndrome of left wrist 04/03/2023    Anxiety state 11/09/2022    Benign prostatic hyperplasia 11/09/2022    Chronic atrial fibrillation 11/09/2022    Carbuncle and furuncle of upper arm and forearm 11/09/2022    Chronic diastolic heart failure 11/09/2022    Atherosclerosis of native coronary artery with unstable angina pectoris 11/09/2022    Diabetic neuropathy 11/09/2022    Gastroesophageal reflux disease 11/09/2022    Heart failure 11/09/2022    Iron deficiency anemia 11/09/2022    Low back pain 11/09/2022    Osteoarthritis 11/09/2022    Peripheral neuropathy 11/09/2022    Polyneuropathy due to type 2 diabetes mellitus 11/09/2022    Potassium deficiency 11/09/2022    Obstructive sleep apnea syndrome 11/09/2022    Fall 11/09/2022    Dysphagia 11/09/2022    On long term drug therapy 11/09/2022    Visual hallucinations 05/03/2022    Cervical myelopathy 04/28/2022    HTN (hypertension) 04/28/2022    DM (diabetes mellitus) 04/28/2022    HLD (hyperlipidemia) 04/28/2022    History of total knee arthroplasty, right 09/23/2021    Trigger finger of left hand 07/26/2021     IMPRESSION AND PLAN: Patient is status-post Release, Carpal Tunnel - Right doing well. All incisional sutures/staples removed today and replaced with Steri-Strips, discussed that these can get wet in the shower in 2-3 days, let them fall off on their own.  Follow up Dr. Dempsey 4-6 weeks.    No follow-ups on file.       Jeimy Hernandez PA-C      (Subject to voice recognition error, transcription service not allowed)         [1]   Current Outpatient Medications:      acetaminophen (TYLENOL) 650 MG TbSR, Take 650 mg by mouth Daily. prn, Disp: , Rfl:     allopurinoL (ZYLOPRIM) 300 MG tablet, TAKE 1 TABLET(S) BY MOUTH DAILY, Disp: , Rfl:     ALPRAZolam (XANAX) 0.5 MG tablet, Take 0.5 mg by mouth., Disp: , Rfl:     apixaban (ELIQUIS) 5 mg Tab, Take 5 mg by mouth 2 (two) times daily., Disp: , Rfl:     aspirin (ECOTRIN) 81 MG EC tablet, Take 1 tablet by mouth once daily., Disp: , Rfl:     carvediloL (COREG) 25 MG tablet, Take 25 mg by mouth., Disp: , Rfl:     cyanocobalamin 500 MCG tablet, 2 tablets, Disp: , Rfl:     cyclobenzaprine (FLEXERIL) 5 MG tablet, Take 5 mg by mouth 3 (three) times daily as needed for Muscle spasms., Disp: , Rfl:     doxycycline (MONODOX) 100 MG capsule, TAKE ONE CAPSULE BY MOUTH DAILY WITH FOOD, Disp: , Rfl:     ferrous sulfate (FEOSOL) 325 mg (65 mg iron) Tab tablet, Take by mouth., Disp: , Rfl:     fluorouraciL (EFUDEX) 5 % cream, APPLY TWICE DAILY TO LEFT cheeck FOR SIX WEEKS, WASH off EVERY MORNING, Disp: , Rfl:     fluticasone propionate (FLONASE) 50 mcg/actuation nasal spray, INSTILL 1 SPRAY IN NOSTRIL(S) DAILY, Disp: , Rfl:     furosemide (LASIX) 40 MG tablet, Take 1 tablet (40 mg total) by mouth 2 (two) times daily., Disp: 60 tablet, Rfl: 1    gabapentin (NEURONTIN) 400 MG capsule, Take 400 mg by mouth 4 (four) times daily., Disp: , Rfl:     glipiZIDE (GLUCOTROL) 5 MG tablet, 5 mg 2 (two) times daily with meals., Disp: , Rfl:     isosorbide mononitrate (IMDUR) 30 MG 24 hr tablet, Take 1 tablet by mouth once daily., Disp: , Rfl:     meloxicam (MOBIC) 15 MG tablet, Take 15 mg by mouth., Disp: , Rfl:     memantine (NAMENDA) 10 MG Tab, Take 1 tablet (10 mg total) by mouth once daily. (Patient taking differently: Take 10 mg by mouth 2 (two) times daily.), Disp: 90 tablet, Rfl: 3    nicotine polacrilex 2 MG Lozg, DISSOLVE 1 LOZENGE IN MOUTH EVERY 2 HOURS AS NEEDED FOR SMOKING CESSATION, Disp: , Rfl:     nitroGLYCERIN (NITROSTAT) 0.4 MG SL tablet, DISSOLVE  ONE TABLET UNDER THE TONGUE EVERY 5 MINUTES AS NEEDED Strength: 0.4 mg, Disp: 100 tablet, Rfl: 3    pantoprazole (PROTONIX) 40 MG tablet, 40 mg., Disp: , Rfl:     potassium chloride (KLOR-CON) 10 MEQ TbSR, Take 1 tablet (10 mEq total) by mouth once daily., Disp: 90 tablet, Rfl: 3    primidone (MYSOLINE) 50 MG Tab, Take 1 tablet (50 mg total) by mouth 3 (three) times daily. (Patient not taking: Reported on 10/16/2023), Disp: 270 tablet, Rfl: 3    promethazine (PHENERGAN) 25 MG tablet, Take 1 tablet (25 mg total) by mouth every 4 (four) hours., Disp: 45 tablet, Rfl: 0    sertraline (ZOLOFT) 25 MG tablet, Take 25 mg by mouth., Disp: , Rfl:     SOOLANTRA 1 % Crea, by Each Nostril route once daily., Disp: , Rfl:     tamsulosin (FLOMAX) 0.4 mg Cap, Take 1 capsule (0.4 mg total) by mouth once daily., Disp: 90 capsule, Rfl: 3    traZODone (DESYREL) 100 MG tablet, 50 mg., Disp: , Rfl:     triamterene-hydrochlorothiazide 37.5-25 mg (DYAZIDE) 37.5-25 mg per capsule, Take 1 capsule by mouth every morning., Disp: , Rfl:

## 2025-04-01 ENCOUNTER — OFFICE VISIT (OUTPATIENT)
Dept: FAMILY MEDICINE | Facility: CLINIC | Age: 80
End: 2025-04-01
Payer: MEDICARE

## 2025-04-01 ENCOUNTER — RESULTS FOLLOW-UP (OUTPATIENT)
Dept: FAMILY MEDICINE | Facility: CLINIC | Age: 80
End: 2025-04-01

## 2025-04-01 ENCOUNTER — APPOINTMENT (OUTPATIENT)
Dept: RADIOLOGY | Facility: CLINIC | Age: 80
End: 2025-04-01
Attending: NURSE PRACTITIONER
Payer: MEDICARE

## 2025-04-01 VITALS
HEART RATE: 77 BPM | BODY MASS INDEX: 35.42 KG/M2 | OXYGEN SATURATION: 95 % | HEIGHT: 71 IN | SYSTOLIC BLOOD PRESSURE: 125 MMHG | RESPIRATION RATE: 17 BRPM | DIASTOLIC BLOOD PRESSURE: 77 MMHG | TEMPERATURE: 96 F | WEIGHT: 253 LBS

## 2025-04-01 DIAGNOSIS — M25.521 ELBOW PAIN, RIGHT: Primary | ICD-10-CM

## 2025-04-01 DIAGNOSIS — L02.413 ABSCESS OF RIGHT ELBOW: ICD-10-CM

## 2025-04-01 PROCEDURE — 96372 THER/PROPH/DIAG INJ SC/IM: CPT | Mod: ,,, | Performed by: NURSE PRACTITIONER

## 2025-04-01 PROCEDURE — 73070 X-RAY EXAM OF ELBOW: CPT | Mod: 26,RT,, | Performed by: RADIOLOGY

## 2025-04-01 PROCEDURE — 99212 OFFICE O/P EST SF 10 MIN: CPT | Mod: ,,, | Performed by: NURSE PRACTITIONER

## 2025-04-01 PROCEDURE — 73070 X-RAY EXAM OF ELBOW: CPT | Mod: TC,RHCUB,RT | Performed by: NURSE PRACTITIONER

## 2025-04-01 RX ORDER — DEXAMETHASONE SODIUM PHOSPHATE 4 MG/ML
4 INJECTION, SOLUTION INTRA-ARTICULAR; INTRALESIONAL; INTRAMUSCULAR; INTRAVENOUS; SOFT TISSUE
Status: COMPLETED | OUTPATIENT
Start: 2025-04-01 | End: 2025-04-01

## 2025-04-01 RX ADMIN — DEXAMETHASONE SODIUM PHOSPHATE 4 MG: 4 INJECTION, SOLUTION INTRA-ARTICULAR; INTRALESIONAL; INTRAMUSCULAR; INTRAVENOUS; SOFT TISSUE at 10:04

## 2025-04-01 NOTE — PROGRESS NOTES
[facility] Medicine    Chief Complaint      Chief Complaint   Patient presents with    Joint Swelling     Knot on right elbow, no known injury, rated 8 on pain scale, showed up day before yesterday, trevin merida with [facility] recommended to be seen; hurts when he moves his fingers, tender to the touch, solid and does not move; had carpel tunnel sx little over a month ago on that hand       History of Present Illness      Usama Lee is a 79 y.o. male. He  has a past medical history of Anticoagulant long-term use, Arthritis, Cancer, Diabetes, Encounter for blood transfusion, Gout, Heart disease, High cholesterol, HTN (hypertension), Parkinson disease, and Sleep apnea., who presents today for c/o a possible knot on his R elbow- no known injury. He does admit he put together a shelf the other day and had to do some lifting and pulling.     Past Medical History:  Past Medical History:   Diagnosis Date    Anticoagulant long-term use     Arthritis     Cancer     skin    Diabetes     Encounter for blood transfusion     Gout     Heart disease     High cholesterol     HTN (hypertension)     Parkinson disease     Sleep apnea        Past Surgical History:   has a past surgical history that includes Total knee arthroplasty (Right); Trigger finger release (Left, 08/19/2021); Flexor tendon repair (Left, 08/19/2021); Cervical laminectomy with spinal fusion (N/A, 04/28/2022); Cardiac surgery; Appendectomy; Knee surgery; Neck surgery; Carpal tunnel release (Left, 8/1/2023); and Carpal tunnel release (Right, 2/13/2025).    Social History:  Social History[1]    I personally reviewed all past medical, surgical, and social.     Review of Systems   Constitutional:  Negative for chills and fever.   Respiratory:  Negative for shortness of breath.    Cardiovascular: Negative.    Musculoskeletal:  Positive for arthralgias and joint swelling.   Skin: Negative.    Neurological:  Positive for numbness. Negative for weakness.     "    Medications:  Encounter Medications[2]    Allergies:  Review of patient's allergies indicates:   Allergen Reactions    Codeine     Lortab [hydrocodone-acetaminophen] Hallucinations    Lovastatin      Other reaction(s): Muscle pain       Health Maintenance:  Immunization History   Administered Date(s) Administered    COVID-19, MRNA, LN-S, PF (MODERNA FULL 0.5 ML DOSE) 01/14/2021, 02/12/2021    Influenza 12/17/2004, 03/22/2005, 06/29/2005, 10/06/2005, 04/18/2006, 12/26/2006, 03/30/2007, 07/05/2007, 10/23/2007, 01/23/2008, 04/23/2008, 07/24/2008, 11/01/2008, 12/03/2008, 10/28/2009, 11/01/2009, 12/22/2009, 12/22/2009, 12/28/2010, 10/27/2011, 02/24/2014, 11/01/2014, 10/01/2015, 11/23/2016, 10/27/2020, 12/01/2021, 11/01/2022    Influenza - Trivalent - Fluzone High Dose - PF (65 years and older) 10/16/2017, 10/25/2018, 11/07/2019, 10/20/2020, 10/29/2021    Pneumococcal Conjugate - 13 Valent 02/22/2016    Pneumococcal Polysaccharide - 23 Valent 10/06/2005, 07/18/2019    Td (Adult), Unspecified Formulation 01/01/2007    Zoster Recombinant 08/11/2020      Health Maintenance   Topic Date Due    TETANUS VACCINE  01/01/2017    RSV Vaccine (Age 60+ and Pregnant patients) (1 - 1-dose 75+ series) Never done    COVID-19 Vaccine (4 - 2024-25 season) 09/01/2024    Diabetic Eye Exam  11/07/2024    Hemoglobin A1c  08/03/2025    Diabetes Urine Screening  09/18/2025    Lipid Panel  02/03/2026    Shingles Vaccine  Completed    Influenza Vaccine  Completed    Pneumococcal Vaccines (Age 50+)  Completed        Physical Exam      Vital Signs  Temp: 96.2 °F (35.7 °C)  Temp Source: Oral  Pulse: 77  Resp: 17  SpO2: 95 %  BP: 125/77  Pain Score:   8  Pain Loc: Elbow  Height and Weight  Height: 5' 11" (180.3 cm)  Weight: 114.8 kg (253 lb)  BSA (Calculated - sq m): 2.4 sq meters  BMI (Calculated): 35.3  Weight in (lb) to have BMI = 25: 178.9]    Physical Exam  Vitals and nursing note reviewed.   Constitutional:       Appearance: Normal " appearance.   HENT:      Head: Normocephalic.      Right Ear: Hearing normal.      Left Ear: Hearing normal.      Nose: Nose normal.   Eyes:      General: Lids are normal.      Conjunctiva/sclera: Conjunctivae normal.   Neck:      Thyroid: No thyromegaly.   Cardiovascular:      Rate and Rhythm: Normal rate.   Pulmonary:      Effort: Pulmonary effort is normal. No respiratory distress.   Musculoskeletal:         General: Swelling and tenderness present. No deformity or signs of injury. Normal range of motion.      Right elbow: Swelling present. Tenderness present in lateral epicondyle.      Cervical back: Normal range of motion and neck supple.      Right lower leg: No edema.      Left lower leg: No edema.      Comments: Mild swelling and tenderness noted on lateral side of R elbow   Skin:     General: Skin is warm and dry.      Findings: No erythema.   Neurological:      General: No focal deficit present.      Mental Status: He is alert and oriented to person, place, and time.      Gait: Gait is intact.          Laboratory:  CBC:  Recent Labs   Lab 11/09/22  1554 04/10/24  0807 02/03/25  0908   WBC 6.26 5.38 4.96   RBC 4.23 L 4.20 L 3.89 L   Hemoglobin 12.7 L 13.1 L 12.0 L   Hematocrit 39.2 L 38.9 L 37.8 L   Platelet Count 165 133 L 168   MCV 92.7 92.6 97.2 H   MCH 30.0 31.2 H 30.8   MCHC 32.4 33.7 31.7 L     CMP:  Recent Labs   Lab 05/03/22  0353 11/09/22  1554 02/03/25  0908   Glucose 184 H 163 H 181 H   Calcium 8.5 8.8 9.3   Albumin 2.7 L 3.3 L 3.7   Total Protein 6.0 L 7.2 6.7   Sodium 135 L 137 138   Potassium 3.5 3.7 4.0   CO2 33 H 33 H 31   Chloride 94 L 100 98   BUN 16 16 19   Alk Phos 93 160 H 143   ALT 9 L 20 19   AST 22 18 28   Bilirubin, Total 1.1 0.4 0.7     LIPIDS:  Recent Labs   Lab 04/29/22  0314 11/09/22  1554 02/03/25  0908   TSH 0.423 1.250  --    HDL Cholesterol  --   --  59   Cholesterol  --   --  169   Triglycerides  --   --  73   LDL Calculated  --   --  95   Cholesterol/HDL Ratio (Risk  Factor)  --   --  2.9   Non-HDL  --   --  110     TSH:  Recent Labs   Lab 04/29/22  0314 11/09/22  1554   TSH 0.423 1.250     A1C:  Recent Labs   Lab 04/13/22  0935 11/09/22  1554 06/16/23  1230 02/03/25  0908   Hemoglobin A1C 6.0 6.4 6.2 6.8       Assessment/Plan     Usama Lee is a 79 y.o.male with:     1. Elbow pain, right  -     X-Ray Elbow 2 Views Right; Future; Expected date: 04/01/2025  -     dexAMETHasone injection 4 mg       Will call with results of xray      Total time spent face-to-face and non-face-to-face coordinating care for this encounter was: 15 minutes     Chronic conditions status updated as per HPI.  Other than changes above, cont current medications and maintain follow up with specialists.  Return to clinic prn if symptoms worsen or fail to improve.    Brittney Victor, P  [facility]         [1]   Social History  Tobacco Use    Smoking status: Never     Passive exposure: Never    Smokeless tobacco: Former   Substance Use Topics    Alcohol use: Not Currently    Drug use: Never   [2]   Outpatient Encounter Medications as of 4/1/2025   Medication Sig Dispense Refill    acetaminophen (TYLENOL) 650 MG TbSR Take 650 mg by mouth Daily. prn      allopurinoL (ZYLOPRIM) 300 MG tablet TAKE 1 TABLET(S) BY MOUTH DAILY      ALPRAZolam (XANAX) 0.5 MG tablet Take 0.5 mg by mouth.      apixaban (ELIQUIS) 5 mg Tab Take 5 mg by mouth 2 (two) times daily.      aspirin (ECOTRIN) 81 MG EC tablet Take 1 tablet by mouth once daily.      carvediloL (COREG) 25 MG tablet Take 25 mg by mouth.      cyanocobalamin 500 MCG tablet 2 tablets      cyclobenzaprine (FLEXERIL) 5 MG tablet Take 5 mg by mouth 3 (three) times daily as needed for Muscle spasms.      doxycycline (MONODOX) 100 MG capsule TAKE ONE CAPSULE BY MOUTH DAILY WITH FOOD      ferrous sulfate (FEOSOL) 325 mg (65 mg iron) Tab tablet Take by mouth.      fluorouraciL (EFUDEX) 5 % cream APPLY TWICE DAILY TO LEFT cheeck FOR SIX WEEKS, WASH off EVERY  MORNING      fluticasone propionate (FLONASE) 50 mcg/actuation nasal spray INSTILL 1 SPRAY IN NOSTRIL(S) DAILY      furosemide (LASIX) 40 MG tablet Take 1 tablet (40 mg total) by mouth 2 (two) times daily. 60 tablet 1    gabapentin (NEURONTIN) 400 MG capsule Take 400 mg by mouth 4 (four) times daily.      glipiZIDE (GLUCOTROL) 5 MG tablet 5 mg 2 (two) times daily with meals.      isosorbide mononitrate (IMDUR) 30 MG 24 hr tablet Take 1 tablet by mouth once daily.      meloxicam (MOBIC) 15 MG tablet Take 15 mg by mouth.      memantine (NAMENDA) 10 MG Tab Take 1 tablet (10 mg total) by mouth once daily. (Patient taking differently: Take 10 mg by mouth 2 (two) times daily.) 90 tablet 3    nicotine polacrilex 2 MG Lozg DISSOLVE 1 LOZENGE IN MOUTH EVERY 2 HOURS AS NEEDED FOR SMOKING CESSATION      nitroGLYCERIN (NITROSTAT) 0.4 MG SL tablet DISSOLVE ONE TABLET UNDER THE TONGUE EVERY 5 MINUTES AS NEEDED  Strength: 0.4 mg 100 tablet 3    pantoprazole (PROTONIX) 40 MG tablet 40 mg.      potassium chloride (KLOR-CON) 10 MEQ TbSR Take 1 tablet (10 mEq total) by mouth once daily. 90 tablet 3    promethazine (PHENERGAN) 25 MG tablet Take 1 tablet (25 mg total) by mouth every 4 (four) hours. 45 tablet 0    sertraline (ZOLOFT) 25 MG tablet Take 25 mg by mouth.      SOOLANTRA 1 % Crea by Each Nostril route once daily.      tamsulosin (FLOMAX) 0.4 mg Cap Take 1 capsule (0.4 mg total) by mouth once daily. 90 capsule 3    traZODone (DESYREL) 100 MG tablet 50 mg.      triamterene-hydrochlorothiazide 37.5-25 mg (DYAZIDE) 37.5-25 mg per capsule Take 1 capsule by mouth every morning.      primidone (MYSOLINE) 50 MG Tab Take 1 tablet (50 mg total) by mouth 3 (three) times daily. (Patient not taking: Reported on 10/16/2023) 270 tablet 3     Facility-Administered Encounter Medications as of 4/1/2025   Medication Dose Route Frequency Provider Last Rate Last Admin    dexAMETHasone injection 4 mg  4 mg Intramuscular 1 time in Clinic/HOD Kayleigh  RODNEY Felix

## 2025-04-02 ENCOUNTER — TELEPHONE (OUTPATIENT)
Dept: FAMILY MEDICINE | Facility: CLINIC | Age: 80
End: 2025-04-02
Payer: MEDICARE

## 2025-04-02 NOTE — TELEPHONE ENCOUNTER
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

## 2025-04-03 ENCOUNTER — TELEPHONE (OUTPATIENT)
Dept: FAMILY MEDICINE | Facility: CLINIC | Age: 80
End: 2025-04-03
Payer: MEDICARE

## 2025-05-15 DIAGNOSIS — I10 ESSENTIAL HYPERTENSION: ICD-10-CM

## 2025-05-15 RX ORDER — FUROSEMIDE 40 MG/1
40 TABLET ORAL 2 TIMES DAILY
Qty: 60 TABLET | Refills: 1 | Status: SHIPPED | OUTPATIENT
Start: 2025-05-15

## 2025-06-12 DIAGNOSIS — I89.0 LYMPHEDEMA: Primary | ICD-10-CM

## 2025-07-10 PROCEDURE — 88305 TISSUE EXAM BY PATHOLOGIST: CPT | Mod: TC,SUR | Performed by: DERMATOLOGY

## 2025-07-10 PROCEDURE — 88305 TISSUE EXAM BY PATHOLOGIST: CPT | Mod: 26,,, | Performed by: PATHOLOGY

## 2025-07-11 ENCOUNTER — LAB REQUISITION (OUTPATIENT)
Dept: LAB | Facility: HOSPITAL | Age: 80
End: 2025-07-11
Attending: DERMATOLOGY
Payer: MEDICARE

## 2025-07-11 DIAGNOSIS — D49.2 NEOPLASM OF UNSPECIFIED BEHAVIOR OF BONE, SOFT TISSUE, AND SKIN: ICD-10-CM

## 2025-08-11 ENCOUNTER — CLINICAL SUPPORT (OUTPATIENT)
Dept: REHABILITATION | Facility: HOSPITAL | Age: 80
End: 2025-08-11
Payer: MEDICARE

## 2025-08-11 DIAGNOSIS — I89.0 LYMPHEDEMA: Primary | ICD-10-CM

## 2025-08-11 PROCEDURE — 97162 PT EVAL MOD COMPLEX 30 MIN: CPT

## 2025-08-11 PROCEDURE — 97530 THERAPEUTIC ACTIVITIES: CPT

## 2025-08-26 ENCOUNTER — CLINICAL SUPPORT (OUTPATIENT)
Dept: REHABILITATION | Facility: HOSPITAL | Age: 80
End: 2025-08-26
Payer: MEDICARE

## 2025-08-26 DIAGNOSIS — I89.0 LYMPHEDEMA: Primary | ICD-10-CM

## 2025-08-26 PROCEDURE — 97140 MANUAL THERAPY 1/> REGIONS: CPT

## (undated) DEVICE — FLEXIBLE YANKAUER SUCTION CATH

## (undated) DEVICE — GLOVE SURGICAL PROTEXIS PI CLASSIC SIZE 6.5

## (undated) DEVICE — NEEDLE SPINAL 20G X 3 1/2IN

## (undated) DEVICE — IMP DISPOSABLE PINS
Type: IMPLANTABLE DEVICE | Site: NECK | Status: NON-FUNCTIONAL
Removed: 2022-04-28

## (undated) DEVICE — BANDAGE MATRIX HK LOOP 2IN 5YD

## (undated) DEVICE — SUTURE PDS II 5-0 P-3 CLEAR 18IN

## (undated) DEVICE — SUTURE STRATAFIX VIOLET CT-1 45CM

## (undated) DEVICE — GOWN SURGICAL SMARTGOWN LEVEL 4 / EXTRA LARGE STERILE

## (undated) DEVICE — GLOVE PROTEXIS PI SYN SURG 6.5

## (undated) DEVICE — Device

## (undated) DEVICE — FILM IOBAN ANTIMICROBL 60X60CM

## (undated) DEVICE — SUT ETHILON 5-0 P3 18IN BLK

## (undated) DEVICE — GLOVE SURGICAL PROTEXIS PI SIZE 6

## (undated) DEVICE — GLOVE SENSICARE PI SURG 7.5

## (undated) DEVICE — BUR ELITE PRECISION NEURO 3.0

## (undated) DEVICE — SYRINGE ASEPTO IRRIGATION BULB 60CC LF DISP

## (undated) DEVICE — SYRINGE 10-12CC LURE -LOK TIP

## (undated) DEVICE — SYR 10CC LUER LOCK

## (undated) DEVICE — FLOSEAL MATRIX HEMOSTATIC 10ML

## (undated) DEVICE — SUTURE STRATAFIX PGA 2-0 26CM

## (undated) DEVICE — SOCKINETTE DOUBLE PLY 4X48IN

## (undated) DEVICE — GOWN POLY REINF BRTH SLV XL

## (undated) DEVICE — SPONGE XRAY DETECT 4X4IN PK/10

## (undated) DEVICE — WOUND DRAINAGE KIT MEDIUM 10

## (undated) DEVICE — SOL NACL IRR 1000ML BTL

## (undated) DEVICE — NDL HYPODERMIC SAFETY 25G 1IN

## (undated) DEVICE — GLOVE 8 PROTEXIS PI BLUE

## (undated) DEVICE — APPLICATOR CHLORAPREP ORN 26ML

## (undated) DEVICE — COUNTER SHARPS FOAM BLOCK MAGNET 20-40

## (undated) DEVICE — PADDING WYTEX UNDRCST 2INX4YD

## (undated) DEVICE — DRAPE SURGICAL 3/4 SHEET 56 X 77" STERILE"

## (undated) DEVICE — PADDING CAST 2IN STERILE

## (undated) DEVICE — POSITIONER HEADREST FOAM 11 X 10 X 6

## (undated) DEVICE — GLOVE BIOGEL SKINSENSE PI 7.5

## (undated) DEVICE — VALLEYLAB BIPOLAR FORCEPS CORD 12FT

## (undated) DEVICE — APPLICATOR CHLORAPREP HI-LITE TINTED ORANGE 26ML

## (undated) DEVICE — TUBING SUCTION 5MM STERILE 3/16IN X 12FT

## (undated) DEVICE — NEEDLE ECLIPSE 25GX1IN SAFETY

## (undated) DEVICE — GLOVE 7.5 PROTEXIS PI BLUE

## (undated) DEVICE — BAG-A-JET FLUID DISPENSER SYSTEM

## (undated) DEVICE — DRAPE FLUORO C ARM 36X30IN

## (undated) DEVICE — TOWEL OR STERILE BLUE 4/PK 20PK/CS

## (undated) DEVICE — GLOVE SURGICAL PROTEXIS PI SIZE 8.5

## (undated) DEVICE — FOAM POSITIONER ARM SURGICAL

## (undated) DEVICE — DRAPE STERI SURGICAL TOWEL 1000

## (undated) DEVICE — COVER OVERHEAD TABLE TIBURON 80 X 90""

## (undated) DEVICE — SOL IRRIGATION SALINE 0.9% 1000ML BOTTLE

## (undated) DEVICE — SPONGE GAUZE 4X4 12 PLY STL AMD 10/TRAY

## (undated) DEVICE — SUTURE ETHILON 5-0 18 BLK P-3

## (undated) DEVICE — SLING ARM VOGUE LARGE W/LOGO

## (undated) DEVICE — BLADE SURG SS SZ 10

## (undated) DEVICE — GOWN SURGICAL STERILE LEVEL 3 / XX-LARGE

## (undated) DEVICE — GLOVE SURGICAL PROTEXIS PI BLUE SIZE 7.5

## (undated) DEVICE — GLOVE SURGICAL PROTEXIS PI BLUE SIZE 6.0

## (undated) DEVICE — GLOVE SURGICAL PROTEXIS PI CLASSIC SIZE 7.5

## (undated) DEVICE — SUTURE BONE WAX (W31G) 2.5 GM

## (undated) DEVICE — SUTURE STRATAFIX CP-2 24CM X 24CX

## (undated) DEVICE — GLOVE SURGICAL PROTEXIS PI BLUE SIZE 8.0

## (undated) DEVICE — CLEANER TIP BOVIE ELECTROSURG

## (undated) DEVICE — BIPOLAR AQUAMANTYS SEALER 6.0

## (undated) DEVICE — BLADE CARBON SURG SS SZ 15 STERILE

## (undated) DEVICE — STOCKINETTE COTTON 4FTX48IN 2-PLY OFF WHITE STERILE

## (undated) DEVICE — GLOVE 7.0 PROTEXIS PI BLUE

## (undated) DEVICE — SUTURE VICRYL 1 CT-1 UD BR 27IN

## (undated) DEVICE — NEEDLE SPINAL 18G X 3 1/2IN

## (undated) DEVICE — FORCEP BAYONET BIPOLAR 10.5 DISP

## (undated) DEVICE — U-BAR CHESTPACK III

## (undated) DEVICE — DRAPE COVER C-ARM C-ARMOR 42IN X 74IN DISP STERILE

## (undated) DEVICE — GLOVE SENSICARE PI GRN 8

## (undated) DEVICE — SLING ARM LARGE FOAM STRAP

## (undated) DEVICE — BUR MATCH HEAD SOFT 2.0

## (undated) DEVICE — CDS EXTREMITY